# Patient Record
Sex: MALE | Race: BLACK OR AFRICAN AMERICAN | NOT HISPANIC OR LATINO | Employment: UNEMPLOYED | URBAN - METROPOLITAN AREA
[De-identification: names, ages, dates, MRNs, and addresses within clinical notes are randomized per-mention and may not be internally consistent; named-entity substitution may affect disease eponyms.]

---

## 2023-02-10 PROBLEM — E78.5 DYSLIPIDEMIA: Status: ACTIVE | Noted: 2023-02-10

## 2023-02-10 PROBLEM — R35.0 URINARY FREQUENCY: Status: ACTIVE | Noted: 2023-02-10

## 2023-02-10 PROBLEM — G47.34 NOCTURNAL HYPOXIA: Status: ACTIVE | Noted: 2023-02-10

## 2023-02-10 PROBLEM — M25.562 LEFT KNEE PAIN: Status: ACTIVE | Noted: 2023-02-10

## 2023-02-10 PROBLEM — F11.20 METHADONE DEPENDENCE (MULTI): Status: ACTIVE | Noted: 2023-02-10

## 2023-02-10 PROBLEM — J44.9 CHRONIC OBSTRUCTIVE PULMONARY DISEASE (MULTI): Status: ACTIVE | Noted: 2023-02-10

## 2023-02-10 PROBLEM — R51.9 HEADACHE: Status: ACTIVE | Noted: 2023-02-10

## 2023-02-10 PROBLEM — R93.89 ABNORMAL CHEST XRAY: Status: ACTIVE | Noted: 2023-02-10

## 2023-02-10 PROBLEM — G47.33 OSA ON CPAP: Status: ACTIVE | Noted: 2023-02-10

## 2023-02-10 PROBLEM — R07.9 CHEST PAIN: Status: ACTIVE | Noted: 2023-02-10

## 2023-02-10 PROBLEM — U07.1 COVID-19 VIRUS INFECTION: Status: ACTIVE | Noted: 2023-02-10

## 2023-02-10 PROBLEM — E78.5 HYPERLIPIDEMIA: Status: ACTIVE | Noted: 2023-02-10

## 2023-02-10 PROBLEM — K86.2 PANCREATIC CYST (HHS-HCC): Status: ACTIVE | Noted: 2023-02-10

## 2023-02-10 PROBLEM — R10.9 ABDOMINAL PAIN: Status: ACTIVE | Noted: 2023-02-10

## 2023-02-10 PROBLEM — I50.9 CONGESTIVE HEART FAILURE (MULTI): Status: ACTIVE | Noted: 2023-02-10

## 2023-02-10 PROBLEM — M79.5 RETAINED BULLET: Status: ACTIVE | Noted: 2023-02-10

## 2023-02-10 PROBLEM — R94.30 ABNORMAL RESULT OF CARDIOVASCULAR FUNCTION STUDY, UNSPECIFIED: Status: ACTIVE | Noted: 2023-02-10

## 2023-02-10 PROBLEM — K51.40 PSEUDOPOLYPOSIS OF COLON (MULTI): Status: ACTIVE | Noted: 2023-02-10

## 2023-02-10 PROBLEM — K52.9 CHRONIC COLITIS: Status: ACTIVE | Noted: 2023-02-10

## 2023-02-10 PROBLEM — R09.02 HYPOXEMIA: Status: ACTIVE | Noted: 2023-02-10

## 2023-02-10 PROBLEM — H61.21 IMPACTED CERUMEN OF RIGHT EAR: Status: ACTIVE | Noted: 2023-02-10

## 2023-02-10 PROBLEM — R14.0 BLOATING: Status: ACTIVE | Noted: 2023-02-10

## 2023-02-10 PROBLEM — J45.909 ASTHMA (HHS-HCC): Status: ACTIVE | Noted: 2023-02-10

## 2023-02-10 PROBLEM — I50.22 CHRONIC SYSTOLIC CONGESTIVE HEART FAILURE (MULTI): Status: ACTIVE | Noted: 2023-02-10

## 2023-02-10 PROBLEM — K86.9 PANCREATIC LESION (HHS-HCC): Status: ACTIVE | Noted: 2023-02-10

## 2023-02-10 PROBLEM — J18.9 PNEUMONIA: Status: ACTIVE | Noted: 2023-02-10

## 2023-02-10 PROBLEM — R12 HEARTBURN: Status: ACTIVE | Noted: 2023-02-10

## 2023-02-10 PROBLEM — R05.3 PERSISTENT COUGH: Status: ACTIVE | Noted: 2023-02-10

## 2023-02-10 PROBLEM — D12.2 ADENOMATOUS POLYP OF ASCENDING COLON: Status: ACTIVE | Noted: 2023-02-10

## 2023-02-10 PROBLEM — J20.9 ACUTE BRONCHITIS, UNSPECIFIED: Status: ACTIVE | Noted: 2023-02-10

## 2023-02-10 PROBLEM — R91.1 LUNG NODULE: Status: ACTIVE | Noted: 2023-02-10

## 2023-02-10 PROBLEM — S02.5XXA BROKEN TEETH: Status: ACTIVE | Noted: 2023-02-10

## 2023-02-10 PROBLEM — I51.9 LEFT VENTRICULAR SYSTOLIC DYSFUNCTION, CHRONIC: Status: ACTIVE | Noted: 2023-02-10

## 2023-02-10 PROBLEM — M25.561 RIGHT KNEE PAIN: Status: ACTIVE | Noted: 2023-02-10

## 2023-02-10 PROBLEM — R73.9 HYPERGLYCEMIA: Status: ACTIVE | Noted: 2023-02-10

## 2023-02-10 PROBLEM — R79.89 ELEVATED TSH: Status: ACTIVE | Noted: 2023-02-10

## 2023-02-10 PROBLEM — N52.9 MALE ERECTILE DISORDER: Status: ACTIVE | Noted: 2023-02-10

## 2023-02-10 PROBLEM — R53.83 FATIGUE: Status: ACTIVE | Noted: 2023-02-10

## 2023-02-10 PROBLEM — I51.7 LEFT VENTRICULAR HYPERTROPHY: Status: ACTIVE | Noted: 2023-02-10

## 2023-02-10 PROBLEM — K50.90 CROHN'S DISEASE (MULTI): Status: ACTIVE | Noted: 2023-02-10

## 2023-02-10 PROBLEM — I11.9 HYPERTENSIVE HEART DISEASE: Status: ACTIVE | Noted: 2023-02-10

## 2023-02-10 PROBLEM — I10 ESSENTIAL HYPERTENSION: Status: ACTIVE | Noted: 2023-02-10

## 2023-02-10 PROBLEM — N52.9 INABILITY TO ATTAIN ERECTION: Status: ACTIVE | Noted: 2023-02-10

## 2023-02-10 RX ORDER — PRAVASTATIN SODIUM 40 MG/1
1 TABLET ORAL NIGHTLY
COMMUNITY
Start: 2017-03-13 | End: 2024-02-21 | Stop reason: SDUPTHER

## 2023-02-10 RX ORDER — METOPROLOL SUCCINATE 200 MG/1
1 TABLET, EXTENDED RELEASE ORAL DAILY
COMMUNITY
Start: 2020-11-10 | End: 2023-12-19

## 2023-02-10 RX ORDER — ALBUTEROL SULFATE 0.63 MG/3ML
0.63 SOLUTION RESPIRATORY (INHALATION) 4 TIMES DAILY PRN
COMMUNITY
Start: 2020-07-07

## 2023-02-10 RX ORDER — SPIRONOLACTONE 25 MG/1
25 TABLET ORAL DAILY
COMMUNITY
Start: 2021-02-05 | End: 2024-02-21 | Stop reason: SDUPTHER

## 2023-02-10 RX ORDER — HYDRALAZINE HYDROCHLORIDE 100 MG/1
1 TABLET, FILM COATED ORAL EVERY 8 HOURS
COMMUNITY
Start: 2016-01-26 | End: 2023-11-30 | Stop reason: DRUGHIGH

## 2023-02-10 RX ORDER — DAPAGLIFLOZIN 10 MG/1
1 TABLET, FILM COATED ORAL DAILY
COMMUNITY
Start: 2021-06-07 | End: 2024-02-21 | Stop reason: SDUPTHER

## 2023-02-10 RX ORDER — ASPIRIN 81 MG/1
1 TABLET ORAL DAILY
COMMUNITY
Start: 2018-09-11 | End: 2023-08-23 | Stop reason: SDUPTHER

## 2023-02-10 RX ORDER — ISOSORBIDE MONONITRATE 60 MG/1
1 TABLET, EXTENDED RELEASE ORAL DAILY
COMMUNITY
Start: 2016-07-05 | End: 2023-11-30 | Stop reason: ALTCHOICE

## 2023-02-10 RX ORDER — NITROGLYCERIN 0.4 MG/1
TABLET SUBLINGUAL
COMMUNITY

## 2023-03-06 LAB
ALBUMIN (G/DL) IN SER/PLAS: 4.1 G/DL (ref 3.4–5)
ANION GAP IN SER/PLAS: 12 MMOL/L (ref 10–20)
CALCIUM (MG/DL) IN SER/PLAS: 9.6 MG/DL (ref 8.6–10.6)
CARBON DIOXIDE, TOTAL (MMOL/L) IN SER/PLAS: 32 MMOL/L (ref 21–32)
CHLORIDE (MMOL/L) IN SER/PLAS: 104 MMOL/L (ref 98–107)
CREATININE (MG/DL) IN SER/PLAS: 1.17 MG/DL (ref 0.5–1.3)
GFR MALE: 71 ML/MIN/1.73M2
GLUCOSE (MG/DL) IN SER/PLAS: 87 MG/DL (ref 74–99)
PHOSPHATE (MG/DL) IN SER/PLAS: 4.2 MG/DL (ref 2.5–4.9)
POTASSIUM (MMOL/L) IN SER/PLAS: 4.8 MMOL/L (ref 3.5–5.3)
SODIUM (MMOL/L) IN SER/PLAS: 143 MMOL/L (ref 136–145)
UREA NITROGEN (MG/DL) IN SER/PLAS: 23 MG/DL (ref 6–23)

## 2023-03-07 ENCOUNTER — APPOINTMENT (OUTPATIENT)
Dept: PRIMARY CARE | Facility: CLINIC | Age: 63
End: 2023-03-07
Payer: MEDICAID

## 2023-03-16 DIAGNOSIS — K52.9 COLITIS: Primary | ICD-10-CM

## 2023-03-16 RX ORDER — ISOSORBIDE MONONITRATE 60 MG/1
60 TABLET, EXTENDED RELEASE ORAL
Status: CANCELLED | OUTPATIENT
Start: 2023-03-16

## 2023-03-16 RX ORDER — MESALAMINE 1.2 G/1
TABLET, DELAYED RELEASE ORAL
COMMUNITY
Start: 2021-10-05 | End: 2023-03-16 | Stop reason: SDUPTHER

## 2023-03-17 RX ORDER — MESALAMINE 1.2 G/1
1200 TABLET, DELAYED RELEASE ORAL 2 TIMES DAILY
Qty: 180 TABLET | Refills: 1 | Status: SHIPPED | OUTPATIENT
Start: 2023-03-17 | End: 2023-11-30 | Stop reason: SDUPTHER

## 2023-03-27 ENCOUNTER — APPOINTMENT (OUTPATIENT)
Dept: PRIMARY CARE | Facility: CLINIC | Age: 63
End: 2023-03-27
Payer: MEDICAID

## 2023-03-28 ENCOUNTER — OFFICE VISIT (OUTPATIENT)
Dept: PRIMARY CARE | Facility: CLINIC | Age: 63
End: 2023-03-28
Payer: MEDICAID

## 2023-03-28 VITALS — DIASTOLIC BLOOD PRESSURE: 100 MMHG | BODY MASS INDEX: 28.17 KG/M2 | WEIGHT: 202 LBS | SYSTOLIC BLOOD PRESSURE: 160 MMHG

## 2023-03-28 DIAGNOSIS — I10 ESSENTIAL HYPERTENSION: ICD-10-CM

## 2023-03-28 DIAGNOSIS — J45.909 MILD ASTHMA, UNSPECIFIED WHETHER COMPLICATED, UNSPECIFIED WHETHER PERSISTENT (HHS-HCC): Primary | ICD-10-CM

## 2023-03-28 DIAGNOSIS — K86.2 PANCREATIC CYST (HHS-HCC): ICD-10-CM

## 2023-03-28 DIAGNOSIS — K50.919 CROHN'S DISEASE WITH COMPLICATION, UNSPECIFIED GASTROINTESTINAL TRACT LOCATION (MULTI): ICD-10-CM

## 2023-03-28 DIAGNOSIS — J43.9 PULMONARY EMPHYSEMA, UNSPECIFIED EMPHYSEMA TYPE (MULTI): ICD-10-CM

## 2023-03-28 DIAGNOSIS — K52.9 CHRONIC COLITIS: ICD-10-CM

## 2023-03-28 DIAGNOSIS — K50.90 CROHN'S DISEASE WITHOUT COMPLICATION, UNSPECIFIED GASTROINTESTINAL TRACT LOCATION (MULTI): ICD-10-CM

## 2023-03-28 DIAGNOSIS — I50.22 CHRONIC SYSTOLIC CONGESTIVE HEART FAILURE (MULTI): ICD-10-CM

## 2023-03-28 DIAGNOSIS — J44.1 CHRONIC OBSTRUCTIVE PULMONARY DISEASE WITH (ACUTE) EXACERBATION (MULTI): ICD-10-CM

## 2023-03-28 PROCEDURE — 99214 OFFICE O/P EST MOD 30 MIN: CPT | Performed by: INTERNAL MEDICINE

## 2023-03-28 PROCEDURE — 1036F TOBACCO NON-USER: CPT | Performed by: INTERNAL MEDICINE

## 2023-03-28 PROCEDURE — 3077F SYST BP >= 140 MM HG: CPT | Performed by: INTERNAL MEDICINE

## 2023-03-28 PROCEDURE — 3080F DIAST BP >= 90 MM HG: CPT | Performed by: INTERNAL MEDICINE

## 2023-03-28 ASSESSMENT — ENCOUNTER SYMPTOMS
OCCASIONAL FEELINGS OF UNSTEADINESS: 0
LOSS OF SENSATION IN FEET: 0
DEPRESSION: 0

## 2023-03-28 NOTE — PROGRESS NOTES
Subjective   Patient ID: Arnaldo Francisco is a 62 y.o. male who presents for Follow-up.    HPI   62 years old male comes in to see me today for his multiple medical issues including COPD emphysema and asthma diabetes mellitus hypertension colitis with Crohn disease congestive heart failure and hyperlipidemia with obstructive sleep apnea.  He is wearing oxygen by nasal cannula at 3 L/min.  He has a form to be completed for the oxygen company.  Review of Systems  12 system reviewed all negative.  He is wearing oxygen at night and during the day when he is active or exercising.  Objective   BP (!) 160/100 (BP Location: Right arm, Patient Position: Sitting, BP Cuff Size: Adult)   Wt 91.6 kg (202 lb)   BMI 28.17 kg/m²     Physical Exam  Alert and oriented in no acute distress.  Nonicteric sclera or jaundice.  Pleasant and cooperative.  Neck supple no masses lymph no thyromegaly or jugular venous distention.  Lungs diminished but clear no wheezing or rales.  Heart normal S1 and S2 regular rhythm.  Abdomen benign nontender no masses no organomegaly.  No pain on palpation.  Neurological exam intact.  Assessment/Plan   Diagnoses and all orders for this visit:  Mild asthma, unspecified whether complicated, unspecified whether persistent  Crohn's disease with complication, unspecified gastrointestinal tract location (CMS/HCC)  Chronic obstructive pulmonary disease with (acute) exacerbation (CMS/HCC)  Pulmonary emphysema, unspecified emphysema type (CMS/HCC)  Chronic systolic congestive heart failure (CMS/HCC)  Essential hypertension  Chronic colitis  Pancreatic cyst  Crohn's disease without complication, unspecified gastrointestinal tract location (CMS/HCC)

## 2023-04-17 ENCOUNTER — APPOINTMENT (OUTPATIENT)
Dept: LAB | Facility: LAB | Age: 63
End: 2023-04-17
Payer: MEDICAID

## 2023-04-17 LAB
ALBUMIN (G/DL) IN SER/PLAS: 4 G/DL (ref 3.4–5)
ANION GAP IN SER/PLAS: 11 MMOL/L (ref 10–20)
CALCIUM (MG/DL) IN SER/PLAS: 9 MG/DL (ref 8.6–10.6)
CARBON DIOXIDE, TOTAL (MMOL/L) IN SER/PLAS: 30 MMOL/L (ref 21–32)
CHLORIDE (MMOL/L) IN SER/PLAS: 105 MMOL/L (ref 98–107)
CREATININE (MG/DL) IN SER/PLAS: 1.15 MG/DL (ref 0.5–1.3)
ERYTHROCYTE DISTRIBUTION WIDTH (RATIO) BY AUTOMATED COUNT: 14.9 % (ref 11.5–14.5)
ERYTHROCYTE MEAN CORPUSCULAR HEMOGLOBIN CONCENTRATION (G/DL) BY AUTOMATED: 30.4 G/DL (ref 32–36)
ERYTHROCYTE MEAN CORPUSCULAR VOLUME (FL) BY AUTOMATED COUNT: 93 FL (ref 80–100)
ERYTHROCYTES (10*6/UL) IN BLOOD BY AUTOMATED COUNT: 4.07 X10E12/L (ref 4.5–5.9)
FERRITIN (UG/LL) IN SER/PLAS: 92 UG/L (ref 20–300)
GFR MALE: 72 ML/MIN/1.73M2
GLUCOSE (MG/DL) IN SER/PLAS: 81 MG/DL (ref 74–99)
HEMATOCRIT (%) IN BLOOD BY AUTOMATED COUNT: 37.8 % (ref 41–52)
HEMOGLOBIN (G/DL) IN BLOOD: 11.5 G/DL (ref 13.5–17.5)
IRON (UG/DL) IN SER/PLAS: 39 UG/DL (ref 35–150)
IRON BINDING CAPACITY (UG/DL) IN SER/PLAS: 357 UG/DL (ref 240–445)
IRON SATURATION (%) IN SER/PLAS: 11 % (ref 25–45)
LEUKOCYTES (10*3/UL) IN BLOOD BY AUTOMATED COUNT: 6.1 X10E9/L (ref 4.4–11.3)
NATRIURETIC PEPTIDE B (PG/ML) IN SER/PLAS: 173 PG/ML (ref 0–99)
NRBC (PER 100 WBCS) BY AUTOMATED COUNT: 0 /100 WBC (ref 0–0)
PHOSPHATE (MG/DL) IN SER/PLAS: 3.4 MG/DL (ref 2.5–4.9)
PLATELETS (10*3/UL) IN BLOOD AUTOMATED COUNT: 278 X10E9/L (ref 150–450)
POTASSIUM (MMOL/L) IN SER/PLAS: 4 MMOL/L (ref 3.5–5.3)
SODIUM (MMOL/L) IN SER/PLAS: 142 MMOL/L (ref 136–145)
UREA NITROGEN (MG/DL) IN SER/PLAS: 17 MG/DL (ref 6–23)

## 2023-04-19 ENCOUNTER — TELEPHONE (OUTPATIENT)
Dept: PRIMARY CARE | Facility: CLINIC | Age: 63
End: 2023-04-19
Payer: MEDICAID

## 2023-04-19 NOTE — TELEPHONE ENCOUNTER
I called the patient and discussed the situation of his change in his CBC where his H&H dropped down because of his colitis, he is supposed to be on iron supplement.  His cardiologist likes to give him an iron infusion.  He will go back to the GI and to cardiology again and I will see him after that.  Foods rich in iron was discussed.

## 2023-05-02 ENCOUNTER — APPOINTMENT (OUTPATIENT)
Dept: LAB | Facility: LAB | Age: 63
End: 2023-05-02
Payer: MEDICAID

## 2023-05-02 LAB
C REACTIVE PROTEIN (MG/L) IN SER/PLAS: 0.23 MG/DL
ERYTHROCYTE DISTRIBUTION WIDTH (RATIO) BY AUTOMATED COUNT: 14.3 % (ref 11.5–14.5)
ERYTHROCYTE MEAN CORPUSCULAR HEMOGLOBIN CONCENTRATION (G/DL) BY AUTOMATED: 30.4 G/DL (ref 32–36)
ERYTHROCYTE MEAN CORPUSCULAR VOLUME (FL) BY AUTOMATED COUNT: 89 FL (ref 80–100)
ERYTHROCYTES (10*6/UL) IN BLOOD BY AUTOMATED COUNT: 5.01 X10E12/L (ref 4.5–5.9)
HEMATOCRIT (%) IN BLOOD BY AUTOMATED COUNT: 44.8 % (ref 41–52)
HEMOGLOBIN (G/DL) IN BLOOD: 13.6 G/DL (ref 13.5–17.5)
LEUKOCYTES (10*3/UL) IN BLOOD BY AUTOMATED COUNT: 3.9 X10E9/L (ref 4.4–11.3)
NRBC (PER 100 WBCS) BY AUTOMATED COUNT: 0 /100 WBC (ref 0–0)
PLATELETS (10*3/UL) IN BLOOD AUTOMATED COUNT: 223 X10E9/L (ref 150–450)

## 2023-05-05 LAB — CALPROTECTIN, STOOL: 111 UG/G

## 2023-05-26 ENCOUNTER — OFFICE VISIT (OUTPATIENT)
Dept: PRIMARY CARE | Facility: CLINIC | Age: 63
End: 2023-05-26
Payer: MEDICAID

## 2023-05-26 VITALS — SYSTOLIC BLOOD PRESSURE: 154 MMHG | WEIGHT: 190 LBS | BODY MASS INDEX: 25.77 KG/M2 | DIASTOLIC BLOOD PRESSURE: 84 MMHG

## 2023-05-26 DIAGNOSIS — M25.562 ACUTE PAIN OF LEFT KNEE: Primary | ICD-10-CM

## 2023-05-26 PROCEDURE — 3079F DIAST BP 80-89 MM HG: CPT | Performed by: INTERNAL MEDICINE

## 2023-05-26 PROCEDURE — 99212 OFFICE O/P EST SF 10 MIN: CPT | Performed by: INTERNAL MEDICINE

## 2023-05-26 PROCEDURE — 1036F TOBACCO NON-USER: CPT | Performed by: INTERNAL MEDICINE

## 2023-05-26 PROCEDURE — 3077F SYST BP >= 140 MM HG: CPT | Performed by: INTERNAL MEDICINE

## 2023-05-26 NOTE — PROGRESS NOTES
Subjective   Patient ID: Arnaldo Francisco is a 62 y.o. male who presents for Hypertension and Knee Pain (Wants cortisone shot).    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  He is here because his left knee is giving him so much pain and he is in severe pain according to him and to his wife.  He is here for a cortisone shot.  Everything else was and is okay.  Review of Systems  12 system reviewed and all negative he wants a shot of cortisone in his left knee.  Objective   /84   Wt 86.2 kg (190 lb)   BMI 25.77 kg/m²     Physical Exam  Alert oriented in no distress.  Limping when he walks.  Nonicteric sclera or jaundice.  Face symmetrical cranial nerves intact.  Neck supple no masses no lymph no thyromegaly or jugular venous distention.  Lungs clear.  Heart normal S1 and S2 regular rhythm.  Abdomen benign neurologically intact.  Left knee exam reveals severe pain on the medial aspect of the knee.  We were able to fit him with the orthopedist at 1:00 today to give him an injection right away.  Assessment/Plan   Diagnoses and all orders for this visit:  Acute pain of left knee

## 2023-07-14 ENCOUNTER — HOSPITAL ENCOUNTER (OUTPATIENT)
Dept: DATA CONVERSION | Facility: HOSPITAL | Age: 63
End: 2023-07-14
Attending: STUDENT IN AN ORGANIZED HEALTH CARE EDUCATION/TRAINING PROGRAM | Admitting: STUDENT IN AN ORGANIZED HEALTH CARE EDUCATION/TRAINING PROGRAM
Payer: MEDICAID

## 2023-07-14 DIAGNOSIS — K31.89 OTHER DISEASES OF STOMACH AND DUODENUM: ICD-10-CM

## 2023-07-14 DIAGNOSIS — K92.1 MELENA: ICD-10-CM

## 2023-07-14 DIAGNOSIS — K50.90 CROHN'S DISEASE, UNSPECIFIED, WITHOUT COMPLICATIONS (MULTI): ICD-10-CM

## 2023-07-14 DIAGNOSIS — J45.909 UNSPECIFIED ASTHMA, UNCOMPLICATED (HHS-HCC): ICD-10-CM

## 2023-07-14 DIAGNOSIS — K50.10 CROHN'S DISEASE OF LARGE INTESTINE WITHOUT COMPLICATIONS (MULTI): ICD-10-CM

## 2023-07-14 DIAGNOSIS — K52.9 NONINFECTIVE GASTROENTERITIS AND COLITIS, UNSPECIFIED: ICD-10-CM

## 2023-07-14 DIAGNOSIS — K29.50 UNSPECIFIED CHRONIC GASTRITIS WITHOUT BLEEDING: ICD-10-CM

## 2023-07-14 DIAGNOSIS — K21.9 GASTRO-ESOPHAGEAL REFLUX DISEASE WITHOUT ESOPHAGITIS: ICD-10-CM

## 2023-07-14 DIAGNOSIS — K22.89 OTHER SPECIFIED DISEASE OF ESOPHAGUS: ICD-10-CM

## 2023-07-14 DIAGNOSIS — I10 ESSENTIAL (PRIMARY) HYPERTENSION: ICD-10-CM

## 2023-07-14 DIAGNOSIS — B96.81 HELICOBACTER PYLORI (H. PYLORI) AS THE CAUSE OF DISEASES CLASSIFIED ELSEWHERE: ICD-10-CM

## 2023-07-26 LAB
COMPLETE PATHOLOGY REPORT: NORMAL
CONVERTED ADDENDUM DIAGNOSIS 2: NORMAL
CONVERTED CLINICAL DIAGNOSIS-HISTORY: NORMAL
CONVERTED FINAL DIAGNOSIS: NORMAL
CONVERTED FINAL REPORT PDF LINK TO COPY AND PASTE: NORMAL
CONVERTED GROSS DESCRIPTION: NORMAL

## 2023-08-08 ENCOUNTER — APPOINTMENT (OUTPATIENT)
Dept: LAB | Facility: LAB | Age: 63
End: 2023-08-08
Payer: MEDICAID

## 2023-08-08 LAB
ALANINE AMINOTRANSFERASE (SGPT) (U/L) IN SER/PLAS: 14 U/L (ref 10–52)
ALBUMIN (G/DL) IN SER/PLAS: 4.1 G/DL (ref 3.4–5)
ALBUMIN (G/DL) IN SER/PLAS: 4.1 G/DL (ref 3.4–5)
ALKALINE PHOSPHATASE (U/L) IN SER/PLAS: 57 U/L (ref 33–136)
ANION GAP IN SER/PLAS: 10 MMOL/L (ref 10–20)
ASPARTATE AMINOTRANSFERASE (SGOT) (U/L) IN SER/PLAS: 16 U/L (ref 9–39)
BILIRUBIN DIRECT (MG/DL) IN SER/PLAS: 0.2 MG/DL (ref 0–0.3)
BILIRUBIN TOTAL (MG/DL) IN SER/PLAS: 1 MG/DL (ref 0–1.2)
CALCIUM (MG/DL) IN SER/PLAS: 9.2 MG/DL (ref 8.6–10.6)
CARBON DIOXIDE, TOTAL (MMOL/L) IN SER/PLAS: 30 MMOL/L (ref 21–32)
CHLORIDE (MMOL/L) IN SER/PLAS: 102 MMOL/L (ref 98–107)
CREATININE (MG/DL) IN SER/PLAS: 1.03 MG/DL (ref 0.5–1.3)
ERYTHROCYTE DISTRIBUTION WIDTH (RATIO) BY AUTOMATED COUNT: 15.7 % (ref 11.5–14.5)
ERYTHROCYTE MEAN CORPUSCULAR HEMOGLOBIN CONCENTRATION (G/DL) BY AUTOMATED: 30.3 G/DL (ref 32–36)
ERYTHROCYTE MEAN CORPUSCULAR VOLUME (FL) BY AUTOMATED COUNT: 90 FL (ref 80–100)
ERYTHROCYTES (10*6/UL) IN BLOOD BY AUTOMATED COUNT: 5.43 X10E12/L (ref 4.5–5.9)
FERRITIN (UG/LL) IN SER/PLAS: 79 UG/L (ref 20–300)
GFR MALE: 82 ML/MIN/1.73M2
GLUCOSE (MG/DL) IN SER/PLAS: 87 MG/DL (ref 74–99)
HEMATOCRIT (%) IN BLOOD BY AUTOMATED COUNT: 49.1 % (ref 41–52)
HEMOGLOBIN (G/DL) IN BLOOD: 14.9 G/DL (ref 13.5–17.5)
IRON (UG/DL) IN SER/PLAS: 85 UG/DL (ref 35–150)
IRON BINDING CAPACITY (UG/DL) IN SER/PLAS: 400 UG/DL (ref 240–445)
IRON SATURATION (%) IN SER/PLAS: 21 % (ref 25–45)
LEUKOCYTES (10*3/UL) IN BLOOD BY AUTOMATED COUNT: 4.4 X10E9/L (ref 4.4–11.3)
NRBC (PER 100 WBCS) BY AUTOMATED COUNT: 0 /100 WBC (ref 0–0)
PHOSPHATE (MG/DL) IN SER/PLAS: 3.5 MG/DL (ref 2.5–4.9)
PLATELETS (10*3/UL) IN BLOOD AUTOMATED COUNT: 230 X10E9/L (ref 150–450)
POTASSIUM (MMOL/L) IN SER/PLAS: 4.2 MMOL/L (ref 3.5–5.3)
PROTEIN TOTAL: 6.6 G/DL (ref 6.4–8.2)
SODIUM (MMOL/L) IN SER/PLAS: 138 MMOL/L (ref 136–145)
UREA NITROGEN (MG/DL) IN SER/PLAS: 16 MG/DL (ref 6–23)

## 2023-08-21 ENCOUNTER — TELEPHONE (OUTPATIENT)
Dept: PRIMARY CARE | Facility: CLINIC | Age: 63
End: 2023-08-21

## 2023-08-21 ENCOUNTER — APPOINTMENT (OUTPATIENT)
Dept: PRIMARY CARE | Facility: CLINIC | Age: 63
End: 2023-08-21
Payer: MEDICAID

## 2023-08-28 ENCOUNTER — OFFICE VISIT (OUTPATIENT)
Dept: PRIMARY CARE | Facility: CLINIC | Age: 63
End: 2023-08-28
Payer: MEDICAID

## 2023-08-28 VITALS
TEMPERATURE: 97.1 F | SYSTOLIC BLOOD PRESSURE: 140 MMHG | DIASTOLIC BLOOD PRESSURE: 78 MMHG | WEIGHT: 184 LBS | BODY MASS INDEX: 24.95 KG/M2

## 2023-08-28 DIAGNOSIS — K52.9 COLITIS: ICD-10-CM

## 2023-08-28 DIAGNOSIS — I10 ESSENTIAL HYPERTENSION: ICD-10-CM

## 2023-08-28 DIAGNOSIS — J45.909 MILD ASTHMA, UNSPECIFIED WHETHER COMPLICATED, UNSPECIFIED WHETHER PERSISTENT (HHS-HCC): ICD-10-CM

## 2023-08-28 DIAGNOSIS — K50.90 CROHN'S DISEASE WITHOUT COMPLICATION, UNSPECIFIED GASTROINTESTINAL TRACT LOCATION (MULTI): ICD-10-CM

## 2023-08-28 DIAGNOSIS — J44.1 CHRONIC OBSTRUCTIVE PULMONARY DISEASE WITH (ACUTE) EXACERBATION (MULTI): Primary | ICD-10-CM

## 2023-08-28 DIAGNOSIS — I50.22 CHRONIC SYSTOLIC CONGESTIVE HEART FAILURE (MULTI): ICD-10-CM

## 2023-08-28 LAB
HEPATITIS B VIRUS SURFACE AB (MIU/ML) IN SERUM: <3.1 MIU/ML
HIV 1/ 2 AG/AB SCREEN: NONREACTIVE
SYPHILIS TOTAL AB: NONREACTIVE

## 2023-08-28 PROCEDURE — 87591 N.GONORRHOEAE DNA AMP PROB: CPT

## 2023-08-28 PROCEDURE — 86780 TREPONEMA PALLIDUM: CPT

## 2023-08-28 PROCEDURE — 87389 HIV-1 AG W/HIV-1&-2 AB AG IA: CPT

## 2023-08-28 PROCEDURE — 3078F DIAST BP <80 MM HG: CPT | Performed by: INTERNAL MEDICINE

## 2023-08-28 PROCEDURE — 99214 OFFICE O/P EST MOD 30 MIN: CPT | Performed by: INTERNAL MEDICINE

## 2023-08-28 PROCEDURE — 3077F SYST BP >= 140 MM HG: CPT | Performed by: INTERNAL MEDICINE

## 2023-08-28 PROCEDURE — 86706 HEP B SURFACE ANTIBODY: CPT

## 2023-08-28 PROCEDURE — 87491 CHLMYD TRACH DNA AMP PROBE: CPT

## 2023-08-28 PROCEDURE — 87661 TRICHOMONAS VAGINALIS AMPLIF: CPT

## 2023-08-28 PROCEDURE — 1036F TOBACCO NON-USER: CPT | Performed by: INTERNAL MEDICINE

## 2023-08-28 RX ORDER — MESALAMINE 1.2 G/1
TABLET, DELAYED RELEASE ORAL
Qty: 120 TABLET | Refills: 11 | Status: SHIPPED | OUTPATIENT
Start: 2023-08-28 | End: 2023-12-05

## 2023-08-28 ASSESSMENT — ENCOUNTER SYMPTOMS
LOSS OF SENSATION IN FEET: 1
OCCASIONAL FEELINGS OF UNSTEADINESS: 0
DEPRESSION: 0

## 2023-08-28 ASSESSMENT — PAIN SCALES - GENERAL: PAINLEVEL: 0-NO PAIN

## 2023-08-28 NOTE — PROGRESS NOTES
Subjective   Patient ID: Arnaldo Francisco is a 63 y.o. male who presents for Hypertension, Hypothyroidism, and Hyperlipidemia.    HPI   63 years old male comes in to see me today after he consulted with GI and had colonoscopy done for black stools.  He was treated with mesalamine increased dose 1.2 g 2 tablets twice a day and also was treated with bismuth or Pylera 3 capsule by mouth 4 times a day for 2 weeks.  He has no more GI symptoms at this stage.  He messed up with his ex-girlfriend even though he is  and now he is worried about STD.  He would like to be checked.  Review of Systems  12 system reviewed 12 systems are negative.  Not clear on his medication  He comes in to bring all his medication with him.  We will refill his mesalamine  Objective   /78 (BP Location: Left arm, Patient Position: Sitting, BP Cuff Size: Adult)   Temp 36.2 °C (97.1 °F) (Temporal)   Wt 83.5 kg (184 lb)   BMI 24.95 kg/m²     Physical Exam  Alert oriented in no distress pleasant cooperative.  Nonicteric sclera or jaundice.  Face symmetrical cranial nerves intact.  Neck supple no masses no lymph node thyromegaly or jugular venous distention.  Lungs clear diminished but no rales or wheezing.  Heart normal S1 and S2 regular rhythm.  Abdomen benign nontender no masses no organomegaly.  Neurological exam intact.  STD checked the swab done he will call us with his medication list to reconcile it with what we have here.  Come back in 3 months call you with your results at [phone]  Assessment/Plan   Diagnoses and all orders for this visit:  Chronic obstructive pulmonary disease with (acute) exacerbation (CMS/HCC)  Colitis  STD (female)  Essential hypertension  Crohn's disease without complication, unspecified gastrointestinal tract location (CMS/HCC)  Mild asthma, unspecified whether complicated, unspecified whether persistent  Chronic systolic congestive heart failure (CMS/HCC)

## 2023-08-29 LAB
CHLAMYDIA TRACH., AMPLIFIED: NEGATIVE
N. GONORRHEA, AMPLIFIED: NEGATIVE

## 2023-08-30 NOTE — TELEPHONE ENCOUNTER
Called the patient to inform her STD results.  All of them came back negative.  He still feels something questionable some irritation or something going on he said.  I offered him to see urology.  He will let me know.  Drink a lot of liquid water he was very thankful for the call

## 2023-08-31 LAB — TRICHOMONAS VAGINALIS: NEGATIVE

## 2023-09-29 VITALS — BODY MASS INDEX: 26.04 KG/M2 | WEIGHT: 192.24 LBS | HEIGHT: 72 IN

## 2023-10-02 ENCOUNTER — TELEPHONE (OUTPATIENT)
Dept: PRIMARY CARE | Facility: CLINIC | Age: 63
End: 2023-10-02

## 2023-10-03 ENCOUNTER — DOCUMENTATION (OUTPATIENT)
Dept: GASTROENTEROLOGY | Facility: HOSPITAL | Age: 63
End: 2023-10-03
Payer: MEDICAID

## 2023-10-03 DIAGNOSIS — B96.81 GASTRITIS, HELICOBACTER PYLORI: ICD-10-CM

## 2023-10-03 DIAGNOSIS — K29.70 GASTRITIS, HELICOBACTER PYLORI: Primary | ICD-10-CM

## 2023-10-03 DIAGNOSIS — B96.81 HELICOBACTER PYLORI GASTRITIS: Primary | ICD-10-CM

## 2023-10-03 DIAGNOSIS — B96.81 GASTRITIS, HELICOBACTER PYLORI: Primary | ICD-10-CM

## 2023-10-03 DIAGNOSIS — K29.70 GASTRITIS, HELICOBACTER PYLORI: ICD-10-CM

## 2023-10-03 DIAGNOSIS — K29.70 HELICOBACTER PYLORI GASTRITIS: Primary | ICD-10-CM

## 2023-10-03 RX ORDER — RIFABUTIN 150 MG/1
300 CAPSULE ORAL DAILY
Qty: 28 CAPSULE | Refills: 0 | Status: SHIPPED | OUTPATIENT
Start: 2023-10-03 | End: 2023-10-17

## 2023-10-03 RX ORDER — PANTOPRAZOLE SODIUM 40 MG/1
40 TABLET, DELAYED RELEASE ORAL 2 TIMES DAILY
Qty: 60 TABLET | Refills: 0 | Status: SHIPPED | OUTPATIENT
Start: 2023-10-03 | End: 2023-12-22 | Stop reason: SDUPTHER

## 2023-10-03 RX ORDER — AMOXICILLIN 250 MG/1
750 CAPSULE ORAL EVERY 8 HOURS SCHEDULED
Qty: 126 CAPSULE | Refills: 0 | Status: SHIPPED | OUTPATIENT
Start: 2023-10-03 | End: 2023-10-17

## 2023-10-03 NOTE — PROGRESS NOTES
Pt was called. UBT in Sept 2023 was positive, recall that EGD in July 2023 was c/w HP GASTRITIS s/p bismuth quadruple therapy.    Salvage therapy with rifabutin, amoxicillin and PPI has been ordered. Pt confirmed that he has no allergies to these meds and will let us know if he has any adverse events- rash, LH, dizziness, palpiations etc.    I told him that rifabutin may decrease levels and efficacy of amlodipine for HTN and he should check daily BP and if BP is high, he should stop medication and call us.

## 2023-10-05 ENCOUNTER — ANCILLARY ORDERS (OUTPATIENT)
Dept: RESPIRATORY THERAPY | Facility: HOSPITAL | Age: 63
End: 2023-10-05
Payer: MEDICAID

## 2023-10-05 DIAGNOSIS — J42 CHRONIC BRONCHITIS, UNSPECIFIED CHRONIC BRONCHITIS TYPE (MULTI): Primary | ICD-10-CM

## 2023-10-09 ENCOUNTER — HOSPITAL ENCOUNTER (OUTPATIENT)
Dept: RESPIRATORY THERAPY | Facility: HOSPITAL | Age: 63
Discharge: HOME | End: 2023-10-09
Payer: MEDICAID

## 2023-10-09 ENCOUNTER — OFFICE VISIT (OUTPATIENT)
Dept: PULMONOLOGY | Facility: HOSPITAL | Age: 63
End: 2023-10-09
Payer: MEDICAID

## 2023-10-09 VITALS
SYSTOLIC BLOOD PRESSURE: 152 MMHG | DIASTOLIC BLOOD PRESSURE: 97 MMHG | HEART RATE: 63 BPM | TEMPERATURE: 97.5 F | WEIGHT: 192 LBS | BODY MASS INDEX: 26.06 KG/M2 | OXYGEN SATURATION: 93 %

## 2023-10-09 DIAGNOSIS — G47.33 OSA ON CPAP: ICD-10-CM

## 2023-10-09 DIAGNOSIS — R09.02 HYPOXEMIA: ICD-10-CM

## 2023-10-09 DIAGNOSIS — J43.9 PULMONARY EMPHYSEMA, UNSPECIFIED EMPHYSEMA TYPE (MULTI): Primary | ICD-10-CM

## 2023-10-09 DIAGNOSIS — J42 CHRONIC BRONCHITIS, UNSPECIFIED CHRONIC BRONCHITIS TYPE (MULTI): ICD-10-CM

## 2023-10-09 PROCEDURE — 94726 PLETHYSMOGRAPHY LUNG VOLUMES: CPT

## 2023-10-09 PROCEDURE — 94060 EVALUATION OF WHEEZING: CPT | Performed by: NURSE PRACTITIONER

## 2023-10-09 PROCEDURE — 99214 OFFICE O/P EST MOD 30 MIN: CPT | Mod: GC | Performed by: INTERNAL MEDICINE

## 2023-10-09 PROCEDURE — 94060 EVALUATION OF WHEEZING: CPT

## 2023-10-09 PROCEDURE — 99214 OFFICE O/P EST MOD 30 MIN: CPT | Performed by: INTERNAL MEDICINE

## 2023-10-09 PROCEDURE — 94618 PULMONARY STRESS TESTING: CPT

## 2023-10-09 PROCEDURE — 3077F SYST BP >= 140 MM HG: CPT | Performed by: INTERNAL MEDICINE

## 2023-10-09 PROCEDURE — 3080F DIAST BP >= 90 MM HG: CPT | Performed by: INTERNAL MEDICINE

## 2023-10-09 PROCEDURE — 94729 DIFFUSING CAPACITY: CPT

## 2023-10-09 RX ORDER — FLUTICASONE PROPIONATE AND SALMETEROL 250; 50 UG/1; UG/1
1 POWDER RESPIRATORY (INHALATION) 2 TIMES DAILY
Qty: 40 EACH | Refills: 2 | Status: SHIPPED | OUTPATIENT
Start: 2023-10-09 | End: 2024-01-18

## 2023-10-09 SDOH — ECONOMIC STABILITY: FOOD INSECURITY: WITHIN THE PAST 12 MONTHS, YOU WORRIED THAT YOUR FOOD WOULD RUN OUT BEFORE YOU GOT MONEY TO BUY MORE.: NEVER TRUE

## 2023-10-09 SDOH — ECONOMIC STABILITY: FOOD INSECURITY: WITHIN THE PAST 12 MONTHS, THE FOOD YOU BOUGHT JUST DIDN'T LAST AND YOU DIDN'T HAVE MONEY TO GET MORE.: NEVER TRUE

## 2023-10-09 ASSESSMENT — LIFESTYLE VARIABLES
AUDIT-C TOTAL SCORE: 3
HOW OFTEN DO YOU HAVE A DRINK CONTAINING ALCOHOL: 2-3 TIMES A WEEK
HOW OFTEN DO YOU HAVE SIX OR MORE DRINKS ON ONE OCCASION: NEVER
HOW MANY STANDARD DRINKS CONTAINING ALCOHOL DO YOU HAVE ON A TYPICAL DAY: 1 OR 2
SKIP TO QUESTIONS 9-10: 1

## 2023-10-09 ASSESSMENT — PATIENT HEALTH QUESTIONNAIRE - PHQ9
2. FEELING DOWN, DEPRESSED OR HOPELESS: NOT AT ALL
SUM OF ALL RESPONSES TO PHQ9 QUESTIONS 1 AND 2: 0
1. LITTLE INTEREST OR PLEASURE IN DOING THINGS: NOT AT ALL

## 2023-10-09 ASSESSMENT — PAIN SCALES - GENERAL: PAINLEVEL: 0-NO PAIN

## 2023-10-09 NOTE — PROGRESS NOTES
Department of Medicine I Division of Pulmonary, Critical Care, and Sleep Medicine   [id number] [place] [address]  Phone: [phone]  Fax: [phone]    History of Present Illness   Arnaldo Francisco is a 63 y.o. 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    Patient symptoms include dyspnea on exertion. mMRC grade 2-3. Uses oxygen at night and with exertion. Denies cough, fevers, weight loss or night sweats. No recent exacerbations. Has been vaccinated for COVID-pneumonia, flu and pneumococcal pneumonia. In the past patient states that he used Trelegy inhaler, which improved his symptoms, however stopped using it due to headache. Currently uses only albuterol as needed. He has been enrolled in a pulmonary rehab session, not completed. Has not been hospitalized for COPD exacerbation in the last 1 year. CAT score 14.    PMH: As above  FH: No family history of lung disease  SH: Retired now, worked as a /watters. Smoked from age [age], 1 pack a day. Quit in 2001. Drinks beer/a glass of wine twice a week.     Review of Systems  All other review of systems are negative and/or non-contributory.    Past Medical History   He has a past medical history of Gastro-esophageal reflux disease without esophagitis (06/07/2021), Opioid use, unspecified, uncomplicated (11/24/2015), Personal history of other diseases of the digestive system (09/21/2020), Personal history of other diseases of the digestive system (06/15/2020), and Personal history of transient ischemic attack (TIA), and cerebral infarction without residual deficits.    Immunizations     Immunization History   Administered Date(s) Administered     Flu vaccine (IIV4), preservative free *Check age/dose* 11/25/2014, 10/10/2017, 09/16/2021, 11/04/2022    Influenza, injectable, MDCK, preservative free, quadrivalent 10/21/2020    Moderna SARS-CoV-2 Vaccination 03/18/2021    Pfizer COVID-19 vaccine, bivalent, age 5y-11y (10 mcg/0.2 mL) 11/04/2022    Pneumococcal polysaccharide vaccine, 23-valent, age 2 years and older (PNEUMOVAX 23) 11/25/2014, 11/04/2022    Pneumococcal, Unspecified 11/25/2014    SARS-CoV-2, Unspecified 08/03/2022    Td (adult), unspecified 08/13/2014    Tdap vaccine, age 7 year and older (BOOSTRIX) 02/12/2016       Medications and Allergies     Current Outpatient Medications   Medication Instructions    acetaminophen (TYLENOL) 500 mg, oral, Every 8 hours PRN    albuterol (Ventolin HFA) 90 mcg/actuation inhaler inhalation    albuterol 90 mcg/actuation inhaler 2 puffs, inhalation, Every 4 hours PRN    albuterol 0.63 mg, nebulization, 4 times daily PRN    amLODIPine (NORVASC) 5 mg, oral, Daily RT    amoxicillin (AMOXIL) 750 mg, oral, Every 8 hours scheduled    Anoro Ellipta 62.5-25 mcg/actuation blister with device inhalation    aspirin 81 mg, oral, Daily RT    budesonide-formoteroL (Symbicort) 160-4.5 mcg/actuation inhaler inhalation    carvedilol (COREG) 6.25 mg, oral    cetirizine (ZyrTEC) 10 mg tablet oral, Daily RT    cyclobenzaprine (Flexeril) 10 mg tablet oral, Every 8 hours PRN    dapagliflozin (Farxiga) 10 mg 1 tablet, oral, Daily    diclofenac sodium 4 g, Topical, Every 6 hours PRN    diphenhydrAMINE (BENADryl) 25 mg capsule oral    doxycycline (Monodox) 100 mg capsule oral    Entresto 24-26 mg tablet 1 tablet, oral, 2 times daily    Entresto 49-51 mg tablet 1 tablet, oral, 2 times daily    Entresto  mg tablet 1 tablet, oral, 2 times daily    ferric carboxymaltose (Injectafer) 50 mg iron/mL injection intravenous    ferrous sulfate 325 (65 Fe) MG tablet 1 tablet, oral, Daily    fluticasone (Flovent HFA) 110 mcg/actuation inhaler  inhalation, 2 times daily    fluticasone propion-salmeteroL (Advair Diskus) 250-50 mcg/dose diskus inhaler 1 puff, inhalation, 2 times daily, Rinse mouth with water after use to reduce aftertaste and incidence of candidiasis. Do not swallow.    fluticasone-umeclidin-vilanter (TRELEGY-ELLIPTA) 100-62.5-25 mcg blister with device inhalation    folic acid (Folvite) 1 mg tablet oral    gabapentin (Neurontin) 600 mg tablet 1 tablet, oral, 3 times daily    hydrALAZINE (Apresoline) 100 mg tablet 1 tablet, oral, Every 8 hours    hydrocortisone acetate 1 % cream Topical, 2 times daily    isosorbide mononitrate ER (Imdur) 60 mg 24 hr tablet 1 tablet, oral, Daily    levocetirizine (Xyzal) 5 mg tablet oral, Daily RT    lisinopril 40 mg, oral, Daily RT    lisinopril 20 mg, oral, Daily RT    mesalamine (Lialda) 1.2 gram EC tablet Do not crush, chew, or split. Take 2 tablets twice a day    mesalamine (LIALDA) 1.2 g, oral, 2 times daily    methadone (Dolophine) 10 mg tablet oral, Every 24 hours    methadone (Dolophine) 10 mg/5 mL solution 60 mL, Daily    metoprolol succinate XL (Toprol-XL) 200 mg 24 hr tablet 1 tablet, oral, Daily    nitroglycerin (Nitrostat) 0.4 mg SL tablet sublingual, Dissolve 1 tab under the tongue as needed for chest pain every 5 minutes up to 3 times if no relief call 911    pantoprazole (PROTONIX) 40 mg, oral, 2 times daily    pravastatin (Pravachol) 40 mg tablet 1 tablet, oral, Nightly    Pylera 140-125-125 mg capsule TAKE 3 CAPSULES BY MOUTH 4 TIMES A DAY FOR 2 WEEKS, OLD IRON FOR THESE 2 WEEKS    rifabutin (MYCOBUTIN) 300 mg, oral, Daily    simethicone (Mylicon) 80 mg chewable tablet oral    spironolactone (ALDACTONE) 25 mg, oral, Daily    tadalafil 20 mg tablet oral    tamsulosin (FLOMAX) 0.4 mg, oral, Daily RT    tiotropium (Spiriva Respimat) 2.5 mcg/actuation inhaler inhalation, Daily RT    tiotropium (Spiriva Respimat) 2.5 mcg/actuation inhaler 2 puffs, inhalation, Daily    torsemide (Demadex) 20 mg  tablet 1 tablet, oral, Daily    Voltaren Arthritis Pain 1 % gel gel       Ibuprofen and Shellfish containing products    Social History   He reports that he has quit smoking. His smoking use included cigarettes. He has a 20.00 pack-year smoking history. He has never been exposed to tobacco smoke. He has never used smokeless tobacco. He reports that he does not currently use alcohol. He reports that he does not currently use drugs after having used the following drugs: Heroin.    Smoking History: 1 ppd x 22 years, quit in 2001    Family History     Family History   Problem Relation Name Age of Onset    Hypertension Mother      Heart disease Mother      Heart disease Brother      Hypertension Sibling           Surgical History   He has a past surgical history that includes Other surgical history (06/07/2021).    Physical Exam   BP (!) 152/97   Pulse 63   Temp 36.4 °C (97.5 °F)   Wt 87.1 kg (192 lb)   SpO2 93% Comment: pt on 4L O2  BMI 26.06 kg/m²      Physical Exam  General: Awake and alert. In no acute distress.   Eyes: PERRL. Clear sclera.  ENT: Neck supple. Mucus membranes moist.  Neck: Trachea midline. No JVD.   Respiratory: Equal air entry bilaterally. No added sounds.  Cardiovascular: Regular rate and rhythm. S1S2 heard.  Gastrointestinal: Soft, non distended. Bowel sounds present.  Neurological: Awake and alert. No focal motor deficits.  Skin: Warm and dry. No rash.      Extremities: No pedal edema.  Results   Pulmonary Function Tests:      PFT today: FEV1 0.9, FVC 2.86, ratio 32%, +ve BD response (FEV1 1.32)  Normal TLC, air trapping, low DLCO    6MWT: 506m. Desat to 84%    Chest CT Scan:  No results found for this or any previous visit from the past 365 days.       ECHO:  Echocardiogram 8/4/2023    CONCLUSIONS:  1. Left ventricular systolic function is mildly decreased with a 40-45% estimated ejection fraction.  2. Spectral Doppler shows a pseudonormal pattern of left ventricular diastolic filling.  3.  "There is global hypokinesis of the left ventricle with minor regional variations.         Labs:  Lab Results   Component Value Date    WBC 4.4 08/08/2023    HGB 14.9 08/08/2023    HCT 49.1 08/08/2023    MCV 90 08/08/2023     08/08/2023       Lab Results   Component Value Date    CREATININE 1.03 08/08/2023    BUN 16 08/08/2023     08/08/2023    K 4.2 08/08/2023     08/08/2023    CO2 30 08/08/2023        Lab Results   Component Value Date    SEDRATE 1 07/11/2021        IgE: No results found for: \"IGE\"   Respiratory Allergy Panel:  AEC:   Eosinophils Absolute (x10E9/L)   Date Value   03/20/2022 0.22     Eosinophils % (%)   Date Value   03/20/2022 2.7          Assessment and Plan     Problem List Items Addressed This Visit             ICD-10-CM    Chronic obstructive pulmonary disease (CMS/HCC) - Primary J44.9    Relevant Medications    fluticasone propion-salmeteroL (Advair Diskus) 250-50 mcg/dose diskus inhaler    tiotropium (Spiriva Respimat) 2.5 mcg/actuation inhaler    Other Relevant Orders    Follow Up In Pulmonology    Home Oxygen Therapy    Oxygen therapy    Hypoxemia R09.02    Relevant Orders    Home Oxygen Therapy    Oxygen therapy    CHARLENE on CPAP G47.33       63-year-old male with history of COPD, chronic hypoxic respiratory failure on 3 L home oxygen, former smoker (quit 2001), HFrEF, CHARLENE (AHI 5), Crohn's disease (on mesalamine), 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    #COPD/Asthma overlap  - starting Advair & Spiriva  - cont PRN albuterol    #Hypoxic respiratory failure  New home oxygen therapy request placed    #CHARLENE  Follow up with sleep medicine    RTC in 3 months         Henry Panchal MD  10/09/2023  "

## 2023-10-09 NOTE — PATIENT INSTRUCTIONS
Thank you for visiting us Mr. Francisco!  We discussed the following today:  - we reviewed your breathing test & we think you may benefit from inhalers. We prescribed Advair inhaler twice a day & Spiriva inhaler once a day. Please rinse your mouth after each use.  - We reviewed your walking test with oxygen, we placed home oxygen therapy request.    We will see you in 3 months

## 2023-11-03 ENCOUNTER — APPOINTMENT (OUTPATIENT)
Dept: GASTROENTEROLOGY | Facility: HOSPITAL | Age: 63
End: 2023-11-03
Payer: MEDICAID

## 2023-11-14 ENCOUNTER — HOSPITAL ENCOUNTER (OUTPATIENT)
Dept: RADIOLOGY | Facility: HOSPITAL | Age: 63
Discharge: HOME | End: 2023-11-14
Payer: MEDICAID

## 2023-11-14 ENCOUNTER — APPOINTMENT (OUTPATIENT)
Dept: PULMONOLOGY | Facility: HOSPITAL | Age: 63
End: 2023-11-14
Payer: MEDICAID

## 2023-11-14 DIAGNOSIS — K86.9 DISEASE OF PANCREAS, UNSPECIFIED (HHS-HCC): ICD-10-CM

## 2023-11-14 DIAGNOSIS — K86.2 CYST OF PANCREAS (HHS-HCC): ICD-10-CM

## 2023-11-14 PROCEDURE — 76376 3D RENDER W/INTRP POSTPROCES: CPT | Performed by: RADIOLOGY

## 2023-11-14 PROCEDURE — 74183 MRI ABD W/O CNTR FLWD CNTR: CPT | Performed by: RADIOLOGY

## 2023-11-14 PROCEDURE — A9575 INJ GADOTERATE MEGLUMI 0.1ML: HCPCS | Mod: SE | Performed by: PHYSICIAN ASSISTANT

## 2023-11-14 PROCEDURE — 2550000001 HC RX 255 CONTRASTS: Mod: SE | Performed by: PHYSICIAN ASSISTANT

## 2023-11-14 PROCEDURE — 74183 MRI ABD W/O CNTR FLWD CNTR: CPT

## 2023-11-14 RX ORDER — GADOTERATE MEGLUMINE 376.9 MG/ML
20 INJECTION INTRAVENOUS
Status: COMPLETED | OUTPATIENT
Start: 2023-11-14 | End: 2023-11-14

## 2023-11-14 RX ADMIN — GADOTERATE MEGLUMINE 17 ML: 376.9 INJECTION INTRAVENOUS at 10:43

## 2023-11-16 DIAGNOSIS — R33.8 BENIGN PROSTATIC HYPERPLASIA WITH URINARY RETENTION: Primary | ICD-10-CM

## 2023-11-16 DIAGNOSIS — N40.1 BENIGN PROSTATIC HYPERPLASIA WITH URINARY RETENTION: Primary | ICD-10-CM

## 2023-11-17 ENCOUNTER — OFFICE VISIT (OUTPATIENT)
Dept: SURGICAL ONCOLOGY | Facility: CLINIC | Age: 63
End: 2023-11-17
Payer: MEDICAID

## 2023-11-17 ENCOUNTER — LAB (OUTPATIENT)
Dept: LAB | Facility: LAB | Age: 63
End: 2023-11-17
Payer: MEDICAID

## 2023-11-17 VITALS
TEMPERATURE: 96.8 F | HEIGHT: 71 IN | OXYGEN SATURATION: 95 % | BODY MASS INDEX: 25.9 KG/M2 | DIASTOLIC BLOOD PRESSURE: 97 MMHG | HEART RATE: 83 BPM | RESPIRATION RATE: 18 BRPM | SYSTOLIC BLOOD PRESSURE: 150 MMHG | WEIGHT: 184.97 LBS

## 2023-11-17 DIAGNOSIS — D37.8 NEOPLASM OF UNCERTAIN BEHAVIOR OF HEAD OF PANCREAS: Primary | ICD-10-CM

## 2023-11-17 DIAGNOSIS — Q45.3 ABNORMALITY OF PANCREATIC DUCT: ICD-10-CM

## 2023-11-17 DIAGNOSIS — K86.2 PANCREATIC CYST (HHS-HCC): Primary | ICD-10-CM

## 2023-11-17 DIAGNOSIS — D37.8 NEOPLASM OF UNCERTAIN BEHAVIOR OF HEAD OF PANCREAS: ICD-10-CM

## 2023-11-17 PROCEDURE — 86301 IMMUNOASSAY TUMOR CA 19-9: CPT

## 2023-11-17 PROCEDURE — 3077F SYST BP >= 140 MM HG: CPT | Performed by: PHYSICIAN ASSISTANT

## 2023-11-17 PROCEDURE — 99205 OFFICE O/P NEW HI 60 MIN: CPT | Performed by: PHYSICIAN ASSISTANT

## 2023-11-17 PROCEDURE — 99215 OFFICE O/P EST HI 40 MIN: CPT | Performed by: PHYSICIAN ASSISTANT

## 2023-11-17 PROCEDURE — 3080F DIAST BP >= 90 MM HG: CPT | Performed by: PHYSICIAN ASSISTANT

## 2023-11-17 PROCEDURE — 36415 COLL VENOUS BLD VENIPUNCTURE: CPT

## 2023-11-17 PROCEDURE — 1036F TOBACCO NON-USER: CPT | Performed by: PHYSICIAN ASSISTANT

## 2023-11-17 RX ORDER — TAMSULOSIN HYDROCHLORIDE 0.4 MG/1
0.4 CAPSULE ORAL DAILY
Qty: 90 CAPSULE | Refills: 10 | Status: SHIPPED | OUTPATIENT
Start: 2023-11-17

## 2023-11-17 ASSESSMENT — PATIENT HEALTH QUESTIONNAIRE - PHQ9
SUM OF ALL RESPONSES TO PHQ9 QUESTIONS 1 AND 2: 0
1. LITTLE INTEREST OR PLEASURE IN DOING THINGS: NOT AT ALL
2. FEELING DOWN, DEPRESSED OR HOPELESS: NOT AT ALL

## 2023-11-17 ASSESSMENT — COLUMBIA-SUICIDE SEVERITY RATING SCALE - C-SSRS
1. IN THE PAST MONTH, HAVE YOU WISHED YOU WERE DEAD OR WISHED YOU COULD GO TO SLEEP AND NOT WAKE UP?: NO
6. HAVE YOU EVER DONE ANYTHING, STARTED TO DO ANYTHING, OR PREPARED TO DO ANYTHING TO END YOUR LIFE?: NO
2. HAVE YOU ACTUALLY HAD ANY THOUGHTS OF KILLING YOURSELF?: NO

## 2023-11-17 ASSESSMENT — PAIN SCALES - GENERAL: PAINLEVEL: 0-NO PAIN

## 2023-11-17 NOTE — PROGRESS NOTES
"Subjective   Mr. Francisco is a 63-year-old male who is here to discuss surveillance MRI results for a pancreatic uncinate cyst.    He saw Dr. Erik Curtis in 2021 to discuss a pancreatic uncinate cyst that was identified incidentally on imaging dating back to 2016. 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 He also had a  drawn at that time which was 18.9.    He recently had a surveillance MRCP on 11/14/23. This demonstrates a 2.0 cm x 1.5 cm cyst in the uncinate with diffuse pancreatic ductal dilatation measuring up to 0.7 cm and biliary ductal dilatation at 1.1 cm, both increased since prior imaging.     Today, he has no complaints. He denies a personal history of acute pancreatitis. He denies chronic abdominal pain, early satiety, poor appetite, jaundice or unintentional weight loss. He is not diabetic.    Medical history: HTN, HLD, HFrEF, chronic hypoxic respiratory failure on 3 liters of home oxygen (as needed), Crohn’s colitis (on mesalamine), history of heroin use (on methadone)  Surgical history: No prior abdominal surgeries.  Family history: No pancreatitis or pancreatic cancer.   Social history: Former smoker, quit in 2001. Occasional alcohol use. , wife is Cyn.       Objective     BP (!) 150/97   Pulse 83   Temp 36 °C (96.8 °F)   Resp 18   Ht 1.813 m (5' 11.38\")   Wt 83.9 kg (184 lb 15.5 oz)   SpO2 95%   BMI 25.53 kg/m²      Physical Exam  General: in no acute distress, comfortable  Eyes: no pallor or scleral icterus  Ears, nose, throat: no oropharyngeal edema  Cardiovascular: normal rate, regular rhythm  Respiratory: clear breath sounds, symmetric, no wheezes  Gastrointestinal: abdomen soft, non-tender, no masses  Musculoskeletal: normal gate, no deformities  Integumentary: no concerning lesions, no jaundice  Lymphatic: no abnormally palpable lymph nodes  Neurologic: no gross deficits  Psychiatric: cognition intact, mood " appropriate    Assessment/Plan   Mr. Francisco is a 63-year-old male who is here to discuss surveillance MRI results for a pancreatic uncinate cyst.    PLAN: He has a 2.0 cm cyst in the pancreatic uncinate that has been presently and relatively stable since 2016. There has been some growth though not rapid or concerning. 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 Of note, recent MRI notes progressive or more conspicuous dilatation of the biliary tree and main pancreatic duct. Given this finding, I will review his imaging at our multidisciplinary pancreas conference with our expert radiologist and pancreatic surgeons. If concern for an ampullary neoplasm is low, will continue to follow with surveillance. Otherwise, will consider pursuing endoscopic evaluation of the ampulla. I have also ordered a . I will call him with an update after conference.       Amanda Edwards PA-C

## 2023-11-18 LAB — CANCER AG19-9 SERPL-ACNC: 49.37 U/ML

## 2023-11-22 ENCOUNTER — TELEPHONE (OUTPATIENT)
Dept: SURGICAL ONCOLOGY | Facility: CLINIC | Age: 63
End: 2023-11-22
Payer: MEDICAID

## 2023-11-22 ENCOUNTER — TELEPHONE (OUTPATIENT)
Dept: GASTROENTEROLOGY | Facility: HOSPITAL | Age: 63
End: 2023-11-22
Payer: MEDICAID

## 2023-11-22 DIAGNOSIS — Q45.3 PANCREATIC DUCTAL ABNORMALITY: ICD-10-CM

## 2023-11-22 DIAGNOSIS — K86.2 PANCREATIC CYST (HHS-HCC): Primary | ICD-10-CM

## 2023-11-22 NOTE — TELEPHONE ENCOUNTER
His imaging was reviewed at our multidisciplinary pancreas conference this morning.     The main pancreatic duct is diffusely dilated to 7 mm. There is a cyst in the uncinate that is unchanged. The CBD is 10 mm. The dilatation of the main pancreatic duct has progressively worsened.    The group questions if this could represent a mixed type/main duct IPMN.     Recommendation is for EGD/EUS for assessment of the ampulla, rule out mass, evaluate for fish mouth appearence or mucin extrusion.     I called Mr. Francisco with an update and spoke to his wife, Cyn (he was in the background). They agree with the plan for EUS and prefer [facility].     I discussed the case with Dr. Darcy Pettit and will work to arrange with scheduling.     Amanda Edwards PA-C  Surgical Oncology

## 2023-11-27 ENCOUNTER — OFFICE VISIT (OUTPATIENT)
Dept: PULMONOLOGY | Facility: HOSPITAL | Age: 63
End: 2023-11-27
Payer: MEDICAID

## 2023-11-27 VITALS
WEIGHT: 187 LBS | BODY MASS INDEX: 25.81 KG/M2 | HEART RATE: 73 BPM | TEMPERATURE: 97.5 F | OXYGEN SATURATION: 93 % | SYSTOLIC BLOOD PRESSURE: 118 MMHG | DIASTOLIC BLOOD PRESSURE: 75 MMHG

## 2023-11-27 DIAGNOSIS — J43.9 PULMONARY EMPHYSEMA, UNSPECIFIED EMPHYSEMA TYPE (MULTI): Primary | ICD-10-CM

## 2023-11-27 DIAGNOSIS — J96.11 CHRONIC HYPOXIC RESPIRATORY FAILURE (MULTI): ICD-10-CM

## 2023-11-27 DIAGNOSIS — G47.33 OSA ON CPAP: ICD-10-CM

## 2023-11-27 DIAGNOSIS — G47.34 NOCTURNAL HYPOXIA: ICD-10-CM

## 2023-11-27 PROCEDURE — 99214 OFFICE O/P EST MOD 30 MIN: CPT | Performed by: NURSE PRACTITIONER

## 2023-11-27 PROCEDURE — 3074F SYST BP LT 130 MM HG: CPT | Performed by: NURSE PRACTITIONER

## 2023-11-27 PROCEDURE — 3078F DIAST BP <80 MM HG: CPT | Performed by: NURSE PRACTITIONER

## 2023-11-27 PROCEDURE — 1036F TOBACCO NON-USER: CPT | Performed by: NURSE PRACTITIONER

## 2023-11-27 RX ORDER — PREDNISONE 20 MG/1
40 TABLET ORAL DAILY
Qty: 10 TABLET | Refills: 0 | Status: SHIPPED | OUTPATIENT
Start: 2023-11-27 | End: 2023-12-02

## 2023-11-27 ASSESSMENT — PATIENT HEALTH QUESTIONNAIRE - PHQ9
1. LITTLE INTEREST OR PLEASURE IN DOING THINGS: NOT AT ALL
SUM OF ALL RESPONSES TO PHQ9 QUESTIONS 1 & 2: 0
2. FEELING DOWN, DEPRESSED OR HOPELESS: NOT AT ALL

## 2023-11-27 ASSESSMENT — ENCOUNTER SYMPTOMS
PALPITATIONS: 0
FEVER: 0
JOINT SWELLING: 0
EYE ITCHING: 1
RHINORRHEA: 0
DIARRHEA: 0
VOMITING: 0
MYALGIAS: 0
AGITATION: 0
NAUSEA: 0
NERVOUS/ANXIOUS: 0
NUMBNESS: 0
BACK PAIN: 0
HEADACHES: 0
EYE PAIN: 0
ARTHRALGIAS: 0
DIZZINESS: 0
SINUS PRESSURE: 0
ABDOMINAL PAIN: 0
VOICE CHANGE: 0
FATIGUE: 0
WEAKNESS: 0

## 2023-11-27 ASSESSMENT — LIFESTYLE VARIABLES: HOW OFTEN DO YOU HAVE A DRINK CONTAINING ALCOHOL: MONTHLY OR LESS

## 2023-11-27 ASSESSMENT — PAIN SCALES - GENERAL: PAINLEVEL: 0-NO PAIN

## 2023-11-27 NOTE — PATIENT INSTRUCTIONS
COPD/Asthma: PFTs with sever obstruction with BD resp   - continue advair 250/50 - 1 inhalation twice daily - rinse mouth out afterwards   - continue spirivia daily  - continue albuterol HFA inhaler 2 puffs or albuterol nebulizer treatment every 4-6 hours as needed for shortness of breath      2. Chronic hypoxic respiratory failure: O2 titration on 10/9/23 showed he needs 4L with exertion   - continue 3L with sleep and exertion   - purchase pulse ox to monitor oxygen levels   - will order you humidification for your home concentrator     3. Mild CHARLENE: last sleep study in [facility]  - make appt with sleep medicine     Thank you for visiting the Pulmonary clinic today!   Return to clinic  3 months  or sooner if needed   Janette Bermeo CNP  My office number is [phone]  Mayte is my  and Denise is my nurse.   Radiology scheduling [phone]   Appointment scheduling [phone]    
No

## 2023-11-27 NOTE — PROGRESS NOTES
Patient: Arnaldo Francisco    [id number]  : 1960 -- AGE 63 y.o.    Provider: CLAIR Colby-CNP     Location [facility]   Service Date: 2023              [facility]  Follow up visit note      HISTORY OF PRESENT ILLNESS       HISTORY OF PRESENT ILLNESS     Mr. Francisco states his breathing has been doing better. He has been using his advair daily, spiriva daily, and albuterol 0-4 x per day. He has oxygen at home - he wears it when he is exerting himself. He has a home portable oxygen concentrator - has not used it a lot. He has been wearing his oxygen at [facility] at 3L. He does not have humidification for his home concentrator. He has a nebulizer at home  and medication to go in it.  He denies any cough, wheezing, SOB at rest, or GERD. He has IBARRA with walking garbage out to the curb - some days he has IBARRA with walking from his bed to the bathroom (not all the time).  He denies allergy symptoms.     ACT today 17 / CAT today 11     Last seen in clinic on 10/9/23 by Dr. Panchal     ALLERGIES AND MEDICATIONS     ALLERGIES  Allergies   Allergen Reactions    Ibuprofen Unknown    Shellfish Containing Products Unknown       MEDICATIONS  Current Outpatient Medications   Medication Sig Dispense Refill    albuterol (Ventolin HFA) 90 mcg/actuation inhaler Inhale.      albuterol 0.63 mg/3 mL nebulizer solution Take 3 mL (0.63 mg) by nebulization 4 times a day as needed.      albuterol 90 mcg/actuation inhaler Inhale 2 puffs every 4 hours if needed.      amLODIPine (Norvasc) 5 mg tablet Take 1 tablet (5 mg) by mouth once daily.      aspirin 81 mg EC tablet Take 1 tablet (81 mg) by mouth once daily.      carvedilol (Coreg) 6.25 mg tablet Take 1 tablet (6.25 mg) by mouth.      cetirizine (ZyrTEC) 10 mg tablet Take by mouth once daily.      cyclobenzaprine (Flexeril) 10 mg tablet Take by mouth every 8 hours if needed.      dapagliflozin (Farxiga) 10 mg  Take 1 tablet (10 mg) by mouth once daily.      fluticasone propion-salmeteroL (Advair Diskus) 250-50 mcg/dose diskus inhaler Inhale 1 puff 2 times a day. Rinse mouth with water after use to reduce aftertaste and incidence of candidiasis. Do not swallow. 40 each 2    gabapentin (Neurontin) 600 mg tablet Take 1 tablet (600 mg) by mouth 3 times a day.      isosorbide mononitrate ER (Imdur) 60 mg 24 hr tablet Take 1 tablet (60 mg) by mouth once daily.      methadone (Dolophine) 10 mg/5 mL solution 60 mL (120 mg) once daily.      metoprolol succinate XL (Toprol-XL) 200 mg 24 hr tablet Take 1 tablet (200 mg) by mouth once daily.      nitroglycerin (Nitrostat) 0.4 mg SL tablet Place under the tongue. Dissolve 1 tab under the tongue as needed for chest pain every 5 minutes up to 3 times if no relief call 911      acetaminophen (Tylenol) 500 mg tablet Take 1 tablet (500 mg) by mouth every 8 hours if needed.      diclofenac sodium 1 % kit Apply 4 g topically every 6 hours if needed.      diphenhydrAMINE (BENADryl) 25 mg capsule Take by mouth.      doxycycline (Monodox) 100 mg capsule Take by mouth.      Entresto 24-26 mg tablet Take 1 tablet by mouth 2 times a day.      Entresto 49-51 mg tablet Take 1 tablet by mouth 2 times a day.      Entresto  mg tablet Take 1 tablet by mouth 2 times a day.      ferric carboxymaltose (Injectafer) 50 mg iron/mL injection Infuse into a venous catheter.      ferrous sulfate 325 (65 Fe) MG tablet Take 1 tablet by mouth once daily.      folic acid (Folvite) 1 mg tablet Take by mouth.      hydrALAZINE (Apresoline) 100 mg tablet Take 1 tablet (100 mg) by mouth every 8 hours.      hydrocortisone acetate 1 % cream Apply topically twice a day.      levocetirizine (Xyzal) 5 mg tablet Take by mouth once daily.      lisinopril 20 mg tablet Take 1 tablet (20 mg) by mouth once daily.      lisinopril 40 mg tablet Take 1 tablet (40 mg) by mouth once daily.      mesalamine (Lialda) 1.2 gram EC tablet  Take 1 tablet (1.2 g) by mouth in the morning and 1 tablet (1.2 g) before bedtime. (Patient not taking: Reported on 11/27/2023) 180 tablet 1    mesalamine (Lialda) 1.2 gram EC tablet Do not crush, chew, or split. Take 2 tablets twice a day (Patient not taking: Reported on 10/9/2023) 120 tablet 11    methadone (Dolophine) 10 mg tablet Take by mouth once every 24 hours.      pantoprazole (ProtoNix) 40 mg EC tablet Take 1 tablet (40 mg) by mouth 2 times a day. (Patient not taking: Reported on 10/9/2023) 60 tablet 0    pravastatin (Pravachol) 40 mg tablet Take 1 tablet (40 mg) by mouth once daily at bedtime.      Pylera 140-125-125 mg capsule TAKE 3 CAPSULES BY MOUTH 4 TIMES A DAY FOR 2 WEEKS, OLD IRON FOR THESE 2 WEEKS      simethicone (Mylicon) 80 mg chewable tablet Chew.      spironolactone (Aldactone) 25 mg tablet Take 1 tablet (25 mg) by mouth once daily.      tadalafil 20 mg tablet Take by mouth.      tamsulosin (Flomax) 0.4 mg 24 hr capsule TAKE 1 CAPSULE BY MOUTH ONCE DAILY 90 capsule 10    tiotropium (Spiriva Respimat) 2.5 mcg/actuation inhaler Inhale 2 puffs once daily. (Patient not taking: Reported on 11/27/2023) 1 each 2    torsemide (Demadex) 20 mg tablet Take 1 tablet (20 mg) by mouth once daily.      Voltaren Arthritis Pain 1 % gel gel        No current facility-administered medications for this visit.     -- patient to clean up med list via [employer] at home - does not know current meds today     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   - 12/2020 COVID pneumonia   - TIA     PAST SURGICAL HISTORY  Past Surgical History:   Procedure Laterality Date    OTHER SURGICAL HISTORY  06/07/2021    Colonoscopy       IMMUNIZATION HISTORY  Immunization History   Administered Date(s) Administered    Flu vaccine (IIV4), preservative free *Check age/dose* 11/25/2014, 10/10/2017, 09/16/2021, 11/04/2022    Influenza, Unspecified 11/04/2022     Influenza, injectable, MDCK, preservative free, quadrivalent 10/21/2020    Moderna SARS-CoV-2 Vaccination 03/18/2021    Pfizer COVID-19 vaccine, bivalent, age 5y-11y (10 mcg/0.2 mL) 11/04/2022    Pneumococcal polysaccharide vaccine, 23-valent, age 2 years and older (PNEUMOVAX 23) 11/25/2014, 11/04/2022    Pneumococcal, Unspecified 11/25/2014    SARS-CoV-2, Unspecified 08/03/2022    Td (adult), unspecified 08/13/2014    Tdap vaccine, age 7 year and older (BOOSTRIX) 02/12/2016       SOCIAL HISTORY  Smoking: former - 1ppd 22 years - quit in 2001 ~22 pack years   Alcohol: previously occasionally -- stopped over the last few weeks   Illicit drugs:  heroin - 9-10 years     OCCUPATIONAL/ENVIRONMENTAL HISTORY  Previously worked as / [employer].     FAMILY HISTORY  Brother - asthma   Sister - passed away -- needed ? Double lung transplant, but not able to get it - unsure exact diagnosis     RESULTS/DATA     Pulmonary Function Test Results       10/9/23 - FEV1/ FVC 0.32/ FEV1 1.32 (42% + BD resp)/ FVC 3.15 (78%)/ TLC 7.33 (112%)/ DLCO 57%     O2 desat - 10/9/23 - 93 RA at rest -> 82 RA with exertion, 84 2L at rest -> 88% on 2L with exertion, 98% on 4L at rest -> 93% on 4L with exertion     Chest Radiograph     XR chest 2 views - 12/7/22 -   Chronic changes with signs of COPD and parenchymal scarring. No acute  infiltrates. Enlarged cardiac silhouette.  Dictation workstation:   [id number]      Chest CT Scan     Previous scans in 2016/2017 8/28/18 -  No sign of pulmonary embolism. 2. Bilateral pulmonary bullous changes with no sign of pneumothorax  or pleural effusion.    4/2/20 -Moderate to advanced emphysema with upper lobe predominance.  Bilateral parenchymal scarring as described. Stable infrahilar left lower lobe partially calcified granuloma. There is biliary ductal dilatation and pancreatic ductal dilatation. Please note that this was a CT scan chest and therefore the pancreas was not completely  included. The patient previously had a known nonspecific cystic nodular structure within the pancreatic head inferiorly near the uncinate process. Recommend a dedicated CT of the pancreas or pancreas MRI to evaluate for enlargement of that cystic lesion and to exclude enlarging cystic pancreatic neoplasm.      Echocardiogram     Echocardiogram - 8/4/23 -   1. Left ventricular systolic function is mildly decreased with a 40-45% estimated ejection fraction.  2. Spectral Doppler shows a pseudonormal pattern of left ventricular diastolic filling.  3. There is global hypokinesis of the left ventricle with minor regional variations.  RA is upper limits of normal is size. RV normal size/ RV systolic function       Labs/ Other testing        REVIEW OF SYSTEMS     REVIEW OF SYSTEMS  Review of Systems   Constitutional:  Negative for fatigue and fever.   HENT:  Negative for congestion, postnasal drip, rhinorrhea, sinus pressure and voice change.    Eyes:  Positive for itching. Negative for pain and visual disturbance.   Cardiovascular:  Negative for chest pain, palpitations and leg swelling.   Gastrointestinal:  Negative for abdominal pain, diarrhea, nausea and vomiting.   Endocrine: Negative for cold intolerance and heat intolerance.   Musculoskeletal:  Negative for arthralgias, back pain, joint swelling and myalgias.   Skin:  Negative for rash.   Neurological:  Negative for dizziness, weakness, numbness and headaches.   Psychiatric/Behavioral:  Negative for agitation. The patient is not nervous/anxious.          PHYSICAL EXAM     VITAL SIGNS: Temp 36.4 °C (97.5 °F)   Wt 84.8 kg (187 lb)   BMI 25.81 kg/m²      CURRENT WEIGHT: [unfilled]  BMI: [unfilled]  PREVIOUS WEIGHTS:  Wt Readings from Last 3 Encounters:   11/27/23 84.8 kg (187 lb)   11/17/23 83.9 kg (184 lb 15.5 oz)   10/09/23 87.1 kg (192 lb)       Physical Exam  Vitals reviewed.   Constitutional:       General: He is not in acute distress.     Appearance: Normal  appearance. He is not ill-appearing or toxic-appearing.   HENT:      Head: Normocephalic.      Nose: No rhinorrhea.   Cardiovascular:      Rate and Rhythm: Normal rate and regular rhythm.      Heart sounds: Normal heart sounds.   Pulmonary:      Effort: Pulmonary effort is normal. No respiratory distress.      Breath sounds: Normal breath sounds. No stridor.      Comments: Diminished   Abdominal:      General: Abdomen is flat.   Musculoskeletal:         General: No swelling. Normal range of motion.   Skin:     General: Skin is warm and dry.      Nails: There is no clubbing.   Neurological:      General: No focal deficit present.      Mental Status: He is alert.   Psychiatric:         Mood and Affect: Mood normal.         Behavior: Behavior normal.         Judgment: Judgment normal.         ASSESSMENT/PLAN     COPD/Asthma: PFTs with sever obstruction with BD resp   - continue advair 250/50 - 1 inhalation twice daily - rinse mouth out afterwards   - continue spirivia jorge l  - continue albuterol HFA inhaler 2 puffs or albuterol nebulizer treatment every 4-6 hours as needed for shortness of breath    - increase activity as tolerated - discussed using his treadmill at home     2. Chronic hypoxic respiratory failure: O2 titration on 10/9/23 showed he needs 4L with exertion   - continue 3L with sleep and exertion   - purchase pulse ox to monitor oxygen levels   - will order you humidification for your home concentrator     3. Mild CHARLENE: last sleep study in [facility]  - make appt with sleep medicine

## 2023-11-28 ENCOUNTER — APPOINTMENT (OUTPATIENT)
Dept: PRIMARY CARE | Facility: CLINIC | Age: 63
End: 2023-11-28
Payer: MEDICAID

## 2023-11-30 ENCOUNTER — OFFICE VISIT (OUTPATIENT)
Dept: SLEEP MEDICINE | Facility: CLINIC | Age: 63
End: 2023-11-30
Payer: MEDICAID

## 2023-11-30 DIAGNOSIS — G47.61 PERIODIC LIMB MOVEMENTS OF SLEEP: ICD-10-CM

## 2023-11-30 DIAGNOSIS — F11.20 METHADONE DEPENDENCE (MULTI): ICD-10-CM

## 2023-11-30 DIAGNOSIS — G47.34 NOCTURNAL HYPOXIA: ICD-10-CM

## 2023-11-30 DIAGNOSIS — G47.33 OSA (OBSTRUCTIVE SLEEP APNEA): Primary | ICD-10-CM

## 2023-11-30 PROBLEM — J96.11 CHRONIC RESPIRATORY FAILURE WITH HYPOXIA (MULTI): Status: ACTIVE | Noted: 2023-05-25

## 2023-11-30 PROBLEM — R94.30 LOW LEFT VENTRICULAR EJECTION FRACTION: Status: ACTIVE | Noted: 2023-11-30

## 2023-11-30 PROBLEM — R93.1 LOW LEFT VENTRICULAR EJECTION FRACTION: Status: ACTIVE | Noted: 2023-11-30

## 2023-11-30 PROBLEM — E61.1 IRON DEFICIENCY: Status: ACTIVE | Noted: 2023-05-02

## 2023-11-30 PROBLEM — Z86.73 HISTORY OF CEREBROVASCULAR ACCIDENT: Status: ACTIVE | Noted: 2023-11-30

## 2023-11-30 PROCEDURE — 99204 OFFICE O/P NEW MOD 45 MIN: CPT | Performed by: PSYCHIATRY & NEUROLOGY

## 2023-11-30 PROCEDURE — 1036F TOBACCO NON-USER: CPT | Performed by: PSYCHIATRY & NEUROLOGY

## 2023-11-30 RX ORDER — OMEPRAZOLE 40 MG/1
CAPSULE, DELAYED RELEASE ORAL
COMMUNITY
Start: 2018-08-14 | End: 2023-11-30 | Stop reason: ALTCHOICE
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
## 2023-12-05 ENCOUNTER — OFFICE VISIT (OUTPATIENT)
Dept: GASTROENTEROLOGY | Facility: HOSPITAL | Age: 63
End: 2023-12-05
Payer: MEDICAID

## 2023-12-05 ENCOUNTER — APPOINTMENT (OUTPATIENT)
Dept: PRIMARY CARE | Facility: CLINIC | Age: 63
End: 2023-12-05
Payer: MEDICAID

## 2023-12-05 VITALS
OXYGEN SATURATION: 95 % | WEIGHT: 191.7 LBS | TEMPERATURE: 96.7 F | SYSTOLIC BLOOD PRESSURE: 132 MMHG | RESPIRATION RATE: 16 BRPM | DIASTOLIC BLOOD PRESSURE: 76 MMHG | BODY MASS INDEX: 26.45 KG/M2 | HEART RATE: 54 BPM

## 2023-12-05 DIAGNOSIS — K52.9 COLITIS: ICD-10-CM

## 2023-12-05 DIAGNOSIS — B96.81 GASTRITIS, HELICOBACTER PYLORI: ICD-10-CM

## 2023-12-05 DIAGNOSIS — K29.70 GASTRITIS, HELICOBACTER PYLORI: ICD-10-CM

## 2023-12-05 DIAGNOSIS — K50.10 CROHN'S DISEASE OF LARGE INTESTINE WITHOUT COMPLICATION (MULTI): ICD-10-CM

## 2023-12-05 PROCEDURE — 1036F TOBACCO NON-USER: CPT | Performed by: STUDENT IN AN ORGANIZED HEALTH CARE EDUCATION/TRAINING PROGRAM

## 2023-12-05 PROCEDURE — 3075F SYST BP GE 130 - 139MM HG: CPT | Performed by: STUDENT IN AN ORGANIZED HEALTH CARE EDUCATION/TRAINING PROGRAM

## 2023-12-05 PROCEDURE — 3078F DIAST BP <80 MM HG: CPT | Performed by: STUDENT IN AN ORGANIZED HEALTH CARE EDUCATION/TRAINING PROGRAM

## 2023-12-05 PROCEDURE — 99215 OFFICE O/P EST HI 40 MIN: CPT | Performed by: STUDENT IN AN ORGANIZED HEALTH CARE EDUCATION/TRAINING PROGRAM

## 2023-12-05 RX ORDER — MESALAMINE 1.2 G/1
1200 TABLET, DELAYED RELEASE ORAL 2 TIMES DAILY
Qty: 60 TABLET | Refills: 11 | Status: SHIPPED | OUTPATIENT
Start: 2023-12-05 | End: 2024-03-19 | Stop reason: SDUPTHER

## 2023-12-05 ASSESSMENT — PAIN SCALES - GENERAL: PAINLEVEL: 0-NO PAIN

## 2023-12-05 ASSESSMENT — PATIENT HEALTH QUESTIONNAIRE - PHQ9
SUM OF ALL RESPONSES TO PHQ9 QUESTIONS 1 AND 2: 0
2. FEELING DOWN, DEPRESSED OR HOPELESS: NOT AT ALL
1. LITTLE INTEREST OR PLEASURE IN DOING THINGS: NOT AT ALL

## 2023-12-05 NOTE — PROGRESS NOTES
This is a 64yo M w/ PMH of severe COPD on supplemental O2 at night , CHARLENE, CHF, heroine abuse in the past now on methadone, IPMN, Crohn's colitis who comes in for f/u.      Prior hx:     Per pt he was dxed in 2001 with presenting sx of BRBPR. He was on asacol for  several years before being switched to sulfasalazine. He had been on  sulfasalazine 1.5g a day. He had never been on any biologics or  immunomodulators.     Fecal calprotectin in June 2021 was 1680 and he was started on lialda in June 2021.        C-scope 5/2021: - The distal rectum and anal verge are normal on retroflexion  view. Four 2 to 3 mm polyps in the descending colon, removed with a cold biopsy  forceps. One 3 mm polyp in the ascending colon, removed with a cold biopsy  forceps. Resected and retrieved (path: adenoma w/ LGD). Erythematous mucosa at  the appendiceal orifice. Biopsied. Regional biopsies obtained in four quadrant  fashion from 70cm to 10 cm- at least 32 biopsies obtained in total  only visibile abnormalities: pseudopolyps throughout the colon. erythematous and  friable Ao. scarring significant from transverse colon to sigmoid (path: no e/o  granulomas or dysplasia or active colitis). Tried to get into TI but could not.     MRI pancreas 8/2022: unchanged [id number] panc uncinate process septated cystic lesion, likely side branch IPMN     MRI pancreas w/wo contrast 6/2021: 1. No pancreatic mass. No biliary ductal dilatation  or pancreatic duct dilatation. No choledocholithiasis. 2. A 2 cm septated cyst in the uncinate  process, does not communicate with the main pancreatic duct, may represent side branch IPMN. No abnormal  enhancement. Follow-up.     CTE 6/2021: No evidence of acute inflammatory change of the large or small  bowel. The terminal ileum appears normal. No stricture, obstruction,  fistula, or abscess. Prominent colonic stool noted, correlate for  constipation.     EGD July 2023: HP gastritis.     C-scope July 2023:  Endoscopically mild inflammation from transverse to rectum, histologically normal entire colon except for chronic quiescent colitis in rectum.     Interval hx: He has been on lialda 2.4g a day. He endorses 1-2 brown semi-solid BMs a day. He denies any abd pain, diarrhea, urgency or BRBPR.     Dyspepsia is improved w/ pantoprazole once daily. He denies n/v/d, melena, BRBPR, wt loss, f/c, 12 point ROS done and neg unless otherwise stated.     EIMs: denies mucositis, eye abnl, joint pains; endorses intermittent skin blisters in b/l thighs    He saw surgery follow up and they ordered surveillance MRI/MRCP on Nov 2023 which showed:  Stable cystic nonenhancing pancreatic uncinate lesion, likely  representing a side-branch IPMN. Mild increase in diffuse prominence of the pancreatic duct, of doubtful clinical significance given lack of additional new findings. Mild interval increase in prominence of the extrahepatic bile duct without evidence of an obstructive mass.     UBT in Sept 2023 was positive, recall that EGD in July 2023 was c/w HP GASTRITIS s/p bismuth quadruple therapy.     Salvage therapy with rifabutin, amoxicillin and PPI was ordered in Oct 2023 and pt has completed it.     Labs Aug-Nov 2023: Hb 14.9, plt 230, CA 19-9 elevated at 49, H.pylori breath test pos in Sept 2023, ferritin 79, TIBC% 21%, Cr 1.03, LFTs WNL     Labs May 2023: Hb 13.6, plt 223, CRP 0.23, Fecal calpro 111,      Labs April 2023: Hb 11.5, plt 278, ferritin 92, TIBC% 11% Cr 1.15, LFTs WNL, July 2021: CRP WNL, June 2021: fecal calpro 1680           PMH:   - COPD/emphysema ? severe, on supplemental O2 at night   - CHF- HFmrEF and most recent echocardiogram 1/27/2021 shows LVEF 45-50%  - Crohns disease   - CHARLENE   - COVID 19 (dec 2020)   - GERD  - lung nodule   - adenomatous colon polyp with low grade dysplasia  - numerous pseudopolyps of colon   - septated pancreatic cyst      PSH:   - left heart cath (2015)  - r knee arthroplasty (2019)       FH:  No colon cancer, pancreatic cancer  No ulcerative colitis  No Crohns disease     SH:  denies smoking, IVDU. Prior heroine abuse, quit 7 yrs ago, currently on  methadone, endorses occasional alcohol.     PE:  Gen: AXOX3. NAD  Head: NC/AT.   Eyes: anicteric sclerae  CV: No mrg. RRR. NO JVD  Pulm: non labored breathing  Abd: NTND. Tympanitic. no rebound  Ext: no edema  Skin: Non jaundiced      A/P: This is a 64yo M w/ PMH of severe COPD on supplemental O2 at night , CHARLENE, CHF, heroine abuse in the past now on methadone, IPMN, HP gastritis in July 2023, Crohn's colitis who comes in for f/u.      He has had excellent biochemical (CRP, fecal calpro down to 111) and endoscopic response to lialda w/ mild inflammation from transverse to rectum, histologically normal entire colon except for chronic quiescent colitis in rectum. CTE 6/2021 w/o any vicki-anal disease or small or large bowel inflammation.      Notably, he had surveillance MRI pancreas w/wo contrast 11/2023 which showed PD dilatation and mild interval increase in size of cyst w/ CA 19-9 elevated at 49, he has been scheduled for EUS.      UBT in Sept 2023 was positive, recall that EGD in July 2023 was c/w HP GASTRITIS s/p bismuth quadruple therapy.     Salvage therapy with rifabutin, amoxicillin and PPI was ordered in Oct 2023 and pt has completed it.      Overall picture c/w colitis (favor Crohn's, vs UC) with very good clinical, biochemical and endoscopic response to lialda. Active issues include HP gastritis s/p quadruple therapy w/ persistent pos UBT, now s/p rifabutin salvage therapy. Also w/ mild interval increase in panc cyst size w/ PD dilatation, scheduled for EUS.     Plan:  -c/w lialda to 2.4 g daily  -stop PO iron  -EUS for mild interval increase in panc cyst size w/ PD dilatation, and elevated  to 49  -c/w pantoprazole 40mg once daily for now  -s/p rifabutin salvage therapy for HP gastritis, UBT ordered          RTC in 4-6 mths     [facility]  maintenance in IBD: reference for PCP     -Vaccinations: counseled that it is recommended to get COVID-19 (including boosters), inactivated flu, pneumococcal, zoster (shingrix) vaccine.      -Osteoporosis screening by DEXA scan in patients with IBD if any risk factors for osteoporosis (low BMI, >3 mths cumulative steroid exposure, post-menopausal,hypogonadism). Repeat in 5 yrs if initial screen is normal--->>does not apply to this patient currently     -screen all patients with IBD for depression (PHQ9) and anxiety (GAD7) at baseline and annually---->>pt denies s/s of depression    RTC in 4 mths

## 2023-12-05 NOTE — PATIENT INSTRUCTIONS
Thank you for coming to GI clinic today.    -continue with lialda two tablets daily  -stop oral iron  -follow up with Endoscopic ultrasound and surgery clinic for pancreatic cyst  -continue with pantoprazole 40mg once daily for now  -please schedule urea breath test for H.pylori stomach infection    We will see you back in 4 months

## 2023-12-06 ENCOUNTER — ANESTHESIA EVENT (OUTPATIENT)
Dept: GASTROENTEROLOGY | Facility: HOSPITAL | Age: 63
End: 2023-12-06
Payer: MEDICAID

## 2023-12-06 ENCOUNTER — ANESTHESIA (OUTPATIENT)
Dept: GASTROENTEROLOGY | Facility: HOSPITAL | Age: 63
End: 2023-12-06
Payer: MEDICAID

## 2023-12-06 ENCOUNTER — HOSPITAL ENCOUNTER (OUTPATIENT)
Dept: GASTROENTEROLOGY | Facility: HOSPITAL | Age: 63
Setting detail: OUTPATIENT SURGERY
Discharge: HOME | End: 2023-12-06
Payer: MEDICAID

## 2023-12-06 VITALS
WEIGHT: 191.8 LBS | OXYGEN SATURATION: 97 % | TEMPERATURE: 97.3 F | DIASTOLIC BLOOD PRESSURE: 91 MMHG | HEIGHT: 71 IN | RESPIRATION RATE: 15 BRPM | HEART RATE: 76 BPM | SYSTOLIC BLOOD PRESSURE: 123 MMHG | BODY MASS INDEX: 26.85 KG/M2

## 2023-12-06 DIAGNOSIS — Q45.3 PANCREATIC DUCTAL ABNORMALITY: ICD-10-CM

## 2023-12-06 DIAGNOSIS — K86.2 PANCREATIC CYST (HHS-HCC): Primary | ICD-10-CM

## 2023-12-06 LAB
AMYLASE FLD-CCNC: >6000 U/L
CEA FLD-MCNC: 111.1 UG/L
GLUCOSE BLD MANUAL STRIP-MCNC: 101 MG/DL (ref 74–99)
GLUCOSE FLD-MCNC: <10 MG/DL

## 2023-12-06 PROCEDURE — 3700000001 HC GENERAL ANESTHESIA TIME - INITIAL BASE CHARGE

## 2023-12-06 PROCEDURE — 82945 GLUCOSE OTHER FLUID: CPT | Mod: AHULAB | Performed by: INTERNAL MEDICINE

## 2023-12-06 PROCEDURE — 82947 ASSAY GLUCOSE BLOOD QUANT: CPT | Mod: 59

## 2023-12-06 PROCEDURE — 43239 EGD BIOPSY SINGLE/MULTIPLE: CPT | Performed by: INTERNAL MEDICINE

## 2023-12-06 PROCEDURE — 3700000002 HC GENERAL ANESTHESIA TIME - EACH INCREMENTAL 1 MINUTE

## 2023-12-06 PROCEDURE — 88112 CYTOPATH CELL ENHANCE TECH: CPT | Mod: TC | Performed by: INTERNAL MEDICINE

## 2023-12-06 PROCEDURE — 7100000009 HC PHASE TWO TIME - INITIAL BASE CHARGE

## 2023-12-06 PROCEDURE — 2500000004 HC RX 250 GENERAL PHARMACY W/ HCPCS (ALT 636 FOR OP/ED): Performed by: INTERNAL MEDICINE

## 2023-12-06 PROCEDURE — 82378 CARCINOEMBRYONIC ANTIGEN: CPT | Mod: AHULAB | Performed by: INTERNAL MEDICINE

## 2023-12-06 PROCEDURE — A43238 PR EDG INTRMURAL US NEEDLE ASPIRATE/BIOPSY ESOPHAGS: Performed by: ANESTHESIOLOGIST ASSISTANT

## 2023-12-06 PROCEDURE — A43238 PR EDG INTRMURAL US NEEDLE ASPIRATE/BIOPSY ESOPHAGS: Performed by: ANESTHESIOLOGY

## 2023-12-06 PROCEDURE — 2720000007 HC OR 272 NO HCPCS

## 2023-12-06 PROCEDURE — 88305 TISSUE EXAM BY PATHOLOGIST: CPT | Performed by: STUDENT IN AN ORGANIZED HEALTH CARE EDUCATION/TRAINING PROGRAM

## 2023-12-06 PROCEDURE — 82150 ASSAY OF AMYLASE: CPT | Mod: AHULAB | Performed by: INTERNAL MEDICINE

## 2023-12-06 PROCEDURE — 87900 PHENOTYPE INFECT AGENT DRUG: CPT | Performed by: INTERNAL MEDICINE

## 2023-12-06 PROCEDURE — 88112 CYTOPATH CELL ENHANCE TECH: CPT | Performed by: PATHOLOGY

## 2023-12-06 PROCEDURE — 94760 N-INVAS EAR/PLS OXIMETRY 1: CPT

## 2023-12-06 PROCEDURE — 2500000005 HC RX 250 GENERAL PHARMACY W/O HCPCS: Performed by: ANESTHESIOLOGIST ASSISTANT

## 2023-12-06 PROCEDURE — 2500000004 HC RX 250 GENERAL PHARMACY W/ HCPCS (ALT 636 FOR OP/ED): Performed by: ANESTHESIOLOGIST ASSISTANT

## 2023-12-06 PROCEDURE — 43242 EGD US FINE NEEDLE BX/ASPIR: CPT | Performed by: INTERNAL MEDICINE

## 2023-12-06 PROCEDURE — 88305 TISSUE EXAM BY PATHOLOGIST: CPT | Mod: TC,SUR,AHULAB | Performed by: INTERNAL MEDICINE

## 2023-12-06 PROCEDURE — 7100000010 HC PHASE TWO TIME - EACH INCREMENTAL 1 MINUTE

## 2023-12-06 RX ORDER — PROPOFOL 10 MG/ML
INJECTION, EMULSION INTRAVENOUS CONTINUOUS PRN
Status: DISCONTINUED | OUTPATIENT
Start: 2023-12-06 | End: 2023-12-06

## 2023-12-06 RX ORDER — SODIUM CHLORIDE, SODIUM LACTATE, POTASSIUM CHLORIDE, CALCIUM CHLORIDE 600; 310; 30; 20 MG/100ML; MG/100ML; MG/100ML; MG/100ML
20 INJECTION, SOLUTION INTRAVENOUS CONTINUOUS
Status: DISCONTINUED | OUTPATIENT
Start: 2023-12-06 | End: 2023-12-07 | Stop reason: HOSPADM

## 2023-12-06 RX ORDER — MIDAZOLAM HYDROCHLORIDE 1 MG/ML
INJECTION INTRAMUSCULAR; INTRAVENOUS AS NEEDED
Status: DISCONTINUED | OUTPATIENT
Start: 2023-12-06 | End: 2023-12-06

## 2023-12-06 RX ORDER — LIDOCAINE HYDROCHLORIDE 20 MG/ML
INJECTION, SOLUTION EPIDURAL; INFILTRATION; INTRACAUDAL; PERINEURAL AS NEEDED
Status: DISCONTINUED | OUTPATIENT
Start: 2023-12-06 | End: 2023-12-06

## 2023-12-06 RX ORDER — GLYCOPYRROLATE 0.2 MG/ML
INJECTION INTRAMUSCULAR; INTRAVENOUS AS NEEDED
Status: DISCONTINUED | OUTPATIENT
Start: 2023-12-06 | End: 2023-12-06

## 2023-12-06 RX ADMIN — SODIUM CHLORIDE, POTASSIUM CHLORIDE, SODIUM LACTATE AND CALCIUM CHLORIDE 20 ML/HR: 600; 310; 30; 20 INJECTION, SOLUTION INTRAVENOUS at 13:06

## 2023-12-06 RX ADMIN — PROPOFOL 200 MCG/KG/MIN: 10 INJECTION, EMULSION INTRAVENOUS at 13:16

## 2023-12-06 RX ADMIN — GLYCOPYRROLATE 0.1 MG: 0.2 INJECTION INTRAMUSCULAR; INTRAVENOUS at 13:16

## 2023-12-06 RX ADMIN — LIDOCAINE HYDROCHLORIDE 100 MG: 20 INJECTION, SOLUTION EPIDURAL; INFILTRATION; INTRACAUDAL; PERINEURAL at 13:16

## 2023-12-06 RX ADMIN — MIDAZOLAM HYDROCHLORIDE 2 MG: 1 INJECTION INTRAMUSCULAR; INTRAVENOUS at 13:16

## 2023-12-06 RX ADMIN — SODIUM CHLORIDE, POTASSIUM CHLORIDE, SODIUM LACTATE AND CALCIUM CHLORIDE: 600; 310; 30; 20 INJECTION, SOLUTION INTRAVENOUS at 13:14

## 2023-12-06 ASSESSMENT — PAIN - FUNCTIONAL ASSESSMENT
PAIN_FUNCTIONAL_ASSESSMENT: 0-10
PAIN_FUNCTIONAL_ASSESSMENT: VAS (VISUAL ANALOG SCALE)
PAIN_FUNCTIONAL_ASSESSMENT: 0-10

## 2023-12-06 ASSESSMENT — PAIN SCALES - GENERAL
PAINLEVEL_OUTOF10: 0 - NO PAIN

## 2023-12-06 ASSESSMENT — COLUMBIA-SUICIDE SEVERITY RATING SCALE - C-SSRS
6. HAVE YOU EVER DONE ANYTHING, STARTED TO DO ANYTHING, OR PREPARED TO DO ANYTHING TO END YOUR LIFE?: NO
1. IN THE PAST MONTH, HAVE YOU WISHED YOU WERE DEAD OR WISHED YOU COULD GO TO SLEEP AND NOT WAKE UP?: NO
2. HAVE YOU ACTUALLY HAD ANY THOUGHTS OF KILLING YOURSELF?: NO

## 2023-12-06 NOTE — ANESTHESIA PREPROCEDURE EVALUATION
Patient: Arnaldo Francisco    Procedure Information       Date/Time: 12/06/23 1300    Scheduled providers: Darcy Pettit MD; Donta Dutta MD; DALTON Blackburn; Ramila Yeboah RN; Cheri Marsh MA    Procedure: ENDOSCOPIC ULTRASOUND (UPPER)    Location: [facility]            Relevant Problems   Anesthesia   (+) Broken teeth      Cardiovascular   (+) Chest pain   (+) Chronic systolic congestive heart failure (CMS/HCC)   (+) Congestive heart failure (CMS/HCC)   (+) Essential hypertension   (+) Hyperlipidemia      GI   (+) Crohn's disease (CMS/HCC)      Pulmonary   (+) Asthma   (+) Chronic obstructive pulmonary disease (CMS/HCC)   (+) CHARLENE on CPAP   (+) Pneumonia      Infectious Disease   (+) COVID-19 virus infection       Clinical information reviewed:   Tobacco  Allergies  Meds   Med Hx  Surg Hx   Fam Hx  Soc Hx        NPO/Void Status  Carbonhydrate Drink Given Prior to Surgery? : N  Date of Last Liquid: 12/06/23  Time of Last Liquid: 0000  Date of Last Solid: 12/05/23  Time of Last Solid: 2200  Last Intake Type: Clear fluids  Time of Last Void: 1301           Past Medical History:   Diagnosis Date    CHF (congestive heart failure) (CMS/HCC)     COPD (chronic obstructive pulmonary disease) (CMS/HCC)     Crohn's disease (CMS/HCC)     Diabetes mellitus (CMS/HCC)     Gastro-esophageal reflux disease without esophagitis     HTN (hypertension)     On home oxygen therapy     Opioid use, unspecified, uncomplicated     Heroin use, on methadone therapy    CHARLENE (obstructive sleep apnea)     Pancreatic cyst     Personal history of transient ischemic attack (TIA), and cerebral infarction without residual deficits     History of stroke      Past Surgical History:   Procedure Laterality Date    COLONOSCOPY      ESOPHAGOGASTRODUODENOSCOPY      KNEE ARTHROSCOPY W/ MENISCAL REPAIR       Social History     Tobacco Use    Smoking status: Former     Packs/day: 1.00     Years: 20.00     Additional  pack years: 0.00     Total pack years: 20.00     Types: Cigarettes     Quit date:      Years since quittin.9     Passive exposure: Never    Smokeless tobacco: Never   Vaping Use    Vaping Use: Never used   Substance Use Topics    Alcohol use: Not Currently     Comment: no drinking for 1 month now    Drug use: Not Currently     Types: Heroin     Comment: snort heroin but I am in the methadone program x 10 years      Current Outpatient Medications   Medication Instructions    albuterol (Ventolin HFA) 90 mcg/actuation inhaler inhalation    albuterol 0.63 mg, nebulization, 4 times daily PRN    aspirin 81 mg, oral, Daily RT    dapagliflozin (Farxiga) 10 mg 1 tablet, oral, Daily    Entresto  mg tablet 1 tablet, oral, 2 times daily    ferrous sulfate 325 (65 Fe) MG tablet 1 tablet, oral, Daily    fluticasone propion-salmeteroL (Advair Diskus) 250-50 mcg/dose diskus inhaler 1 puff, inhalation, 2 times daily, Rinse mouth with water after use to reduce aftertaste and incidence of candidiasis. Do not swallow.    gabapentin (Neurontin) 600 mg tablet 1 tablet, oral, Take 600 mg in the morning, 600 mg midday, and 1200 mg at bedtime    ipratropium/albuterol sulfate (COMBIVENT RESPIMAT INHL) 2 puffs, inhalation    levocetirizine (Xyzal) 5 mg tablet oral, Daily RT    mesalamine (LIALDA) 1.2 g, oral, 2 times daily, Do not crush, chew, or split.    methadone HCl (METHADONE ORAL) 60 mL, oral, Daily, Pt states last dose was saturday    metoprolol succinate XL (Toprol-XL) 200 mg 24 hr tablet 1 tablet, oral, Daily    nitroglycerin (Nitrostat) 0.4 mg SL tablet sublingual, Dissolve 1 tab under the tongue as needed for chest pain every 5 minutes up to 3 times if no relief call 911    pantoprazole (PROTONIX) 40 mg, oral, 2 times daily    pravastatin (Pravachol) 40 mg tablet 1 tablet, oral, Nightly    spironolactone (ALDACTONE) 25 mg, oral, Daily    tamsulosin (FLOMAX) 0.4 mg, oral, Daily    tiotropium (Spiriva Respimat) 2.5  mcg/actuation inhaler 2 puffs, inhalation, Daily      Allergies   Allergen Reactions    Shellfish Containing Products Anaphylaxis    Ibuprofen Hives        Chemistry    Lab Results   Component Value Date/Time     08/08/2023 1007    K 4.2 08/08/2023 1007     08/08/2023 1007    CO2 30 08/08/2023 1007    BUN 16 08/08/2023 1007    CREATININE 1.03 08/08/2023 1007    Lab Results   Component Value Date/Time    CALCIUM 9.2 08/08/2023 1007    ALKPHOS 57 08/08/2023 1007    AST 16 08/08/2023 1007    ALT 14 08/08/2023 1007    BILITOT 1.0 08/08/2023 1007          Lab Results   Component Value Date/Time    WBC 4.4 08/08/2023 1007    HGB 14.9 08/08/2023 1007    HCT 49.1 08/08/2023 1007     08/08/2023 1007     Lab Results   Component Value Date/Time    PROTIME 14.1 (H) 12/03/2021 0509    INR 1.2 (H) 12/03/2021 0509     No results found for this or any previous visit (from the past 4464 hour(s)).  No results found for this or any previous visit from the past 1095 days.    Echo  8/4/2023:  Left Ventricle: The left ventricular systolic function is mildly decreased, with an estimated ejection fraction of 40-45%. The calculated ejection fraction is mildly decreased at 42 % using the Schumacher's Bi-plane MOD calculation. There is global hypokinesis of the left ventricle with minor regional variations. The left ventricular cavity size is upper limits of normal. The left ventricular septal wall thickness is normal. There is normal left ventricular posterior wall thickness. Spectral Doppler shows a pseudonormal pattern of left ventricular diastolic filling.  Left Atrium: The left atrium is upper limits of normal in size.  Right Ventricle: The right ventricle is normal in size. There is normal right ventricular global systolic function.  Right Atrium: The right atrium is upper limits of normal in size.  Aortic Valve: The aortic valve is trileaflet. The aortic valve appears tricuspid and non-restricted. There is no evidence  "of aortic valve regurgitation. The peak instantaneous gradient of the aortic valve is 3.5 mmHg.  Mitral Valve: The mitral valve is mildly thickened. There is mild mitral valve regurgitation which is laterally directed.  Tricuspid Valve: The tricuspid valve is structurally normal. There is trace to mild tricuspid regurgitation. The tricuspid valve regurgitant jet flows toward the atrial septum. The right ventricular systolic pressure is unable to be estimated.  Pulmonic Valve: The pulmonic valve is not well visualized. The pulmonic valve regurgitation was not well visualized.  Pericardium: There is no pericardial effusion noted.  Aorta: The aortic root is normal.  Systemic Veins: The inferior vena cava appears mildly dilated. There is IVC inspiratory collapse greater than 50%.  In comparison to the previous echocardiogram(s): Compared with study from 12/3/2021,. The left ventricular function is unchanged. The left ventricular hypertrophy is unchanged. The left ventricular diastolic function is unchanged.        CONCLUSIONS:  1. Left ventricular systolic function is mildly decreased with a 40-45% estimated ejection fraction.  2. Spectral Doppler shows a pseudonormal pattern of left ventricular diastolic filling.  3. There is global hypokinesis of the left ventricle with minor regional variations.    Nuclear stress 4/9/2021:  IMPRESSION:  1.  No definite evidence of ischemia or prior infarction.  2. The left ventricle is moderately dilated.  3. Moderately reduced LVEF estimated at 38-39%.    Visit Vitals  /82   Pulse 74   Temp 36.1 °C (97 °F) (Temporal)   Resp 18   Ht 1.803 m (5' 11\")   Wt 87 kg (191 lb 12.8 oz)   SpO2 96%   BMI 26.75 kg/m²   Smoking Status Former   BSA 2.09 m²        Physical Exam    Airway  Mallampati: III  TM distance: >3 FB  Neck ROM: full     Cardiovascular   Rhythm: regular  Rate: normal     Dental - normal exam     Pulmonary   Breath sounds clear to auscultation     Abdominal - normal " exam              Anesthesia Plan    ASA 4     MAC     intravenous induction   Anesthetic plan and risks discussed with patient.

## 2023-12-06 NOTE — H&P
"History Of Present Illness  Arnaldo Francisco is a 63 y.o. male presenting for EUS.  Evaluation of pancreatic cyst with mild PD dilation.     Past Medical History  Past Medical History:   Diagnosis Date    CHF (congestive heart failure) (CMS/HCC)     COPD (chronic obstructive pulmonary disease) (CMS/HCC)     Crohn's disease (CMS/HCC)     Diabetes mellitus (CMS/HCC)     Gastro-esophageal reflux disease without esophagitis     HTN (hypertension)     On home oxygen therapy     Opioid use, unspecified, uncomplicated     Heroin use, on methadone therapy    CHARLENE (obstructive sleep apnea)     Pancreatic cyst     Personal history of transient ischemic attack (TIA), and cerebral infarction without residual deficits     History of stroke       Surgical History  Past Surgical History:   Procedure Laterality Date    COLONOSCOPY      ESOPHAGOGASTRODUODENOSCOPY      KNEE ARTHROSCOPY W/ MENISCAL REPAIR          Social History  He reports that he quit smoking about 20 years ago. His smoking use included cigarettes. He has a 20.00 pack-year smoking history. He has never been exposed to tobacco smoke. He has never used smokeless tobacco. He reports that he does not currently use alcohol. He reports that he does not currently use drugs after having used the following drugs: Heroin.    Family History  Family History   Problem Relation Name Age of Onset    Hypertension Mother      Heart disease Mother      Heart disease Brother      Hypertension Sibling          Allergies  Shellfish containing products and Ibuprofen    Review of Systems     Physical Exam     Last Recorded Vitals  Blood pressure 174/82, pulse 74, temperature 36.1 °C (97 °F), temperature source Temporal, resp. rate 18, height 1.803 m (5' 11\"), weight 87 kg (191 lb 12.8 oz), SpO2 96 %.    Relevant Results     Assessment/Plan   Proceed with EUS.        I spent 5 minutes in the professional and overall care of this patient.      Darcy Pettit MD    "

## 2023-12-06 NOTE — DISCHARGE INSTRUCTIONS
Patient Instructions after an endoscopy      The anesthetics, sedatives or narcotics which were given to you today will be acting in your body for the next 24 hours, so you might feel a little sleepy or groggy.  This feeling should slowly wear off. Carefully read and follow the instructions.     You received sedation today:  - Do not drive or operate any machinery or power tools of any kind.   - No alcoholic beverages today, not even beer or wine.  - Do not make any important decisions or sign any legal documents.  - No over the counter medications that contain alcohol or that may cause drowsiness.  - Do not make any important decisions or sign any legal documents.    While it is common to experience mild to moderate abdominal distention, gas, or belching after your procedure, if any of these symptoms occur following discharge from the GI Lab or within one week of having your procedure, call the [facility] to be advised whether a visit to your nearest Urgent Care or Emergency Department is indicated.  Take this paper with you if you go.     - If you develop an allergic reaction to the medications that were given during your procedure such as difficulty breathing, rash, hives, severe nausea, vomiting or lightheadedness.- If you experience chest pain, shortness of breath, severe abdominal pain, fevers and chills.    -If you develop signs and symptoms of bleeding such as blood in your spit, if your stools turn black, tarry, or bloody    - If you have not urinated within 8 hours following your procedure.- If your IV site becomes painful, red, inflamed, or looks infected.

## 2023-12-07 ENCOUNTER — APPOINTMENT (OUTPATIENT)
Dept: DERMATOLOGY | Facility: CLINIC | Age: 63
End: 2023-12-07
Payer: MEDICAID

## 2023-12-07 ASSESSMENT — PAIN SCALES - GENERAL: PAINLEVEL_OUTOF10: 0 - NO PAIN

## 2023-12-07 NOTE — ANESTHESIA POSTPROCEDURE EVALUATION
Patient: Arnaldo Francisco    Procedure Summary       Date: 12/06/23 Room / Location: [facility]    Anesthesia Start: 1314 Anesthesia Stop: 1349    Procedure: ENDOSCOPIC ULTRASOUND (UPPER) Diagnosis:       Pancreatic cyst      Pancreatic ductal abnormality      Pancreatic cyst    Scheduled Providers: Darcy Pettit MD; Donta Dutta MD; DALTON Blackburn; Ramila Yeboah RN; Cheri Marsh MA Responsible Provider: Donta Dutta MD    Anesthesia Type: MAC ASA Status: 4            Anesthesia Type: MAC    Vitals Value Taken Time   /91 12/06/23 1432   Temp 36.3 °C (97.3 °F) 12/06/23 1430   Pulse 83 12/06/23 1436   Resp 15 12/06/23 1415   SpO2 92 % 12/06/23 1436   Vitals shown include unvalidated device data.    Anesthesia Post Evaluation    Patient location during evaluation: PACU  Patient participation: complete - patient participated  Level of consciousness: awake and alert  Pain management: adequate  Airway patency: patent  Cardiovascular status: acceptable and hemodynamically stable  Respiratory status: acceptable, spontaneous ventilation and nonlabored ventilation  Hydration status: acceptable  Postoperative Nausea and Vomiting: none        There were no known notable events for this encounter.

## 2023-12-08 LAB
LABORATORY COMMENT REPORT: NORMAL
LABORATORY COMMENT REPORT: NORMAL
PATH REPORT.FINAL DX SPEC: NORMAL
PATH REPORT.GROSS SPEC: NORMAL
PATH REPORT.TOTAL CANCER: NORMAL

## 2023-12-12 ENCOUNTER — OFFICE VISIT (OUTPATIENT)
Dept: OPHTHALMOLOGY | Facility: CLINIC | Age: 63
End: 2023-12-12
Payer: MEDICAID

## 2023-12-12 ENCOUNTER — TELEPHONE (OUTPATIENT)
Dept: SURGICAL ONCOLOGY | Facility: CLINIC | Age: 63
End: 2023-12-12

## 2023-12-12 DIAGNOSIS — H25.813 COMBINED FORMS OF AGE-RELATED CATARACT OF BOTH EYES: Primary | ICD-10-CM

## 2023-12-12 DIAGNOSIS — H52.03 HYPEROPIA WITH PRESBYOPIA OF BOTH EYES: ICD-10-CM

## 2023-12-12 DIAGNOSIS — H52.4 HYPEROPIA WITH PRESBYOPIA OF BOTH EYES: ICD-10-CM

## 2023-12-12 PROCEDURE — 92015 DETERMINE REFRACTIVE STATE: CPT | Performed by: STUDENT IN AN ORGANIZED HEALTH CARE EDUCATION/TRAINING PROGRAM

## 2023-12-12 PROCEDURE — 99203 OFFICE O/P NEW LOW 30 MIN: CPT | Performed by: STUDENT IN AN ORGANIZED HEALTH CARE EDUCATION/TRAINING PROGRAM

## 2023-12-12 ASSESSMENT — REFRACTION_MANIFEST
OD_CYLINDER: SPHERE
OD_CYLINDER: +0.25
OS_CYLINDER: +0.75
OS_SPHERE: +1.25
OD_ADD: +2.50
OS_AXIS: 004
OD_SPHERE: +0.25
OS_SPHERE: PLANO
OS_AXIS: 005
OD_AXIS: 032
OS_CYLINDER: +0.50
OS_ADD: +2.50
OD_SPHERE: +0.25

## 2023-12-12 ASSESSMENT — CONF VISUAL FIELD
OD_NORMAL: 1
OD_SUPERIOR_TEMPORAL_RESTRICTION: 0
METHOD: COUNTING FINGERS
OS_SUPERIOR_TEMPORAL_RESTRICTION: 0
OD_SUPERIOR_NASAL_RESTRICTION: 0
OD_INFERIOR_NASAL_RESTRICTION: 0
OS_SUPERIOR_NASAL_RESTRICTION: 0
OD_INFERIOR_TEMPORAL_RESTRICTION: 0
OS_INFERIOR_NASAL_RESTRICTION: 0
OS_NORMAL: 1
OS_INFERIOR_TEMPORAL_RESTRICTION: 0

## 2023-12-12 ASSESSMENT — TONOMETRY
OS_IOP_MMHG: 18
IOP_METHOD: GOLDMANN APPLANATION
OD_IOP_MMHG: 18

## 2023-12-12 ASSESSMENT — CUP TO DISC RATIO
OS_RATIO: .35
OD_RATIO: .35

## 2023-12-12 ASSESSMENT — VISUAL ACUITY
OD_SC: 20/20
OS_SC+: -2
OD_SC+: -2
OS_SC: 20/25
METHOD: SNELLEN - LINEAR

## 2023-12-12 ASSESSMENT — ENCOUNTER SYMPTOMS
CARDIOVASCULAR NEGATIVE: 0
ALLERGIC/IMMUNOLOGIC NEGATIVE: 0
ENDOCRINE NEGATIVE: 0
HEMATOLOGIC/LYMPHATIC NEGATIVE: 0
NEUROLOGICAL NEGATIVE: 0
MUSCULOSKELETAL NEGATIVE: 0
GASTROINTESTINAL NEGATIVE: 0
RESPIRATORY NEGATIVE: 0
CONSTITUTIONAL NEGATIVE: 0
EYES NEGATIVE: 0
PSYCHIATRIC NEGATIVE: 0

## 2023-12-12 ASSESSMENT — EXTERNAL EXAM - LEFT EYE: OS_EXAM: NORMAL

## 2023-12-12 ASSESSMENT — SLIT LAMP EXAM - LIDS
COMMENTS: NORMAL
COMMENTS: NORMAL

## 2023-12-12 ASSESSMENT — EXTERNAL EXAM - RIGHT EYE: OD_EXAM: NORMAL

## 2023-12-12 NOTE — TELEPHONE ENCOUNTER
Called and spoke with Arnaldo's wife, Cyn.    He underwent EUS-FNA with Dr. Darcy Pettit. The CBD was dilated to 10 mm without stricture or choledocholithiasis.     The main pancreatic duct was dilated in the HoP up to 6 mm. The ampulla was normal without evidence of main duct involvement.     His known pancreatic cyst was without high risk features. Cytology was negative for malignant cells. Fluid studies are consistent with a mucinous cyst, as known.     No evidence of mass or malignancy. I provided reassurance. Will continue with annual surveillance with MRCP.     Amanda Edwards PA-C  Surgical Oncology

## 2023-12-12 NOTE — PROGRESS NOTES
Assessment/Plan   Diagnoses and all orders for this visit:  Combined forms of age-related cataract of both eyes  -mild non visually significant at this time. Pt ed. Contributing to BCVA 20/25 OS.  Hyperopia with presbyopia of both eyes  -New spec rx released today per patient request. Ocular health wnl for age OU. Monitor 1 year or sooner prn. Refraction billed today.  -Discussed FTW PAL or Bifocal vs. OTC NVO specs    RTC 1 year for annual with DFE and MRX

## 2023-12-13 LAB
LAB AP ASR DISCLAIMER: NORMAL
LABORATORY COMMENT REPORT: NORMAL
PATH REPORT.FINAL DX SPEC: NORMAL
PATH REPORT.GROSS SPEC: NORMAL
PATH REPORT.TOTAL CANCER: NORMAL

## 2023-12-18 DIAGNOSIS — I25.118 CORONARY ARTERY DISEASE INVOLVING NATIVE HEART WITH OTHER FORM OF ANGINA PECTORIS, UNSPECIFIED VESSEL OR LESION TYPE (CMS-HCC): Primary | ICD-10-CM

## 2023-12-19 RX ORDER — METOPROLOL SUCCINATE 200 MG/1
200 TABLET, EXTENDED RELEASE ORAL DAILY
Qty: 90 TABLET | Refills: 3 | Status: SHIPPED | OUTPATIENT
Start: 2023-12-19

## 2023-12-22 ENCOUNTER — TELEPHONE (OUTPATIENT)
Dept: GASTROENTEROLOGY | Facility: HOSPITAL | Age: 63
End: 2023-12-22
Payer: MEDICAID

## 2023-12-22 DIAGNOSIS — B96.81 HELICOBACTER PYLORI GASTRITIS: ICD-10-CM

## 2023-12-22 DIAGNOSIS — K29.70 HELICOBACTER PYLORI GASTRITIS: ICD-10-CM

## 2023-12-22 RX ORDER — PANTOPRAZOLE SODIUM 40 MG/1
40 TABLET, DELAYED RELEASE ORAL 2 TIMES DAILY
Qty: 60 TABLET | Refills: 0 | Status: SHIPPED | OUTPATIENT
Start: 2023-12-22 | End: 2024-02-19 | Stop reason: SDUPTHER

## 2023-12-28 LAB
ELECTRONICALLY SIGNED BY: NORMAL
H. PYLORI DRUG SUSCEPTIBILITY RESULTS: NORMAL

## 2024-01-02 ENCOUNTER — TELEPHONE (OUTPATIENT)
Dept: GASTROENTEROLOGY | Facility: HOSPITAL | Age: 64
End: 2024-01-02
Payer: MEDICAID

## 2024-01-02 DIAGNOSIS — K29.70 HELICOBACTER POSITIVE GASTRITIS: Primary | ICD-10-CM

## 2024-01-02 DIAGNOSIS — B96.81 HELICOBACTER POSITIVE GASTRITIS: Primary | ICD-10-CM

## 2024-01-02 NOTE — TELEPHONE ENCOUNTER
Pt was called- I spoke to his wife and explained that gastric biopsies showed recurrent/persistent HP gastritis.    Recall- he has had persistent HP and is s/p bismuth quadruple therapy w/ UBT positive test f/b rifabutin regimen f/b repeat EGD w/ positive HP on gastric biopsies.     The Ab susceptibility testing showed that we should use bismuth quadruple or clarithromycin triple regimens both of which have been used in the past.    As such, at this point, I will refer him to ID for persistent HP gastritis s/p bismuth quadruple therapy and rifabutin regimens, to evaluate for next best Ab therapy in this case.    Wife understood the above and will call to make appointment today, she did not have any further questions. Risks of untreated HP gastritis inlcuding PUD and increased risk of gastric cancer in the long run were explained in detail to pt's wife.

## 2024-01-05 ENCOUNTER — PROCEDURE VISIT (OUTPATIENT)
Dept: GASTROENTEROLOGY | Facility: HOSPITAL | Age: 64
End: 2024-01-05
Payer: MEDICAID

## 2024-01-05 VITALS
OXYGEN SATURATION: 94 % | SYSTOLIC BLOOD PRESSURE: 124 MMHG | DIASTOLIC BLOOD PRESSURE: 87 MMHG | TEMPERATURE: 97.5 F | BODY MASS INDEX: 27.3 KG/M2 | RESPIRATION RATE: 18 BRPM | HEART RATE: 81 BPM | WEIGHT: 195 LBS | HEIGHT: 71 IN

## 2024-01-05 DIAGNOSIS — B96.81 GASTRITIS, HELICOBACTER PYLORI: ICD-10-CM

## 2024-01-05 DIAGNOSIS — K29.70 GASTRITIS, HELICOBACTER PYLORI: ICD-10-CM

## 2024-01-05 PROCEDURE — 91065 BREATH HYDROGEN/METHANE TEST: CPT | Performed by: STUDENT IN AN ORGANIZED HEALTH CARE EDUCATION/TRAINING PROGRAM

## 2024-01-05 ASSESSMENT — PAIN SCALES - GENERAL: PAINLEVEL: 0-NO PAIN

## 2024-01-05 NOTE — PROGRESS NOTES
Patient ID: Arnaldo Francisco is a 63 y.o. male.    Breath Hydrogen Test-Dextrose    Date/Time: 1/5/2024 8:47 AM    Performed by: Dirk Sandy MA  Authorized by: Lexie Zelaya MD    Consent:     Consent obtained:  Verbal    Consent given by:  Patient    Risks, benefits, and alternatives were discussed: yes    Universal protocol:     Procedure explained and questions answered to patient or proxy's satisfaction: yes      Relevant documents present and verified: yes      Test results available: no      Imaging studies available: no      Required blood products, implants, devices, and special equipment available: no      Site/side marked: no      Immediately prior to procedure, a time out was called: yes      Patient identity confirmed:  Verbally with patient  Sedation:     Sedation type:  None  Anesthesia:     Anesthesia method:  None  Post-procedure details:     Procedure completion:  Tolerated      Hydrogen Breath Analysis Consultation Sheet    Referring Provider  Lexie Zelaya Md  [id number] [facility]  Department Of Medicine-gastroenterology  [place] [id number]    Indication: [unfilled]    Symptoms: Abdominal Bloating    Age: 63 y.o.  Weight:   Vitals:    01/05/24 0813   BP: 124/87   Pulse: 81   Resp: 18   Temp: 36.4 °C (97.5 °F)   SpO2: 94%      Substrate: Dextrose   Dose: 75 grams    Last Meal: Two boiled eggs  Recent Antibiotics: No     RESULTS:   Time PPM (H2) APPM* (CH4) CO2 Correction   Baseline #1 0825 6 8 5.0 1.10   Baseline #2 0840 5 7 1.00 1.00   *Challenge Dose Sugar given:@ 0842  *Challenge Dose Completed: @ 0845  15' 0900 3 5 5.2 1.05   30' 0915 3 6 6.0 0.92   45' 0930 3 6 5.8 0.95   60' 0945 2 6 5.0 1.10   75' 1000 2 4 4.9 1.12   90' 1015 2 4 5.4 1.01   105' 1030 2 3 5.2 1.05   120' 1045 1 4 5.1 1.07   135' 1100 1 4 5.1 1.07   150' 1115 2 6 4.6 1.19   165' 1130 2 5 4.3 1.15   180' 1145 1 7 5.2 1.03      Impression:   Negative hydrogen breath test for small intestinal bacterial overgrowth.    Procedure  Location  [facility]  [id number]  [facility] [address]  [phone]     Dejuan Fry MD

## 2024-01-09 ENCOUNTER — DOCUMENTATION (OUTPATIENT)
Dept: GASTROENTEROLOGY | Facility: HOSPITAL | Age: 64
End: 2024-01-09
Payer: MEDICAID

## 2024-01-18 DIAGNOSIS — J43.9 PULMONARY EMPHYSEMA, UNSPECIFIED EMPHYSEMA TYPE (MULTI): ICD-10-CM

## 2024-01-18 RX ORDER — FLUTICASONE PROPIONATE AND SALMETEROL 50; 250 UG/1; UG/1
POWDER RESPIRATORY (INHALATION)
Qty: 60 EACH | Refills: 1 | Status: SHIPPED | OUTPATIENT
Start: 2024-01-18

## 2024-01-18 RX ORDER — TIOTROPIUM BROMIDE INHALATION SPRAY 3.12 UG/1
2 SPRAY, METERED RESPIRATORY (INHALATION) DAILY
Qty: 4 G | Refills: 1 | Status: SHIPPED | OUTPATIENT
Start: 2024-01-18 | End: 2024-03-18

## 2024-01-19 ENCOUNTER — TELEPHONE (OUTPATIENT)
Dept: PRIMARY CARE | Facility: CLINIC | Age: 64
End: 2024-01-19

## 2024-01-19 ENCOUNTER — TELEPHONE (OUTPATIENT)
Dept: PRIMARY CARE | Facility: CLINIC | Age: 64
End: 2024-01-19
Payer: MEDICAID

## 2024-01-19 DIAGNOSIS — R26.2 DIFFICULTY WALKING: ICD-10-CM

## 2024-02-01 ENCOUNTER — LAB (OUTPATIENT)
Dept: LAB | Facility: LAB | Age: 64
End: 2024-02-01
Payer: MEDICAID

## 2024-02-01 ENCOUNTER — OFFICE VISIT (OUTPATIENT)
Dept: PULMONOLOGY | Facility: HOSPITAL | Age: 64
End: 2024-02-01
Payer: MEDICAID

## 2024-02-01 VITALS
WEIGHT: 192.7 LBS | SYSTOLIC BLOOD PRESSURE: 137 MMHG | DIASTOLIC BLOOD PRESSURE: 93 MMHG | OXYGEN SATURATION: 95 % | HEART RATE: 88 BPM | BODY MASS INDEX: 26.88 KG/M2 | TEMPERATURE: 97.2 F

## 2024-02-01 DIAGNOSIS — J44.89 ASTHMA-COPD OVERLAP SYNDROME (MULTI): Primary | ICD-10-CM

## 2024-02-01 DIAGNOSIS — J44.89 ASTHMA-COPD OVERLAP SYNDROME (MULTI): ICD-10-CM

## 2024-02-01 DIAGNOSIS — J43.9 PULMONARY EMPHYSEMA, UNSPECIFIED EMPHYSEMA TYPE (MULTI): ICD-10-CM

## 2024-02-01 DIAGNOSIS — G47.34 NOCTURNAL HYPOXIA: ICD-10-CM

## 2024-02-01 DIAGNOSIS — J96.11 CHRONIC HYPOXIC RESPIRATORY FAILURE (MULTI): ICD-10-CM

## 2024-02-01 DIAGNOSIS — G47.33 OSA ON CPAP: ICD-10-CM

## 2024-02-01 LAB
BASOPHILS # BLD AUTO: 0.04 X10*3/UL (ref 0–0.1)
BASOPHILS NFR BLD AUTO: 0.7 %
EOSINOPHIL # BLD AUTO: 0.34 X10*3/UL (ref 0–0.7)
EOSINOPHIL NFR BLD AUTO: 6.1 %
ERYTHROCYTE [DISTWIDTH] IN BLOOD BY AUTOMATED COUNT: 13.9 % (ref 11.5–14.5)
HCT VFR BLD AUTO: 50.8 % (ref 41–52)
HGB BLD-MCNC: 15.8 G/DL (ref 13.5–17.5)
IMM GRANULOCYTES # BLD AUTO: 0.02 X10*3/UL (ref 0–0.7)
IMM GRANULOCYTES NFR BLD AUTO: 0.4 % (ref 0–0.9)
LYMPHOCYTES # BLD AUTO: 2.11 X10*3/UL (ref 1.2–4.8)
LYMPHOCYTES NFR BLD AUTO: 37.6 %
MCH RBC QN AUTO: 27.2 PG (ref 26–34)
MCHC RBC AUTO-ENTMCNC: 31.1 G/DL (ref 32–36)
MCV RBC AUTO: 88 FL (ref 80–100)
MONOCYTES # BLD AUTO: 0.5 X10*3/UL (ref 0.1–1)
MONOCYTES NFR BLD AUTO: 8.9 %
NEUTROPHILS # BLD AUTO: 2.6 X10*3/UL (ref 1.2–7.7)
NEUTROPHILS NFR BLD AUTO: 46.3 %
NRBC BLD-RTO: 0 /100 WBCS (ref 0–0)
PLATELET # BLD AUTO: 219 X10*3/UL (ref 150–450)
RBC # BLD AUTO: 5.8 X10*6/UL (ref 4.5–5.9)
WBC # BLD AUTO: 5.6 X10*3/UL (ref 4.4–11.3)

## 2024-02-01 PROCEDURE — 86003 ALLG SPEC IGE CRUDE XTRC EA: CPT

## 2024-02-01 PROCEDURE — 3075F SYST BP GE 130 - 139MM HG: CPT | Performed by: NURSE PRACTITIONER

## 2024-02-01 PROCEDURE — 36415 COLL VENOUS BLD VENIPUNCTURE: CPT

## 2024-02-01 PROCEDURE — 3080F DIAST BP >= 90 MM HG: CPT | Performed by: NURSE PRACTITIONER

## 2024-02-01 PROCEDURE — 1036F TOBACCO NON-USER: CPT | Performed by: NURSE PRACTITIONER

## 2024-02-01 PROCEDURE — 82785 ASSAY OF IGE: CPT

## 2024-02-01 PROCEDURE — 99215 OFFICE O/P EST HI 40 MIN: CPT | Performed by: NURSE PRACTITIONER

## 2024-02-01 PROCEDURE — 80053 COMPREHEN METABOLIC PANEL: CPT

## 2024-02-01 PROCEDURE — 80061 LIPID PANEL: CPT

## 2024-02-01 PROCEDURE — 85025 COMPLETE CBC W/AUTO DIFF WBC: CPT

## 2024-02-01 RX ORDER — FLUTICASONE PROPIONATE AND SALMETEROL 500; 50 UG/1; UG/1
1 POWDER RESPIRATORY (INHALATION)
Qty: 1 EACH | Refills: 3 | Status: SHIPPED | OUTPATIENT
Start: 2024-02-01

## 2024-02-01 ASSESSMENT — PAIN SCALES - GENERAL: PAINLEVEL: 0-NO PAIN

## 2024-02-01 ASSESSMENT — ENCOUNTER SYMPTOMS
ABDOMINAL PAIN: 0
WEAKNESS: 0
DIZZINESS: 0
VOICE CHANGE: 0
SINUS PRESSURE: 0
RHINORRHEA: 0
PALPITATIONS: 0
DIARRHEA: 0
AGITATION: 0
BACK PAIN: 0
MYALGIAS: 0
NAUSEA: 0
FATIGUE: 0
ARTHRALGIAS: 0
VOMITING: 0
FEVER: 0
JOINT SWELLING: 0
NERVOUS/ANXIOUS: 0
HEADACHES: 0
EYE PAIN: 0
NUMBNESS: 0

## 2024-02-01 ASSESSMENT — PATIENT HEALTH QUESTIONNAIRE - PHQ9
2. FEELING DOWN, DEPRESSED OR HOPELESS: NOT AT ALL
SUM OF ALL RESPONSES TO PHQ9 QUESTIONS 1 & 2: 0
1. LITTLE INTEREST OR PLEASURE IN DOING THINGS: NOT AT ALL

## 2024-02-01 ASSESSMENT — LIFESTYLE VARIABLES: HOW OFTEN DO YOU HAVE A DRINK CONTAINING ALCOHOL: MONTHLY OR LESS

## 2024-02-01 NOTE — PROGRESS NOTES
Patient: Arnaldo Francisco    [id number]  : 1960 -- AGE 63 y.o.    Provider: CLAIR Colby-CNP     Location [facility]   Service Date: 2024              [facility]  Follow up visit note      HISTORY OF PRESENT ILLNESS       HISTORY OF PRESENT ILLNESS     Mr. Francisco is a 63 year old male (former smoker) here for follow up for asthma/COPD. Last seen in clinic on 23.     Since last visit he feels his breathing is doing about the same. He has been using his advair twice daily, Spiriva daily, and albuterol a few times a week. He uses his oxygen at night 3L - he was able to get the humidification I ordered. He will use it here and there during the day.  He denies any cough, wheezing, SOB at rest, CP, or GERD. He has IBARRA with carrying things. He states he was having IBARRA with walking in the yard etc. He denies any allergy symptoms.  He is active in his day to day life, but he knows to check and wear his oxygen if he needs it.     ACT today 19 / CAT 13     23: Mr. Francisco states his breathing has been doing better. He has been using his advair daily, spiriva daily, and albuterol 0-4 x per day. He has oxygen at home - he wears it when he is exerting himself. He has a home portable oxygen concentrator - has not used it a lot. He has been wearing his oxygen at [facility] at 3L. He does not have humidification for his home concentrator. He has a nebulizer at home  and medication to go in it.  He denies any cough, wheezing, SOB at rest, or GERD. He has IBARRA with walking garbage out to the curb - some days he has IBARRA with walking from his bed to the bathroom (not all the time).  He denies allergy symptoms.   ACT today 17 / CAT today 11     Last seen in clinic on 10/9/23 by Dr. Panchal     ALLERGIES AND MEDICATIONS     ALLERGIES  Allergies   Allergen Reactions    Shellfish Containing Products Anaphylaxis    Ibuprofen Hives        MEDICATIONS  Current Outpatient Medications   Medication Sig Dispense Refill    albuterol (Ventolin HFA) 90 mcg/actuation inhaler Inhale.      albuterol 0.63 mg/3 mL nebulizer solution Take 3 mL (0.63 mg) by nebulization 4 times a day as needed.      aspirin 81 mg EC tablet Take 1 tablet (81 mg) by mouth once daily.      dapagliflozin (Farxiga) 10 mg Take 1 tablet (10 mg) by mouth once daily.      Entresto  mg tablet Take 1 tablet by mouth 2 times a day.      ferrous sulfate 325 (65 Fe) MG tablet Take 1 tablet by mouth once daily.      fluticasone propion-salmeteroL (Advair Diskus) 250-50 mcg/dose diskus inhaler USE 1 PUFF BY MOUTH TWICE DAILY. RINSE MOUTH WITH WATER AFTER USE TO REDUCE AFTERTASTE. DO NOT SWALLOW 60 each 1    gabapentin (Neurontin) 600 mg tablet Take 1 tablet (600 mg) by mouth. Take 600 mg in the morning, 600 mg midday, and 1200 mg at bedtime      ipratropium/albuterol sulfate (COMBIVENT RESPIMAT INHL) Inhale 2 puffs.      levocetirizine (Xyzal) 5 mg tablet Take by mouth once daily.      mesalamine (Lialda) 1.2 gram EC tablet Take 1 tablet (1.2 g) by mouth 2 times a day. Do not crush, chew, or split. 60 tablet 11    methadone HCl (METHADONE ORAL) Take 60 mL by mouth once daily. Pt states last dose was saturday      metoprolol succinate XL (Toprol-XL) 200 mg 24 hr tablet TAKE 1 TABLET BY MOUTH ONCE DAILY 90 tablet 3    nitroglycerin (Nitrostat) 0.4 mg SL tablet Place under the tongue. Dissolve 1 tab under the tongue as needed for chest pain every 5 minutes up to 3 times if no relief call 911      pravastatin (Pravachol) 40 mg tablet Take 1 tablet (40 mg) by mouth once daily at bedtime.      spironolactone (Aldactone) 25 mg tablet Take 1 tablet (25 mg) by mouth once daily.      tamsulosin (Flomax) 0.4 mg 24 hr capsule TAKE 1 CAPSULE BY MOUTH ONCE DAILY 90 capsule 10    tiotropium (Spiriva Respimat) 2.5 mcg/actuation inhaler INHALE 2 PUFFS BY MOUTH EVERY DAY 4 g 1    pantoprazole (ProtoNix)  40 mg EC tablet Take 1 tablet (40 mg) by mouth 2 times a day. 60 tablet 0     No current facility-administered medications for this visit.       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   - 12/2020 COVID pneumonia   - TIA     PAST SURGICAL HISTORY  Past Surgical History:   Procedure Laterality Date    COLONOSCOPY      ESOPHAGOGASTRODUODENOSCOPY      KNEE ARTHROSCOPY W/ MENISCAL REPAIR         IMMUNIZATION HISTORY  Immunization History   Administered Date(s) Administered    Flu vaccine (IIV4), preservative free *Check age/dose* 11/25/2014, 10/10/2017, 09/16/2021, 11/04/2022    Flu vaccine, quadrivalent, no egg protein, age 6 month or greater (FLUCELVAX) 10/21/2020    Influenza, Unspecified 11/04/2022    Moderna SARS-CoV-2 Vaccination 03/18/2021    Pfizer COVID-19 vaccine, bivalent, age 5y-11y (10 mcg/0.2 mL) 11/04/2022    Pneumococcal polysaccharide vaccine, 23-valent, age 2 years and older (PNEUMOVAX 23) 11/25/2014, 11/04/2022    Pneumococcal, Unspecified 11/25/2014    SARS-CoV-2, Unspecified 08/03/2022    Td (adult), unspecified 08/13/2014    Tdap vaccine, age 7 year and older (BOOSTRIX, ADACEL) 02/12/2016       SOCIAL HISTORY  Smoking: former - 1ppd 22 years - quit in 2001 ~22 pack years   Alcohol: previously occasionally -- stopped over the last few weeks   Illicit drugs:  heroin - 9-10 years     OCCUPATIONAL/ENVIRONMENTAL HISTORY  Previously worked as / [employer].     FAMILY HISTORY  Brother - asthma   Sister - passed away -- needed ? Double lung transplant, but not able to get it - unsure exact diagnosis     RESULTS/DATA     Pulmonary Function Test Results     10/9/23 - FEV1/ FVC 0.32/ FEV1 1.32 (42% + BD resp)/ FVC 3.15 (78%)/ TLC 7.33 (112%)/ DLCO 57%     O2 desat - 10/9/23 - 93 RA at rest -> 82 RA with exertion, 84 2L at rest -> 88% on 2L with exertion, 98% on 4L at rest -> 93% on 4L with exertion     Chest  Radiograph     XR chest 2 views - 12/7/22 -   Chronic changes with signs of COPD and parenchymal scarring. No acute  infiltrates. Enlarged cardiac silhouette.  Dictation workstation:   [id number]      Chest CT Scan     Previous scans in 2016/2017 8/28/18 -  No sign of pulmonary embolism. 2. Bilateral pulmonary bullous changes with no sign of pneumothorax  or pleural effusion.    4/2/20 -Moderate to advanced emphysema with upper lobe predominance.  Bilateral parenchymal scarring as described. Stable infrahilar left lower lobe partially calcified granuloma. There is biliary ductal dilatation and pancreatic ductal dilatation. Please note that this was a CT scan chest and therefore the pancreas was not completely included. The patient previously had a known nonspecific cystic nodular structure within the pancreatic head inferiorly near the uncinate process. Recommend a dedicated CT of the pancreas or pancreas MRI to evaluate for enlargement of that cystic lesion and to exclude enlarging cystic pancreatic neoplasm.      Echocardiogram     Echocardiogram - 8/4/23 -   1. Left ventricular systolic function is mildly decreased with a 40-45% estimated ejection fraction.  2. Spectral Doppler shows a pseudonormal pattern of left ventricular diastolic filling.  3. There is global hypokinesis of the left ventricle with minor regional variations.  RA is upper limits of normal is size. RV normal size/ RV systolic function       Labs/ Other testing      Sleep study - 7/2017 - mild to moderate CHARLENE     CBC with diff - [facility] - 11/26/22- 430      A1A - 9/9/15 - 148     REVIEW OF SYSTEMS     REVIEW OF SYSTEMS  Review of Systems   Constitutional:  Negative for fatigue and fever.   HENT:  Negative for congestion, postnasal drip, rhinorrhea, sinus pressure and voice change.    Eyes:  Negative for pain and visual disturbance.   Cardiovascular:  Negative for chest pain, palpitations and leg swelling.   Gastrointestinal:  Negative for  abdominal pain, diarrhea, nausea and vomiting.   Endocrine: Negative for cold intolerance and heat intolerance.   Musculoskeletal:  Negative for arthralgias, back pain, joint swelling and myalgias.   Skin:  Negative for rash.   Neurological:  Negative for dizziness, weakness, numbness and headaches.   Psychiatric/Behavioral:  Negative for agitation. The patient is not nervous/anxious.          PHYSICAL EXAM     VITAL SIGNS: BP (!) 137/93 (BP Location: Left arm, Patient Position: Sitting)   Pulse 88   Temp 36.2 °C (97.2 °F) (Temporal)   Wt 87.4 kg (192 lb 11.2 oz)   SpO2 95% Comment: RA  BMI 26.88 kg/m²      CURRENT WEIGHT: [unfilled]  BMI: [unfilled]  PREVIOUS WEIGHTS:  Wt Readings from Last 3 Encounters:   02/01/24 87.4 kg (192 lb 11.2 oz)   01/05/24 88.5 kg (195 lb)   12/06/23 87 kg (191 lb 12.8 oz)       Physical Exam  Vitals reviewed.   Constitutional:       General: He is not in acute distress.     Appearance: Normal appearance. He is not ill-appearing or toxic-appearing.   HENT:      Head: Normocephalic.      Nose: No rhinorrhea.   Cardiovascular:      Rate and Rhythm: Normal rate and regular rhythm.      Heart sounds: Normal heart sounds.   Pulmonary:      Effort: Pulmonary effort is normal. No respiratory distress.      Breath sounds: Normal breath sounds. No stridor.      Comments: Diminished   Abdominal:      General: Abdomen is flat.   Musculoskeletal:         General: Normal range of motion.      Right lower leg: No edema.      Left lower leg: No edema.   Skin:     General: Skin is warm and dry.      Nails: There is no clubbing.   Neurological:      General: No focal deficit present.      Mental Status: He is alert and oriented to person, place, and time.   Psychiatric:         Mood and Affect: Mood normal.         Behavior: Behavior normal.         Judgment: Judgment normal.         ASSESSMENT/PLAN     COPD/Asthma: PFTs with severe obstruction with BD resp. Discussed possible biologic vs endobronchial  valves in the future. Will get blood work. Currently not a candidate for valves given current EF.   - will increase dose of advair to 500/50 - 1 inhalation twice daily - rinse mouth out afterwards -- looks like it may need a PA   - continue spirivia daily   - continue albuterol HFA inhaler 2 puffs or albuterol nebulizer treatment every 4-6 hours as needed for shortness of breath    - increase activity as tolerated - discussed using his treadmill at home. Discussed pulmonary rehab, but more active in his day to day life. Can discuss further in the future.   - will get pernell pre/post with next appt   - will get CBC with diff and RAST   - will get CT chest     2. Chronic hypoxic respiratory failure: O2 titration on 10/9/23 showed he needs 4L with exertion   - continue 3L with sleep and exertion   -  monitor pulse ox - if less than 88% with activity then that level of activity you need to wear your oxygen    3. Mild CHARLENE: last sleep study in [facility]  - make appt with sleep medicine     4. HFrEF: EF 40-45%   - follow up with Dr. Simms

## 2024-02-01 NOTE — PATIENT INSTRUCTIONS
COPD/Asthma: PFTs with severe obstruction with BD resp. Discussed possible biologic vs endobronchial valves in the future. Will get blood work. Currently not a candidate for valves given current EF.   - will increase dose of advair to 500/50 - 1 inhalation twice daily - rinse mouth out afterwards -- looks like it may need a PA   - continue spirivia daily   - continue albuterol HFA inhaler 2 puffs or albuterol nebulizer treatment every 4-6 hours as needed for shortness of breath    - increase activity as tolerated - discussed using his treadmill at home   - will get pernell pre/post with next appt   - will get CBC with diff and RAST     2. Chronic hypoxic respiratory failure: O2 titration on 10/9/23 showed he needs 4L with exertion   - continue 3L with sleep and exertion   -  monitor pulse ox - if less than 88% with activity then that level of activity you need to wear your oxygen    3. Mild CHARLENE: last sleep study in [facility]  - make appt with sleep medicine     Thank you for visiting the Pulmonary clinic today!   Return to clinic 2 months with PFTs  or sooner if needed   Janette Bermeo CNP  My office -  [phone]- Mayte is my  and Denise is my nurse.   Radiology scheduling [phone]   Appointment scheduling [phone]   Pulmonary function testing - [phone]

## 2024-02-02 LAB
A ALTERNATA IGE QN: <0.1 KU/L
A FUMIGATUS IGE QN: <0.1 KU/L
BERMUDA GRASS IGE QN: <0.1 KU/L
BOXELDER IGE QN: <0.1 KU/L
C HERBARUM IGE QN: <0.1 KU/L
CALIF WALNUT POLN IGE QN: <0.1 KU/L
CAT DANDER IGE QN: <0.1 KU/L
CMN PIGWEED IGE QN: <0.1 KU/L
COMMON RAGWEED IGE QN: 1.21 KU/L
COTTONWOOD IGE QN: <0.1 KU/L
D FARINAE IGE QN: 12.3 KU/L
D PTERONYSS IGE QN: 7.32 KU/L
DOG DANDER IGE QN: 0.15 KU/L
ENGL PLANTAIN IGE QN: <0.1 KU/L
GOOSEFOOT IGE QN: <0.1 KU/L
JOHNSON GRASS IGE QN: <0.1 KU/L
KENT BLUE GRASS IGE QN: <0.1 KU/L
LONDON PLANE IGE QN: <0.1 KU/L
MT JUNIPER IGE QN: 0.91 KU/L
P NOTATUM IGE QN: <0.1 KU/L
PECAN/HICK TREE IGE QN: <0.1 KU/L
ROACH IGE QN: 13.8 KU/L
SALTWORT IGE QN: 0.4 KU/L
SHEEP SORREL IGE QN: 0.2 KU/L
SILVER BIRCH IGE QN: <0.1 KU/L
TIMOTHY IGE QN: 0.32 KU/L
TOTAL IGE SMQN RAST: 160 KU/L
WHITE ASH IGE QN: <0.1 KU/L
WHITE ELM IGE QN: <0.1 KU/L
WHITE MULBERRY IGE QN: <0.1 KU/L
WHITE OAK IGE QN: <0.1 KU/L

## 2024-02-05 ENCOUNTER — TELEPHONE (OUTPATIENT)
Dept: GASTROENTEROLOGY | Facility: HOSPITAL | Age: 64
End: 2024-02-05
Payer: MEDICAID

## 2024-02-07 DIAGNOSIS — R10.84 GENERALIZED ABDOMINAL PAIN: Primary | ICD-10-CM

## 2024-02-08 ENCOUNTER — LAB (OUTPATIENT)
Dept: LAB | Facility: LAB | Age: 64
End: 2024-02-08
Payer: MEDICAID

## 2024-02-08 DIAGNOSIS — R10.84 GENERALIZED ABDOMINAL PAIN: ICD-10-CM

## 2024-02-08 LAB
ALBUMIN SERPL BCP-MCNC: 3.7 G/DL (ref 3.4–5)
ALP SERPL-CCNC: 64 U/L (ref 33–136)
ALT SERPL W P-5'-P-CCNC: 8 U/L (ref 10–52)
AST SERPL W P-5'-P-CCNC: 11 U/L (ref 9–39)
BILIRUB DIRECT SERPL-MCNC: 0.3 MG/DL (ref 0–0.3)
BILIRUB SERPL-MCNC: 1.1 MG/DL (ref 0–1.2)
PROT SERPL-MCNC: 7 G/DL (ref 6.4–8.2)

## 2024-02-08 PROCEDURE — 36415 COLL VENOUS BLD VENIPUNCTURE: CPT

## 2024-02-08 PROCEDURE — 80076 HEPATIC FUNCTION PANEL: CPT

## 2024-02-13 ENCOUNTER — HOSPITAL ENCOUNTER (OUTPATIENT)
Dept: RESPIRATORY THERAPY | Facility: HOSPITAL | Age: 64
Discharge: HOME | End: 2024-02-13
Payer: MEDICAID

## 2024-02-13 ENCOUNTER — TELEPHONE (OUTPATIENT)
Dept: GASTROENTEROLOGY | Facility: HOSPITAL | Age: 64
End: 2024-02-13

## 2024-02-13 DIAGNOSIS — J44.89 ASTHMA-COPD OVERLAP SYNDROME (MULTI): ICD-10-CM

## 2024-02-13 LAB
MGC ASCENT PFT - FEV1 - POST: 1.53
MGC ASCENT PFT - FEV1 - PRE: 1.47
MGC ASCENT PFT - FEV1 - PREDICTED: 3.42
MGC ASCENT PFT - FVC - POST: 3.55
MGC ASCENT PFT - FVC - PRE: 3.46
MGC ASCENT PFT - FVC - PREDICTED: 4.44

## 2024-02-13 PROCEDURE — 95012 NITRIC OXIDE EXP GAS DETER: CPT | Performed by: INTERNAL MEDICINE

## 2024-02-13 PROCEDURE — 95012 NITRIC OXIDE EXP GAS DETER: CPT

## 2024-02-13 PROCEDURE — 94060 EVALUATION OF WHEEZING: CPT | Performed by: INTERNAL MEDICINE

## 2024-02-13 NOTE — TELEPHONE ENCOUNTER
Called pt- informed them that LFTs were normal- especially DBR was 0.3, prior increase in TBR was likely 2/2 Gilbert's.

## 2024-02-19 ENCOUNTER — TELEPHONE (OUTPATIENT)
Dept: GASTROENTEROLOGY | Facility: HOSPITAL | Age: 64
End: 2024-02-19
Payer: MEDICAID

## 2024-02-19 DIAGNOSIS — B96.81 HELICOBACTER PYLORI GASTRITIS: ICD-10-CM

## 2024-02-19 DIAGNOSIS — K29.70 HELICOBACTER PYLORI GASTRITIS: ICD-10-CM

## 2024-02-19 RX ORDER — PANTOPRAZOLE SODIUM 40 MG/1
40 TABLET, DELAYED RELEASE ORAL 2 TIMES DAILY
Qty: 60 TABLET | Refills: 0 | Status: SHIPPED | OUTPATIENT
Start: 2024-02-19 | End: 2024-03-19 | Stop reason: WASHOUT

## 2024-02-21 DIAGNOSIS — I50.22 CHRONIC SYSTOLIC CONGESTIVE HEART FAILURE (MULTI): Primary | ICD-10-CM

## 2024-02-21 RX ORDER — DAPAGLIFLOZIN 10 MG/1
10 TABLET, FILM COATED ORAL DAILY
Qty: 30 TABLET | Refills: 4 | Status: SHIPPED | OUTPATIENT
Start: 2024-02-21

## 2024-02-21 RX ORDER — PRAVASTATIN SODIUM 40 MG/1
40 TABLET ORAL NIGHTLY
Qty: 30 TABLET | Refills: 4 | Status: SHIPPED | OUTPATIENT
Start: 2024-02-21

## 2024-02-21 RX ORDER — SPIRONOLACTONE 25 MG/1
25 TABLET ORAL DAILY
Qty: 30 TABLET | Refills: 4 | Status: SHIPPED | OUTPATIENT
Start: 2024-02-21

## 2024-02-21 RX ORDER — ASPIRIN 81 MG/1
81 TABLET ORAL
Qty: 30 TABLET | Refills: 4 | Status: SHIPPED | OUTPATIENT
Start: 2024-02-21

## 2024-02-29 ENCOUNTER — OFFICE VISIT (OUTPATIENT)
Dept: INFECTIOUS DISEASES | Facility: CLINIC | Age: 64
End: 2024-02-29
Payer: MEDICAID

## 2024-02-29 VITALS
TEMPERATURE: 97.9 F | SYSTOLIC BLOOD PRESSURE: 121 MMHG | HEART RATE: 76 BPM | WEIGHT: 195 LBS | BODY MASS INDEX: 27.3 KG/M2 | DIASTOLIC BLOOD PRESSURE: 90 MMHG | HEIGHT: 71 IN

## 2024-02-29 DIAGNOSIS — K29.70 HELICOBACTER POSITIVE GASTRITIS: Primary | ICD-10-CM

## 2024-02-29 DIAGNOSIS — B96.81 HELICOBACTER POSITIVE GASTRITIS: Primary | ICD-10-CM

## 2024-02-29 PROCEDURE — 99205 OFFICE O/P NEW HI 60 MIN: CPT | Performed by: INTERNAL MEDICINE

## 2024-02-29 PROCEDURE — 3074F SYST BP LT 130 MM HG: CPT | Performed by: INTERNAL MEDICINE

## 2024-02-29 PROCEDURE — 1036F TOBACCO NON-USER: CPT | Performed by: INTERNAL MEDICINE

## 2024-02-29 PROCEDURE — 3080F DIAST BP >= 90 MM HG: CPT | Performed by: INTERNAL MEDICINE

## 2024-02-29 RX ORDER — TETRACYCLINE HYDROCHLORIDE 500 MG/1
500 CAPSULE ORAL 4 TIMES DAILY
Qty: 112 CAPSULE | Refills: 0 | Status: SHIPPED | OUTPATIENT
Start: 2024-02-29 | End: 2024-03-28

## 2024-02-29 RX ORDER — OMEPRAZOLE 20 MG/1
20 TABLET, DELAYED RELEASE ORAL
Qty: 56 TABLET | Refills: 0 | Status: SHIPPED | OUTPATIENT
Start: 2024-02-29 | End: 2024-03-27

## 2024-02-29 RX ORDER — METRONIDAZOLE 500 MG/1
500 TABLET ORAL 4 TIMES DAILY
Qty: 112 TABLET | Refills: 0 | Status: SHIPPED | OUTPATIENT
Start: 2024-02-29 | End: 2024-03-28

## 2024-02-29 RX ORDER — AMOXICILLIN 500 MG/1
1000 CAPSULE ORAL EVERY 12 HOURS
Qty: 112 CAPSULE | Refills: 0 | Status: SHIPPED | OUTPATIENT
Start: 2024-02-29 | End: 2024-03-28

## 2024-02-29 ASSESSMENT — ENCOUNTER SYMPTOMS
RESPIRATORY NEGATIVE: 1
MUSCULOSKELETAL NEGATIVE: 1
CARDIOVASCULAR NEGATIVE: 1
CONSTITUTIONAL NEGATIVE: 1
ALLERGIC/IMMUNOLOGIC NEGATIVE: 1
PSYCHIATRIC NEGATIVE: 1
ROS GI COMMENTS: MILD BLOATING
NEUROLOGICAL NEGATIVE: 1
HEMATOLOGIC/LYMPHATIC NEGATIVE: 1
EYES NEGATIVE: 1

## 2024-02-29 ASSESSMENT — PAIN SCALES - GENERAL: PAINLEVEL: 0-NO PAIN

## 2024-02-29 NOTE — PROGRESS NOTES
Infectious Diseases Clinic Consult Note:    Referred by Lexie Mathews MD  Primary MD: Aldair Gallardo MD  Reason for Consult: drug resistant H.pylir infection     History of Current Issue  Arnaldo Francisco is a 63 y.o. year old male with a  PMHx of HTN, asthma, emphysema, COPD (on O2), Crohn's disease (dx in 2001, rx with mesalazine and prednisone).    Started started complaining of bloating 1 and a 1/2 year ago. This was aggravated by eating and drinking. He was diagnosed with gastric H. Pylori infection. He has undergone 2 courses of triple therapy without resolution based on breathing tests.     He underwent Endoscopy with biopsy on 12/06/2023, with pathology reporting gastric mucosa positive for H. Pylori with high resistance profile of GyrA N87I. He was referred to ID for further management.     Currently reports fewer symptoms although still does experience bloating from time to time. Denies fever, SOPB, abdominal pain, nausea, vomiting, normal bowel habits.      PAST MEDICAL HISTORY:  Past Medical History:   Diagnosis Date    CHF (congestive heart failure) (CMS/[facility])     COPD (chronic obstructive pulmonary disease) (CMS/[facility])     Crohn's disease (CMS/[facility])     Diabetes mellitus (CMS/[facility])     Gastro-esophageal reflux disease without esophagitis     HTN (hypertension)     On home oxygen therapy     Opioid use, unspecified, uncomplicated     Heroin use, on methadone therapy    CHARLENE (obstructive sleep apnea)     Pancreatic cyst     Personal history of transient ischemic attack (TIA), and cerebral infarction without residual deficits     History of stroke     PAST SURGICAL HISTORY:  Past Surgical History:   Procedure Laterality Date    COLONOSCOPY      ESOPHAGOGASTRODUODENOSCOPY      KNEE ARTHROSCOPY W/ MENISCAL REPAIR       ALLERGIES:    Allergies   Allergen Reactions    Shellfish Containing Products Anaphylaxis    Ibuprofen Hives       MEDICATIONS:      Current Outpatient Medications:     albuterol (Ventolin HFA) 90  mcg/actuation inhaler, Inhale., Disp: , Rfl:     albuterol 0.63 mg/3 mL nebulizer solution, Take 3 mL (0.63 mg) by nebulization 4 times a day as needed., Disp: , Rfl:     aspirin 81 mg EC tablet, Take 1 tablet (81 mg) by mouth once daily., Disp: 30 tablet, Rfl: 4    dapagliflozin propanediol (Farxiga) 10 mg, Take 1 tablet (10 mg) by mouth once daily., Disp: 30 tablet, Rfl: 4    Entresto  mg tablet, Take 1 tablet by mouth 2 times a day., Disp: , Rfl:     ferrous sulfate 325 (65 Fe) MG tablet, Take 1 tablet by mouth once daily., Disp: , Rfl:     fluticasone propion-salmeteroL (Advair Diskus) 250-50 mcg/dose diskus inhaler, USE 1 PUFF BY MOUTH TWICE DAILY. RINSE MOUTH WITH WATER AFTER USE TO REDUCE AFTERTASTE. DO NOT SWALLOW, Disp: 60 each, Rfl: 1    fluticasone propion-salmeteroL (Advair Diskus) 500-50 mcg/dose diskus inhaler, Inhale 1 puff 2 times a day. Rinse mouth with water after use to reduce aftertaste and incidence of candidiasis. Do not swallow., Disp: 1 each, Rfl: 3    gabapentin (Neurontin) 600 mg tablet, Take 1 tablet (600 mg) by mouth. Take 600 mg in the morning, 600 mg midday, and 1200 mg at bedtime, Disp: , Rfl:     ipratropium/albuterol sulfate (COMBIVENT RESPIMAT INHL), Inhale 2 puffs., Disp: , Rfl:     mesalamine (Lialda) 1.2 gram EC tablet, Take 1 tablet (1.2 g) by mouth 2 times a day. Do not crush, chew, or split., Disp: 60 tablet, Rfl: 11    methadone HCl (METHADONE ORAL), Take 60 mL by mouth once daily. Pt states last dose was saturday, Disp: , Rfl:     metoprolol succinate XL (Toprol-XL) 200 mg 24 hr tablet, TAKE 1 TABLET BY MOUTH ONCE DAILY, Disp: 90 tablet, Rfl: 3    nitroglycerin (Nitrostat) 0.4 mg SL tablet, Place under the tongue. Dissolve 1 tab under the tongue as needed for chest pain every 5 minutes up to 3 times if no relief call 911, Disp: , Rfl:     pantoprazole (ProtoNix) 40 mg EC tablet, Take 1 tablet (40 mg) by mouth 2 times a day., Disp: 60 tablet, Rfl: 0    pravastatin  (Pravachol) 40 mg tablet, Take 1 tablet (40 mg) by mouth once daily at bedtime., Disp: 30 tablet, Rfl: 4    spironolactone (Aldactone) 25 mg tablet, Take 1 tablet (25 mg) by mouth once daily., Disp: 30 tablet, Rfl: 4    tamsulosin (Flomax) 0.4 mg 24 hr capsule, TAKE 1 CAPSULE BY MOUTH ONCE DAILY, Disp: 90 capsule, Rfl: 10    tiotropium (Spiriva Respimat) 2.5 mcg/actuation inhaler, INHALE 2 PUFFS BY MOUTH EVERY DAY, Disp: 4 g, Rfl: 1    levocetirizine (Xyzal) 5 mg tablet, Take by mouth once daily., Disp: , Rfl:     FAMILY HISTORY:   Family History   Problem Relation Name Age of Onset    Hypertension Mother      Heart disease Mother      Heart disease Brother      Hypertension Sibling          SOCIAL HISTORY:       Marital Status:  Tobacco / Smokeless tobacco/ Vaping:   reports that he quit smoking about 21 years ago. His smoking use included cigarettes. He has a 20.00 pack-year smoking history. He has never been exposed to tobacco smoke. He has never used smokeless tobacco.   Alcohol:   Social History     Substance and Sexual Activity   Alcohol Use Not Currently    Comment: no drinking for 1 month now      Drug Use:    Social History     Substance and Sexual Activity   Drug Use Not Currently    Types: Heroin    Comment: snort heroin but I am in the methadone program x 10 years         IMMUNIZATIONS:    Immunization History   Administered Date(s) Administered    Flu vaccine (IIV4), preservative free *Check age/dose* 11/25/2014, 10/10/2017, 09/16/2021, 11/04/2022    Flu vaccine, quadrivalent, no egg protein, age 6 month or greater (FLUCELVAX) 10/21/2020    Influenza, Unspecified 11/04/2022    Moderna SARS-CoV-2 Vaccination 03/18/2021    Pfizer COVID-19 vaccine, bivalent, age 5y-11y (10 mcg/0.2 mL) 11/04/2022    Pneumococcal polysaccharide vaccine, 23-valent, age 2 years and older (PNEUMOVAX 23) 11/25/2014, 11/04/2022    Pneumococcal, Unspecified 11/25/2014    SARS-CoV-2, Unspecified 08/03/2022    Td (adult),  "unspecified 08/13/2014    Tdap vaccine, age 7 year and older (BOOSTRIX, ADACEL) 02/12/2016       REVIEW OF SYSTEMS:  Review of Systems   Constitutional: Negative.    HENT: Negative.     Eyes: Negative.    Respiratory: Negative.     Cardiovascular: Negative.    Gastrointestinal:         Mild bloating   Genitourinary: Negative.    Musculoskeletal: Negative.    Skin: Negative.    Allergic/Immunologic: Negative.    Neurological: Negative.    Hematological: Negative.    Psychiatric/Behavioral: Negative.     All other systems reviewed and are negative.      PHYSICAL EXAMINATION:     Visit Vitals  /90 (BP Location: Right arm, Patient Position: Sitting, BP Cuff Size: Large adult)   Pulse 76   Temp 36.6 °C (97.9 °F) (Oral)   Ht 1.803 m (5' 11\")   Wt 88.5 kg (195 lb)   BMI 27.20 kg/m²   Smoking Status Former   BSA 2.11 m²        EXAM:   Physical Exam  Vitals reviewed.   Constitutional:       Appearance: Normal appearance.   HENT:      Head: Normocephalic.      Nose: Nose normal.   Eyes:      Extraocular Movements: Extraocular movements intact.      Pupils: Pupils are equal, round, and reactive to light.   Cardiovascular:      Rate and Rhythm: Normal rate and regular rhythm.   Pulmonary:      Effort: Pulmonary effort is normal.   Abdominal:      General: Abdomen is flat. Bowel sounds are normal.   Musculoskeletal:         General: Normal range of motion.      Cervical back: Normal range of motion.   Skin:     General: Skin is warm.   Neurological:      General: No focal deficit present.      Mental Status: He is alert.   Psychiatric:         Mood and Affect: Mood normal.         Behavior: Behavior normal.         Thought Content: Thought content normal.         Judgment: Judgment normal.          DATA:  No results found for the last 90 days.     Other Relevant Studies:     ENDOCOPY 12/06/2023  Findings  EGD:  The esophagus appeared normal.  Mild, patchy erythematous mucosa in the stomach; performed cold forceps " biopsy.  The duodenum and major papilla appeared normal.  EUS:  The entire bile duct was dilated to 10 mm. Degree of smooth tapering seen, without stricture or obstructing lesion.  The gallbladder was visualized and appeared normal.  The pancreatic parenchyma was visualized in the entire pancreas. The parenchyma had generalized hyperechoic strands. The parenchyma had generalized lobularity without honeycombing. The pancreatic duct measured 4 mm at the head, with dilated side branched in this region, 6 mm in the pancreatic neck, 5 mm at the body and 3 mm at the tail.   One round and septated cyst measuring 17 mm x 14 mm with connection with ducts and well-defined margins. 1 fine needle aspiration pass was taken with a 22 gauge needle using a transduodenal approach guided by Doppler. A sample of clear and thick-appearing aspirate was obtained. The sample was sent for CEA, glucose, amylase, and cytology analysis.            Component    H. Pylori Drug Susceptibility Results    H. pylori DNA is Detected.     RESISTANCE ASSOCIATED VARIANTS DETECTED:  GyrA N87I        INTERPRETATION AND POTENTIAL THERAPEUTIC REGIMENS:  Considering the results of the mutation analysis, the following therapeutic regimens should be considered:     -Bismuth quadruple therapy: bismuth + tetracycline + metronidazole + PPI for 14 days  -Clarithromycin triple therapy: clarithromycin + amoxicillin (or metronidazole) + PPI for 14 days  -Clarithromycin concomitant therapy: clarithromycin + amoxicillin + metronidazole + PPI for 14 days     Regimen Dosing Guide based on regimens in the  Adult H. pylori Treatment Guidelines (NOTE: This list includes all H. pylori regimens; use only the dosing corresponding to the above specified regimens.)  -Bismuth quadruple therapy* = Bismuth 300-525 mg QID + Tetracycline 500 mg QID + Metronidazole 500 mg QID + PPI BID x 14 days  *Available co-formulated as Pylera (bismuth subcitrate 140 mg, metronidazole 125 mg,  tetracycline 125 mg per capsule dosed as 3 capsules four times daily; twice daily PPI must be administered separately) x 10 days  -Clarithromycin triple therapy** = Clarithromycin 500 mg BID + Amoxicillin 1 g BID (or Metronidazole 500 mg BID) + PPI BID x 14 days  **Available co-packaged as Prevpac (1 tablet clarithromycin 500 mg, 2 capsules amoxicillin 500 mg, and 1 capsule lansoprazole 30 mg dosed twice daily)    -Clarithromycin concomitant therapy = Clarithromycin 500 mg BID + Amoxicillin 1g BID + Metronidazole 500 mg BID + PPI BID x 14 days  -High dose dual therapy = Amoxicillin 1 g TID + double-dose PPI BID x 14 days  -Levofloxacin quadruple therapy = Bismuth 300-525 mg QID + Levofloxacin 500 mg once daily + Amoxicillin 1 g BID (or Metronidazole 500 mg BID) + PPI BID x 10-14 days  -Levofloxacin triple therapy = Levofloxacin 500 mg once daily + Amoxicillin 1 g BID + PPI BID x 10-14 days  -Rifabutin triple therapy*^* = Rifabutin 300 mg once daily + Amoxicillin 1 g BID  + PPI BID x 10 days  *^*Available co-formulated as Talicia (rifabutin 12.5 mg, amoxicillin 250 mg, omeprazole 10 mg per capsule dosed as 4 capsules three times daily)     Proton Pump Inhibitor Options  All regimens dose proton pump inhibitors twice daily. High dose dual therapy uses double the standard PPI dose administered twice daily. Individual PPIs may have varying insurance coverage.  -Lansoprazole 30 mg  -Omeprazole 20 mg  -Esomeprazole 20 mg  -Pantoprazole 40 mg  -Rabeprazole 20 mg  -Dexlansoprazole 30 mg     Notes:  1.Treatment recommendations are guided by mutations detected in hotspot regions known to confer resistance to specific antimicrobial agents, including 23S (clarithromycin), 16S (tetracycline), and gyrA (levofloxacin) and additionally take into consideration the 2021 AGA Clinical Practice Update on Management of Refractory H. pylori [1,2]. Individual patient characteristics such as known prior failed regimens, antibiotic allergies  and severe intolerances, risks of fluoroquinolones, treatment adherence, and insurance coverage may further influence choice of regimen. Vonoprazan-containing regimens are not included due to absence of guidelines or consensus statements and limited clinical experience but may be considered in individual cases.  2.Consider referral to allergy for patients with history of penicillin allergy if amoxicillin is included in a preferred regimen. Refer to Penicillin Allergy Assessment Guidelines for further information.  3.Doxycycline is not considered equivalent to tetracycline in bismuth-based quadruple therapy.  4.Amoxicillin and metronidazole are considered equivalent in clarithromycin triple therapy and levofloxacin quadruple therapy regimens.  5.Consider shorter 10-day course or avoid levofloxacin-containing regimens in patients at high risk for fluoroquinolone toxicity (e.g., prior C. difficile infection, CNS abnormalities, known QT prolongation, etc.).  6.Regimens including rifabutin are generally considered not preferred due to higher risk of toxicity. Potential toxicities include orange discoloration of body fluids (common), rash, flu-like syndrome, uveitis, and cytopenias.          ASSESSMENT / RECOMMENDATIONS:   A 62yo male with a history of HTN, asthma, emphysema, COPD (on O2), and Crohn's disease presented with persistent bloating exacerbated by eating and drinking. Diagnosed with H. pylori, underwent two unsuccessful triple therapies. Endoscopy on 12/06/2023 revealed gastric mucosa positive for HELICOBACTER PYLORI with GyrA N87I resistance. Referred to ID for management. Currently experiences occasional bloating but denies fever, SOPB, abdominal pain, nausea, vomiting, maintaining normal bowel habits.    IMPRESSION  Resistant gastric H. pylori infection  --failed 2 courses of triple therapy  --Has an allergy to ibuprofen (hives), precluding the use of bismuth    PLAN  Quadruple therapy with the following  regimen for 28 days  ---Amoxicillin 1 gm BID  ---Tetracycline 500 mg QID  ---Metronidazole 500 mg QID  ---Omeprazole 20 mg BID      2. Repeat breathing test with GI in 6 weeks    3. RTC prn if no resolution    Pt is in agreement with the plan    I spent 45 minutes in the professional and overall care of this patient.      Abelino Gaspar MD MPH

## 2024-02-29 NOTE — LETTER
02/29/24    Lexie Zelaya MD  [id number]  Department Of Medicine-Gastroenterology  [facility] [address]      Dear Dr. Lexie Zelaya MD,    Thank you for referring your patient, Arnaldo Francisco, to receive care in my office. I have enclosed a summary of the care provided to Arnaldo on 02/29/24.    Please contact me with any questions you may have regarding the visit.    Sincerely,         Abelino Gaspar MD MPH  [id number]  [address] [address]    CC: No Recipients

## 2024-02-29 NOTE — LETTER
02/29/24    Aldair Gallardo MD  [address]  [facility] [address]      Dear Dr. Aldair Gallardo MD,    I am writing to confirm that your patient, Arnaldo Francisco, received care in my office on 02/29/24. I have enclosed a summary of the care provided to Arnaldo for your reference.    Please contact me with any questions you may have regarding the visit.    Sincerely,         Abelino Gaspar MD MPH  [id number]  [facility] [address]  [facility] [address]    CC: No Recipients

## 2024-03-05 DIAGNOSIS — B96.81 GASTRITIS, HELICOBACTER PYLORI: Primary | ICD-10-CM

## 2024-03-05 DIAGNOSIS — K29.70 GASTRITIS, HELICOBACTER PYLORI: Primary | ICD-10-CM

## 2024-03-18 ENCOUNTER — TELEPHONE (OUTPATIENT)
Dept: GASTROENTEROLOGY | Facility: HOSPITAL | Age: 64
End: 2024-03-18

## 2024-03-18 ENCOUNTER — APPOINTMENT (OUTPATIENT)
Dept: LAB | Facility: LAB | Age: 64
End: 2024-03-18
Payer: MEDICAID

## 2024-03-18 DIAGNOSIS — J43.9 PULMONARY EMPHYSEMA, UNSPECIFIED EMPHYSEMA TYPE (MULTI): ICD-10-CM

## 2024-03-18 RX ORDER — TIOTROPIUM BROMIDE INHALATION SPRAY 3.12 UG/1
2 SPRAY, METERED RESPIRATORY (INHALATION) DAILY
Qty: 4 G | Refills: 1 | Status: SHIPPED | OUTPATIENT
Start: 2024-03-18 | End: 2024-05-20 | Stop reason: SDUPTHER

## 2024-03-19 ENCOUNTER — OFFICE VISIT (OUTPATIENT)
Dept: GASTROENTEROLOGY | Facility: HOSPITAL | Age: 64
End: 2024-03-19
Payer: MEDICAID

## 2024-03-19 VITALS
SYSTOLIC BLOOD PRESSURE: 122 MMHG | TEMPERATURE: 97.7 F | HEART RATE: 76 BPM | BODY MASS INDEX: 26.04 KG/M2 | DIASTOLIC BLOOD PRESSURE: 82 MMHG | OXYGEN SATURATION: 97 % | WEIGHT: 186.7 LBS | RESPIRATION RATE: 18 BRPM

## 2024-03-19 DIAGNOSIS — K52.9 COLITIS: ICD-10-CM

## 2024-03-19 DIAGNOSIS — K50.10 CROHN'S DISEASE OF LARGE INTESTINE WITHOUT COMPLICATION (MULTI): ICD-10-CM

## 2024-03-19 PROCEDURE — 1036F TOBACCO NON-USER: CPT | Performed by: STUDENT IN AN ORGANIZED HEALTH CARE EDUCATION/TRAINING PROGRAM

## 2024-03-19 PROCEDURE — 3079F DIAST BP 80-89 MM HG: CPT | Performed by: STUDENT IN AN ORGANIZED HEALTH CARE EDUCATION/TRAINING PROGRAM

## 2024-03-19 PROCEDURE — 99215 OFFICE O/P EST HI 40 MIN: CPT | Performed by: STUDENT IN AN ORGANIZED HEALTH CARE EDUCATION/TRAINING PROGRAM

## 2024-03-19 PROCEDURE — 3074F SYST BP LT 130 MM HG: CPT | Performed by: STUDENT IN AN ORGANIZED HEALTH CARE EDUCATION/TRAINING PROGRAM

## 2024-03-19 RX ORDER — MESALAMINE 1.2 G/1
1200 TABLET, DELAYED RELEASE ORAL 2 TIMES DAILY
Qty: 60 TABLET | Refills: 11 | Status: SHIPPED | OUTPATIENT
Start: 2024-03-19 | End: 2025-03-19

## 2024-03-19 ASSESSMENT — PAIN SCALES - GENERAL: PAINLEVEL: 0-NO PAIN

## 2024-03-19 NOTE — PATIENT INSTRUCTIONS
Thank you for coming to GI clinic today.    -continue lialda 2.4 g daily  -continue Quadruple therapy with the following regimen for 28 days: Amoxicillin 1 gm BID, Tetracycline 500 mg QID, Metronidazole 500 mg QID and Omeprazole 20 mg BID  -Schedule Urea Breath Test 4-6 weeks after completion of Antibiotic therapy  -follow up with general surgery for annual MRI/MRCP surveillance of pancreatic cyst  -repeat colonoscopy in 2026 for endoscopic disease activity assessment of Crohn's colitis

## 2024-03-19 NOTE — PROGRESS NOTES
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      Prior hx:     Per pt he was dxed in 2001 with presenting sx of BRBPR. He was on asacol for  several years before being switched to sulfasalazine. He had been on  sulfasalazine 1.5g a day. He had never been on any biologics or  immunomodulators.     Fecal calprotectin in June 2021 was 1680 and he was started on lialda in June 2021.        C-scope 5/2021: - The distal rectum and anal verge are normal on retroflexion  view. Four 2 to 3 mm polyps in the descending colon, removed with a cold biopsy  forceps. One 3 mm polyp in the ascending colon, removed with a cold biopsy  forceps. Resected and retrieved (path: adenoma w/ LGD). Erythematous mucosa at  the appendiceal orifice. Biopsied. Regional biopsies obtained in four quadrant  fashion from 70cm to 10 cm- at least 32 biopsies obtained in total  only visibile abnormalities: pseudopolyps throughout the colon. erythematous and  friable Ao. scarring significant from transverse colon to sigmoid (path: no e/o  granulomas or dysplasia or active colitis). Tried to get into TI but could not.     MRI pancreas 8/2022: unchanged [id number] panc uncinate process septated cystic lesion, likely side branch IPMN     MRI pancreas w/wo contrast 6/2021: 1. No pancreatic mass. No biliary ductal dilatation  or pancreatic duct dilatation. No choledocholithiasis. 2. A 2 cm septated cyst in the uncinate  process, does not communicate with the main pancreatic duct, may represent side branch IPMN. No abnormal  enhancement. Follow-up.    He saw surgery follow up and they ordered surveillance MRI/MRCP on Nov 2023 which showed:  Stable cystic nonenhancing pancreatic uncinate lesion, likely  representing a side-branch IPMN. Mild increase in diffuse prominence of the  pancreatic duct, of doubtful clinical significance given lack of additional new findings. Mild interval increase in prominence of the extrahepatic bile duct without evidence of an obstructive mass.     EUS FNA by Dr. Pettit in Dec 2023: CBD was dilated to 10 mm without stricture or choledocholithiasis. The main pancreatic duct was dilated in the HoP up to 6 mm. The ampulla was normal without evidence of main duct involvement. His known pancreatic cyst was without high risk features. Cytology was negative for malignant cells. Fluid studies are consistent with a mucinous cyst, as known. Seen by surgery and they plan to perform annual MRI/MRCP surveillance.     CTE 6/2021: No evidence of acute inflammatory change of the large or small  bowel. The terminal ileum appears normal. No stricture, obstruction,  fistula, or abscess. Prominent colonic stool noted, correlate for  constipation.     EGD July 2023: HP gastritis.     C-scope July 2023: Endoscopically mild inflammation from transverse to rectum, histologically normal entire colon except for chronic quiescent colitis in rectum.    he had persistent HP s/p bismuth quadruple therapy w/ UBT positive test f/b rifabutin regimen f/b repeat EGD w/ positive HP on gastric biopsies.      The Ab susceptibility testing showed that we should use bismuth quadruple or clarithromycin triple regimens both of which have been used in the past.    He saw Dr. Ferraro in Feb 2024 for resistant HP and he recommended Quadruple therapy with the following regimen for 28 days: Amoxicillin 1 gm BID, Tetracycline 500 mg QID, Metronidazole 500 mg QID and Omeprazole 20 mg BID.     Interval hx: He has been on lialda 2.4g a day. He endorses 2-3 brown semi-solid BMs a day. He denies any abd pain, diarrhea, urgency or BRBPR.    He is on week 3 of quadruple Ab therapy for resistant HP gastritis.      He denies n/v/d, melena, BRBPR, wt loss, f/c, 12 point ROS done and neg unless otherwise stated.      EIMs: denies mucositis, eye abnl, joint pains; skin blisters in b/l thighs have resolved       Labs Feb 2024: Hb 15.8, plt 219, LFTs WNL, Cr 0.96     Labs Aug-Nov 2023: Hb 14.9, plt 230, CA 19-9 elevated at 49, H.pylori breath test pos in Sept 2023, ferritin 79, TIBC% 21%, Cr 1.03, LFTs WNL     Labs May 2023: Hb 13.6, plt 223, CRP 0.23, Fecal calpro 111,      Labs April 2023: Hb 11.5, plt 278, ferritin 92, TIBC% 11% Cr 1.15, LFTs WNL, July 2021: CRP WNL, June 2021: fecal calpro 1680           PMH:   - COPD/emphysema ? severe, on supplemental O2 at night   - CHF- HFmrEF and most recent echocardiogram 1/27/2021 shows LVEF 45-50%  - Crohns disease   - CHARLENE   - COVID 19 (dec 2020)   - GERD  - lung nodule   - adenomatous colon polyp with low grade dysplasia  - numerous pseudopolyps of colon   - septated pancreatic cyst      PSH:   - left heart cath (2015)  - r knee arthroplasty (2019)      FH:  No colon cancer, pancreatic cancer  No ulcerative colitis  No Crohns disease     SH:  denies smoking, IVDU. Prior heroine abuse, quit 8 yrs ago, currently on  methadone, denies alcohol.     PE:  Gen: AXOX3. NAD  Head: NC/AT.   Eyes: anicteric sclerae  CV: No mrg. RRR. NO JVD  Pulm: non labored breathing  Abd: NTND. Tympanitic. 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      He has had excellent biochemical (CRP, fecal calpro down to 111) and endoscopic response to lialda w/ mild inflammation from transverse to rectum, histologically normal entire colon except for chronic quiescent colitis in rectum. CTE 6/2021 w/o any vicki-anal disease or small or large bowel inflammation.      Known pancreatic cyst, s/p EUS FNA in Dec 2023- no high risk features, cytology neg for malignant cells, c/w  mucinous cyst, surgery plans to c/w annual MRI/MRCP surveillance.     He saw Dr. Ferraro in Feb 2024 for resistant HP and he recommended Quadruple therapy with the following regimen for 28 days: Amoxicillin 1 gm BID, Tetracycline 500 mg QID, Metronidazole 500 mg QID and Omeprazole 20 mg BID.       Overall picture c/w colitis (favor Crohn's, vs UC) with good clinical, biochemical and endoscopic response to lialda. Active issues include resistant HP gastritis now on quadruple therapy.     Plan:  -c/w lialda to 2.4 g daily  -Quadruple therapy with the following regimen for 28 days: Amoxicillin 1 gm BID, Tetracycline 500 mg QID, Metronidazole 500 mg QID and Omeprazole 20 mg BID  -UBT 4-6 weeks after completion of Ab therapy  -c/w follow up with general surgery for annual MRI/MRCP surveillance of pancreatic cyst  -repeat colonoscopy in 2026 for endoscopic disease activity assessment of Crohn's colitis           RTC in 4-6 mths     Health maintenance in IBD: reference for PCP     -Vaccinations: counseled that it is recommended to get COVID-19 (including boosters), inactivated flu, pneumococcal, zoster (shingrix) vaccine, RSV vaccine.     -Osteoporosis screening by DEXA scan in patients with IBD if any risk factors for osteoporosis (low BMI, >3 mths cumulative steroid exposure, post-menopausal,hypogonadism). Repeat in 5 yrs if initial screen is normal--->>does not apply to this patient currently     -screen all patients with IBD for depression (PHQ9) and anxiety (GAD7) at baseline and annually---->>pt denies s/s of depression     RTC in 4 mths

## 2024-03-25 DIAGNOSIS — B96.81 HELICOBACTER POSITIVE GASTRITIS: ICD-10-CM

## 2024-03-25 DIAGNOSIS — K29.70 HELICOBACTER POSITIVE GASTRITIS: ICD-10-CM

## 2024-03-26 ENCOUNTER — HOSPITAL ENCOUNTER (OUTPATIENT)
Dept: RADIOLOGY | Facility: HOSPITAL | Age: 64
Discharge: HOME | End: 2024-03-26
Payer: MEDICAID

## 2024-03-26 DIAGNOSIS — J43.9 PULMONARY EMPHYSEMA, UNSPECIFIED EMPHYSEMA TYPE (MULTI): ICD-10-CM

## 2024-03-26 PROCEDURE — 71250 CT THORAX DX C-: CPT

## 2024-03-26 PROCEDURE — 71250 CT THORAX DX C-: CPT | Performed by: STUDENT IN AN ORGANIZED HEALTH CARE EDUCATION/TRAINING PROGRAM

## 2024-03-27 RX ORDER — OMEPRAZOLE 20 MG/1
CAPSULE, DELAYED RELEASE ORAL
Qty: 56 CAPSULE | Refills: 1 | Status: SHIPPED | OUTPATIENT
Start: 2024-03-27

## 2024-04-17 ENCOUNTER — TELEPHONE (OUTPATIENT)
Dept: CARDIOLOGY | Facility: CLINIC | Age: 64
End: 2024-04-17
Payer: MEDICAID

## 2024-04-17 DIAGNOSIS — I50.22 CHRONIC SYSTOLIC CONGESTIVE HEART FAILURE (MULTI): Primary | ICD-10-CM

## 2024-04-17 RX ORDER — SACUBITRIL AND VALSARTAN 97; 103 MG/1; MG/1
1 TABLET, FILM COATED ORAL 2 TIMES DAILY
Qty: 60 TABLET | Refills: 1 | Status: SHIPPED | OUTPATIENT
Start: 2024-04-17

## 2024-04-17 NOTE — TELEPHONE ENCOUNTER
Copied from CRM #[id number]. Topic: Information Request - Prescription Refill FAQ  >> Apr 17, 2024  9:24 AM Tomasa CISNEROS wrote:  Entresto  mg tablet [facility]---pt requesting refill

## 2024-04-26 ENCOUNTER — APPOINTMENT (OUTPATIENT)
Dept: GASTROENTEROLOGY | Facility: HOSPITAL | Age: 64
End: 2024-04-26
Payer: MEDICAID

## 2024-05-20 DIAGNOSIS — J43.9 PULMONARY EMPHYSEMA, UNSPECIFIED EMPHYSEMA TYPE (MULTI): ICD-10-CM

## 2024-05-20 RX ORDER — TIOTROPIUM BROMIDE INHALATION SPRAY 3.12 UG/1
2 SPRAY, METERED RESPIRATORY (INHALATION) DAILY
Qty: 4 G | Refills: 1 | Status: SHIPPED | OUTPATIENT
Start: 2024-05-20

## 2024-06-16 DIAGNOSIS — I50.22 CHRONIC SYSTOLIC CONGESTIVE HEART FAILURE (MULTI): ICD-10-CM

## 2024-06-17 RX ORDER — SACUBITRIL AND VALSARTAN 97; 103 MG/1; MG/1
1 TABLET, FILM COATED ORAL 2 TIMES DAILY
Qty: 60 TABLET | Refills: 0 | Status: SHIPPED | OUTPATIENT
Start: 2024-06-17

## 2024-06-18 DIAGNOSIS — J43.9 PULMONARY EMPHYSEMA, UNSPECIFIED EMPHYSEMA TYPE (MULTI): ICD-10-CM

## 2024-06-18 RX ORDER — FLUTICASONE PROPIONATE AND SALMETEROL 500; 50 UG/1; UG/1
1 POWDER RESPIRATORY (INHALATION)
Qty: 1 EACH | Refills: 5 | Status: SHIPPED | OUTPATIENT
Start: 2024-06-18 | End: 2025-06-18

## 2024-06-24 ENCOUNTER — TELEPHONE (OUTPATIENT)
Dept: GASTROENTEROLOGY | Facility: HOSPITAL | Age: 64
End: 2024-06-24
Payer: MEDICAID

## 2024-07-01 ENCOUNTER — OFFICE VISIT (OUTPATIENT)
Dept: CARDIOLOGY | Facility: CLINIC | Age: 64
End: 2024-07-01
Payer: MEDICAID

## 2024-07-01 VITALS
OXYGEN SATURATION: 100 % | HEIGHT: 71 IN | HEART RATE: 70 BPM | BODY MASS INDEX: 27.86 KG/M2 | DIASTOLIC BLOOD PRESSURE: 80 MMHG | SYSTOLIC BLOOD PRESSURE: 124 MMHG | RESPIRATION RATE: 18 BRPM | WEIGHT: 199 LBS

## 2024-07-01 DIAGNOSIS — I25.118 CORONARY ARTERY DISEASE INVOLVING NATIVE HEART WITH OTHER FORM OF ANGINA PECTORIS, UNSPECIFIED VESSEL OR LESION TYPE (CMS-HCC): ICD-10-CM

## 2024-07-01 DIAGNOSIS — I50.22 CHRONIC SYSTOLIC CONGESTIVE HEART FAILURE (MULTI): ICD-10-CM

## 2024-07-01 PROCEDURE — 3074F SYST BP LT 130 MM HG: CPT | Performed by: STUDENT IN AN ORGANIZED HEALTH CARE EDUCATION/TRAINING PROGRAM

## 2024-07-01 PROCEDURE — 99215 OFFICE O/P EST HI 40 MIN: CPT | Performed by: STUDENT IN AN ORGANIZED HEALTH CARE EDUCATION/TRAINING PROGRAM

## 2024-07-01 PROCEDURE — 1036F TOBACCO NON-USER: CPT | Performed by: STUDENT IN AN ORGANIZED HEALTH CARE EDUCATION/TRAINING PROGRAM

## 2024-07-01 PROCEDURE — 3079F DIAST BP 80-89 MM HG: CPT | Performed by: STUDENT IN AN ORGANIZED HEALTH CARE EDUCATION/TRAINING PROGRAM

## 2024-07-01 RX ORDER — PRAVASTATIN SODIUM 40 MG/1
40 TABLET ORAL NIGHTLY
Qty: 90 TABLET | Refills: 3 | Status: SHIPPED | OUTPATIENT
Start: 2024-07-01

## 2024-07-01 RX ORDER — SACUBITRIL AND VALSARTAN 97; 103 MG/1; MG/1
1 TABLET, FILM COATED ORAL 2 TIMES DAILY
Qty: 60 TABLET | Refills: 11 | Status: SHIPPED | OUTPATIENT
Start: 2024-07-01

## 2024-07-01 RX ORDER — FUROSEMIDE 20 MG/1
20 TABLET ORAL DAILY PRN
Qty: 30 TABLET | Refills: 11 | Status: SHIPPED | OUTPATIENT
Start: 2024-07-01 | End: 2025-07-01

## 2024-07-01 RX ORDER — SPIRONOLACTONE 25 MG/1
25 TABLET ORAL DAILY
Qty: 90 TABLET | Refills: 3 | Status: SHIPPED | OUTPATIENT
Start: 2024-07-01

## 2024-07-01 RX ORDER — METOPROLOL SUCCINATE 200 MG/1
200 TABLET, EXTENDED RELEASE ORAL DAILY
Qty: 90 TABLET | Refills: 3 | Status: SHIPPED | OUTPATIENT
Start: 2024-07-01

## 2024-07-01 RX ORDER — DAPAGLIFLOZIN 10 MG/1
10 TABLET, FILM COATED ORAL DAILY
Qty: 90 TABLET | Refills: 3 | Status: SHIPPED | OUTPATIENT
Start: 2024-07-01

## 2024-07-01 ASSESSMENT — ENCOUNTER SYMPTOMS: DEPRESSION: 0

## 2024-07-01 NOTE — PROGRESS NOTES
"Past cardiologist: Dr. PATRICIA Nguyen  Accompanied by : wife     Chief Complaint  Ambulatory heart failure care      History of Present Illness  63 year old medically disabled /watters who presents for advanced heart failure care. 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  Mr. Francisco has a history of HFmrEF and echocardiogram 1/27/2021 shows LVEF 45-50% LVIDD 5.5 cm with preserved right ventricular systolic function. His last coronary interrogation was a left heart catheterization 6/2015 which showed normal coronaries.Prev needed LiFeVest and had some LV recovery. On nuclear pharmacological stress test 4/2021 there was no inducible ischemia.  He had been lost to Cardiology team since 6/2021 and at las visit was very hypertensive and reported dyspnea.HF GDMT was reinitiated and is now optimized.  He is fully adherent with all heart failure medications.    Nolan reports having \"a cold\" approximately 1 week earlier.  This was catheterized by nasal clear discharge and some dyspnea with exertion.  The symptoms have completely resolved.    He denies chest pain, dyspnea at rest, exertional dyspnea, PND, leg swelling, syncope, presyncope, palpitations.  He continues to have a chronic two-pillow orthopnea which is unchanged.    He has not been to the emergency room or been hospitalized for heart failure indications.    Functionally he reports doing very well.  He \"runs up and down\" a flight of stairs at least \"15 times a day\" with no exertional symptoms.  His exercise capacity on a flat surface is unlimited.    Surgical Hx:  - Nil    Social Hx:  - " Please take medication as prescribed.  Please drink plenty of fluids.  Please follow-up with your OB/GYN for repeat exam in 2 to 3 days.  Please return for any new or worsening symptoms   "Smoking-ex-smoker, quit  prev 1 PPD for 15-20 years  - ETOH- prev heavy drinker, quit heavy drinking.  He tells me he is down to 2-3 beers a day.  No longer drinking this.  - Illicit drugs-previous heroin use, quit   - Lives with wife   - has children - no heart disease    Family Hx:  Specifically, there is no family history of CAD,PPM, LVAD, OHT, arrhythmias, CVA, or sudden cardiac death.    Mother-  with \"heart problems\" in hr \"60s. \"enlarged heart\"  Brother-  with \"heart trouble\" and had ICD  Brother- \"heart trouble\"    Medication reconciliation completed, see below.     Investigations:    The electronic medical record has been reviewed by me for salient history. All cardiovascular imaging and testing available in the electronic medical record, and [employer] has been reviewed.     Medication Documentation Review Audit       Reviewed by Jessika Moreland RN (Registered Nurse) on 24 at 1049      Medication Order Taking? Sig Documenting Provider Last Dose Status   albuterol (Ventolin HFA) 90 mcg/actuation inhaler [id number] Yes Inhale. Historical Provider, MD Taking Active   albuterol 0.63 mg/3 mL nebulizer solution [id number] Yes Take 3 mL (0.63 mg) by nebulization 4 times a day as needed. Historical Provider, MD Taking Active   aspirin 81 mg EC tablet [id number] Yes Take 1 tablet (81 mg) by mouth once daily. Ghazal Simms MD PhD Taking Active   dapagliflozin propanediol (Farxiga) 10 mg [id number] Yes Take 1 tablet (10 mg) by mouth once daily. Ghazal Simms MD PhD Taking Active   ferrous sulfate 325 (65 Fe) MG tablet [id number] No Take 1 tablet by mouth once daily. Historical Provider, MD Not Taking Active   fluticasone propion-salmeteroL (Advair Diskus) 500-50 mcg/dose diskus inhaler [id number] No Inhale 1 puff 2 times a day. Rinse mouth with water after use to reduce aftertaste and incidence of candidiasis. Do not swallow.   Patient not taking: Reported on 2024    Janette Bermeo, " APRN-CNP Not Taking Flag for Review   fluticasone propion-salmeteroL (Advair Diskus) 500-50 mcg/dose diskus inhaler [id number] Yes Inhale 1 puff 2 times a day. Janette Bermeo, APRN-CNP Taking Active   gabapentin (Neurontin) 600 mg tablet [id number] Yes Take 1 tablet (600 mg) by mouth. Take 600 mg in the morning, 600 mg midday, and 1200 mg at bedtime Historical Provider, MD Taking Active   ipratropium/albuterol sulfate (COMBIVENT RESPIMAT INHL) [id number] Yes Inhale 2 puffs. Historical Provider, MD Taking Active   levocetirizine (Xyzal) 5 mg tablet [id number] Yes Take by mouth once daily. Historical Provider, MD Taking Active   mesalamine (Lialda) 1.2 gram EC tablet [id number] Yes Take 1 tablet (1.2 g) by mouth 2 times a day. Do not crush, chew, or split. Lexie Zelaya MD Taking Active   methadone HCl (METHADONE ORAL) [id number] Yes Take 60 mL by mouth once daily. Pt states last dose was saturday Historical Provider, MD Taking Active   metoprolol succinate XL (Toprol-XL) 200 mg 24 hr tablet [id number] Yes TAKE 1 TABLET BY MOUTH ONCE DAILY Aldair Gallardo MD Taking Active   nitroglycerin (Nitrostat) 0.4 mg SL tablet [id number] Yes Place under the tongue. Dissolve 1 tab under the tongue as needed for chest pain every 5 minutes up to 3 times if no relief call 911 Historical Provider, MD Taking Active   omeprazole (PriLOSEC) 20 mg DR capsule [id number] Yes TAKE 1 CAPSULE(20 MG) BY MOUTH TWICE DAILY BEFORE MEALS. DO NOT CRUSH, CHEW, OR SPLIT Abelino Gaspar MD MPH Taking Active   pravastatin (Pravachol) 40 mg tablet [id number] Yes Take 1 tablet (40 mg) by mouth once daily at bedtime. Ghazal Simms MD PhD Taking Active   sacubitriL-valsartan (Entresto)  mg tablet [id number] Yes Take 1 tablet by mouth 2 times a day. Ghazal Simms MD PhD Taking Active   spironolactone (Aldactone) 25 mg tablet [id number] Yes Take 1 tablet (25 mg) by mouth once daily. Ghazal Simms MD PhD Taking Active   tamsulosin (Flomax) 0.4 mg  "24 hr capsule [id number] Yes TAKE 1 CAPSULE BY MOUTH ONCE DAILY Aldair Gallardo MD Taking Active   tiotropium (Spiriva Respimat) 2.5 mcg/actuation inhaler [id number] Yes Inhale 2 puffs once daily. CLAIR Colby-CNP Taking Active                   Allergies   Allergen Reactions    Shellfish Containing Products Anaphylaxis    Ibuprofen Hives      Visit Vitals  /80 (BP Location: Left arm, Patient Position: Sitting, BP Cuff Size: Large adult)   Pulse 70   Resp 18   Ht 1.803 m (5' 11\")   Wt 90.3 kg (199 lb)   SpO2 100%   BMI 27.75 kg/m²   Smoking Status Former   BSA 2.13 m²      IMPRESSION:    63 year old medically disabled /watters who presents for advanced heart failure care. 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  Mr. Francisco has a history of HFmrEF and echocardiogram 1/27/2021 shows LVEF 45-50% LVIDD 5.5 cm with preserved right ventricular systolic function. His last coronary interrogation was a left heart catheterization 6/2015 which showed normal coronaries.Prev needed LiFeVest and had some LV recovery. On nuclear pharmacological stress test 4/2021 there was no inducible ischemia.  He had been lost to Cardiology team since 6/2021 and at las visit was very hypertensive and reported dyspnea.HF GDMT was reinitiated and is now optimized.  He is fully adherent with all heart failure medications.    NYHA Functional Class: 1 (improved)  ACC/AHA Stage C heart failure  Volume status: Euvolaemic  Perfusion status: Warm to touch      PLAN:  -HF GDMT will be continued, details below  -TTE before next visit  -Genetic testing( 2 first " degree relative with CM- mother and brother) requested at last visit, appointment pending  -No current need for device therapy, LVEF> 35%  -Heart failure lifestyle modifications discussed and Qs answered.  Encouraged to do daily weights    -Medication optimisation:  BB: Continue metoprolol succinate  RAASi: Continue sacubitril/valsartan 97/103 mg twice daily   MRA: Continue spironolactone 25 mg once daily  SGLTi: Continue Dapaglilozin 10 mg qd  ISDN/Hydral: Continue to hold  Furosemide as needed with weight gain    This note was transcribed using the Dragon Dictation system. There may be grammatical, punctuation, or verbiage errors that can occur with voice recognition programs.     Ghazal Simms MD PhD

## 2024-07-01 NOTE — PATIENT INSTRUCTIONS
Thank you for coming in today. If you have any questions or concerns, you may call the Heart Failure Office at [phone] option [phone], or [phone].  You may also contact our heart failure nursing team via email on [email or web address].    For quicker results set-up your  [employer] account to receive results and other correspondence directly to your phone.    Please bring all your pills/medications to your Cardiology appointments.    **  - Please do your daily weight in the morning. Write this down    - No new medication changes today    -We will renew your heart failure medications today    - Please make the following medication changes:  1. START Furosemide 20 mg ONCE A DAYS NEEDED ONLY for weight gain of more than 3 lbs a day    - Please have the following tests done:  1.Blood tests ( comprehensive panel, BNP, CBC, iron, TIBC, ferritin)    2.  Echocardiogram, we can schedule this for you today before you leave, or you may CALL  Tel [phone]   OR    [phone] to schedule      - Please make an appointment to be seen in 6 months

## 2024-07-08 NOTE — PROGRESS NOTES
[facility]    CC: Follow up  HPI:  Arnaldo Francisco is a 64 y.o. 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    Interval History/History per chart review:    Patient was diagnosed with Crohn's disease in 2001 with presenting sx of BRBPR. He was on asacol for several years before being switched to sulfasalazine. He had been on sulfasalazine 1.5g a day. He had never been on any biologics or immunomodulators. Fecal calprotectin in June 2021 was 1680 and he was started on lialda in June 2021. Currently on Lialda 2.4 gr a day.     Other GI problems:     - Intraductal papillary mucinous neoplasms (IPMN)   *MRI pancreas 8/2022: unchanged [id number] panc uncinate process septated cystic lesion, likely side branch IPMN  *MRI pancreas w/wo contrast 6/2021: 1. No pancreatic mass. No biliary ductal dilatation or pancreatic duct dilatation. No choledocholithiasis. 2. A 2 cm septated cyst in the uncinate process, does not communicate with the main pancreatic duct, may represent side branch IPMN. No abnormal enhancement. Follow-up.  *He saw surgery follow up and they ordered surveillance MRI/MRCP on Nov 2023 which showed:  Stable cystic nonenhancing pancreatic uncinate lesion, likely representing a side-branch IPMN. Mild increase in diffuse prominence of the pancreatic duct, of doubtful clinical significance given lack of additional new findings. Mild interval increase in prominence of the extrahepatic bile duct without evidence of an obstructive mass.   *EUS FNA by Dr. Pettit in Dec 2023: CBD was dilated to 10 mm without stricture or choledocholithiasis. The main pancreatic duct was dilated in the HoP up to 6 mm. The ampulla was normal without evidence of main duct involvement. His known pancreatic cyst was without high risk features.  Cytology was negative for malignant cells. Fluid studies are consistent with a mucinous cyst, as known. Seen by surgery and they plan to perform annual MRI/MRCP surveillance.    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   *The Ab susceptibility testing showed that we should use bismuth quadruple or clarithromycin triple regimens both of which have been used in the past.   *He saw Dr. Ferraro in Feb 2024 for resistant HP and he recommended Quadruple therapy with the following regimen for 28 days: Amoxicillin 1 gm BID, Tetracycline 500 mg QID, Metronidazole 500 mg QID and Omeprazole 20 mg BID.    Per the patient:    Patient completed 28 days of quadruple therapy prescribed for H. Pylori (Amoxicillin 1 gm BID, Tetracycline 500 mg QID, Metronidazole 500 mg QID and Omeprazole 20 mg BID), but he did not complete the breath test after finishing therapy.     A week ago refers BMs 10 to 15 times per day, with blood when wiping, on toilet paper only, and amount of stool has decreased. He still taking mesalamine as prescribed, but a month ago, ran out of the medication for at least 4 days, and resumed it a month ago. He says that still have symptoms after resuming the medication.      GI ROS:     Weight Loss: Denies   Dysphagia: Denies   GI bleeding: as above  Diarrhea: as above  Nausea/Vomiting: as above   Bowel habits/Stools: as above  Diet: Regular   Early satiety/early satiation: About the same, has not changes  Use of PPI: No currently  Use of NSAIDs: No currently   Extraintestinal IBD manifestations: inflammation ins soft palate since a week ago, eye abnl, joint pains; skin blisters in b/l thighs have resolved.    Endoscopic Hx:  C-scope 5/2021: - The distal rectum and anal verge are normal on retroflexion view. Four 2 to 3 mm  polyps in the descend ing colon, removed with a cold biopsy forceps. One 3 mm polyp in the ascending colon, removed with a cold biopsy forceps. Resected and retrieved (path: adenoma w/ LGD). Erythematous mucosa at the appendiceal orifice. Biopsied. Regional biopsies obtained in four quadrant fashion from 70cm to 10 cm- at least 32 biopsies obtained in total only visibile abnormalities: pseudopolyps throughout the colon. erythematous and friable Ao. scarring significant from transverse colon to sigmoid (path: no e/o granulomas or dysplasia or active colitis). Tried to get into TI but could not.     EGD July 2023: HP gastritis.     C-scope July 2023: Endoscopically mild inflammation from transverse to rectum, histologically normal entire colon except for chronic quiescent colitis in rectum.    Past medical history:  Past Medical History:   Diagnosis Date    CHF (congestive heart failure) (Multi)     COPD (chronic obstructive pulmonary disease) (Multi)     Crohn's disease (Multi)     Diabetes mellitus (Multi)     Gastro-esophageal reflux disease without esophagitis     HTN (hypertension)     On home oxygen therapy     Opioid use, unspecified, uncomplicated     Heroin use, on methadone therapy    CHARLENE (obstructive sleep apnea)     Pancreatic cyst ([facility])     Personal history of transient ischemic attack (TIA), and cerebral infarction without residual deficits     History of stroke   - COPD/emphysema ? severe, on supplemental O2 at night   - CHF- HFmrEF and most recent echocardiogram 1/27/2021 shows LVEF 45-50%  - Crohns disease   - CHARLENE   - COVID 19 (dec 2020)   - GERD  - lung nodule   - adenomatous colon polyp with low grade dysplasia  - numerous pseudopolyps of colon   - septate pancreatic cyst     Medications:    Current Outpatient Medications:     albuterol (Ventolin HFA) 90 mcg/actuation inhaler, Inhale., Disp: , Rfl:     albuterol 0.63 mg/3 mL nebulizer solution, Take 3 mL (0.63 mg) by nebulization 4 times a day  as needed., Disp: , Rfl:     aspirin 81 mg EC tablet, Take 1 tablet (81 mg) by mouth once daily., Disp: 30 tablet, Rfl: 4    dapagliflozin propanediol (Farxiga) 10 mg, Take 1 tablet (10 mg) by mouth once daily., Disp: 90 tablet, Rfl: 3    fluticasone propion-salmeteroL (Advair Diskus) 500-50 mcg/dose diskus inhaler, Inhale 1 puff 2 times a day., Disp: 1 each, Rfl: 5    gabapentin (Neurontin) 600 mg tablet, Take 1 tablet (600 mg) by mouth. Take 600 mg in the morning, 600 mg midday, and 1200 mg at bedtime, Disp: , Rfl:     ipratropium/albuterol sulfate (COMBIVENT RESPIMAT INHL), Inhale 2 puffs., Disp: , Rfl:     mesalamine (Lialda) 1.2 gram EC tablet, Take 1 tablet (1.2 g) by mouth 2 times a day. Do not crush, chew, or split., Disp: 60 tablet, Rfl: 11    methadone HCl (METHADONE ORAL), Take 60 mL by mouth once daily. Pt states last dose was saturday, Disp: , Rfl:     metoprolol succinate XL (Toprol-XL) 200 mg 24 hr tablet, Take 1 tablet (200 mg) by mouth once daily., Disp: 90 tablet, Rfl: 3    nitroglycerin (Nitrostat) 0.4 mg SL tablet, Place under the tongue. Dissolve 1 tab under the tongue as needed for chest pain every 5 minutes up to 3 times if no relief call 911, Disp: , Rfl:     omeprazole (PriLOSEC) 20 mg DR capsule, TAKE 1 CAPSULE(20 MG) BY MOUTH TWICE DAILY BEFORE MEALS. DO NOT CRUSH, CHEW, OR SPLIT, Disp: 56 capsule, Rfl: 1    pravastatin (Pravachol) 40 mg tablet, Take 1 tablet (40 mg) by mouth once daily at bedtime., Disp: 90 tablet, Rfl: 3    sacubitriL-valsartan (Entresto)  mg tablet, Take 1 tablet by mouth 2 times a day., Disp: 60 tablet, Rfl: 11    spironolactone (Aldactone) 25 mg tablet, Take 1 tablet (25 mg) by mouth once daily., Disp: 90 tablet, Rfl: 3    tamsulosin (Flomax) 0.4 mg 24 hr capsule, TAKE 1 CAPSULE BY MOUTH ONCE DAILY, Disp: 90 capsule, Rfl: 10    tiotropium (Spiriva Respimat) 2.5 mcg/actuation inhaler, Inhale 2 puffs once daily., Disp: 4 g, Rfl: 1    ferrous sulfate 325 (65 Fe) MG  tablet, Take 1 tablet by mouth once daily., Disp: , Rfl:     fluticasone propion-salmeteroL (Advair Diskus) 500-50 mcg/dose diskus inhaler, Inhale 1 puff 2 times a day. Rinse mouth with water after use to reduce aftertaste and incidence of candidiasis. Do not swallow. (Patient not taking: Reported on 7/1/2024), Disp: 1 each, Rfl: 3    furosemide (Lasix) 20 mg tablet, Take 1 tablet (20 mg) by mouth once daily as needed (for weight gain of more than 3 lbs a day). (Patient not taking: Reported on 7/9/2024), Disp: 30 tablet, Rfl: 11    levocetirizine (Xyzal) 5 mg tablet, Take by mouth once daily., Disp: , Rfl:     Allergies:  Allergies   Allergen Reactions    Shellfish Containing Products Anaphylaxis    Ibuprofen Hives       Surgical history:  Past Surgical History:   Procedure Laterality Date    COLONOSCOPY      ESOPHAGOGASTRODUODENOSCOPY      KNEE ARTHROSCOPY W/ MENISCAL REPAIR         Family history:  Family History   Problem Relation Name Age of Onset    Hypertension Mother      Heart disease Mother      Heart disease Brother      Hypertension Sibling         Denies family history of gastrointestinal cancers    Social history:  Reports that he quit smoking about 21 years ago. His smoking use included cigarettes. He started smoking about 41 years ago. He has a 20 pack-year smoking history. He has never been exposed to tobacco smoke. He has never used smokeless tobacco. He reports that he does not currently use alcohol. He reports that he does not currently use drugs after having used the following drugs: Heroin, quit in 2014.    Social History     Social History Narrative    Not on file       1. Living situation:   2. Work:   3. Alcohol:   4. Tobacco: Denies  5. Other drugs: Denies   6. Diet/exercise:       Vitals:  Vitals:    07/09/24 0813   BP: 129/86   Pulse: 89   Temp: 36.6 °C (97.8 °F)   SpO2: 90%       Physical exam:  Constitutional: Well-developed male in no acute distress.  HEENT: NC/AT, sclera  anicteric  Respiratory: CTAB. No wheezes, rales, or rhonchi. Normal respiratory effort.  Cardiovascular: RRR. No murmurs, gallops, or rubs.  Abdominal: Soft, nondistended, nontender to palpation. Bowel sounds present. No hepatosplenomegaly or masses.   Neuro: AAOx3. CN II-XII grossly intact. Tongue midline, no facial droop  MSK: No LE edema bilaterally. Moving extremities well  Skin: Warm, dry. No rashes or wounds.  Psych: Appropriate mood and affect.    Labs:  No results found for this or any previous visit (from the past 24 hour(s)).    Labs Feb 2024: Hb 15.8, plt 219, LFTs WNL, Cr 0.96     Labs Aug-Nov 2023: Hb 14.9, plt 230, CA 19-9 elevated at 49, H.pylori breath test pos in Sept 2023, ferritin 79, TIBC% 21%, Cr 1.03, LFTs WNL     Labs May 2023: Hb 13.6, plt 223, CRP 0.23, Fecal calpro 111,      Labs April 2023: Hb 11.5, plt 278, ferritin 92, TIBC% 11% Cr 1.15, LFTs WNL, July 2021: CRP WNL, June 2021: fecal calpro 1680    Imaging:  XR chest 1 view    Result Date: 6/25/2024  * * *Final Report* * * DATE OF EXAM: Jun 25 2024  7:55PM   EUX   [id number]  -  XR CHEST 1V FRONTAL PORT  / ACCESSION #  [id number] PROCEDURE REASON: Shortness of breath      * * * * Physician Interpretation * * * * RESULT: EXAMINATION:  CHEST RADIOGRAPH (PORTABLE SINGLE VIEW AP) Exam Date/Time:  6/25/2024 7:55 PM CLINICAL HISTORY: Shortness of breath MQ:  XCPR_5 Comparison:  11/26/2022, and others RESULT: Lines, tubes, and devices:  None. Lungs and pleura:  Hyperinflated lungs with apical bullous changes. Chronic appearing basilar interstitial changes Cardiomediastinal silhouette:  Normal cardiomediastinal silhouette. Other:  No acute bony abnormalities.    IMPRESSION: Persistent and MAS emphysematous changes. Mild basilar interstitial accentuation, likely chronic. Transcribed Using Voice Recognition Transcribe Date/Time: Jun 25 2024  8:12P Dictated by: MORRO DUTTON MD This examination was interpreted and the report reviewed and electronically  signed by: MORRO DUTTON MD on Jun 25 2024  8:13PM  EST    CTE 6/2021: No evidence of acute inflammatory change of the large or small bowel. The terminal ileum appears normal. No stricture, obstruction, fistula, or abscess. Prominent colonic stool noted, correlate for constipation.    Assessment and Plan:  Arnaldo Francisco is a 64 y.o. male with     Cronh's Colitis   excellent biochemical (CRP, fecal calpro down to 111) and endoscopic response to lialda w/ mild inflammation from transverse to rectum, histologically normal entire colon except for chronic quiescent colitis in rectum. CTE 6/2021 w/o any vicki-anal disease or small or large bowel inflammation. Overall picture c/w colitis (favor Crohn's, vs UC) with good clinical, biochemical and endoscopic response to lialda, which is currently taking.   --Currently with symptoms that warrant to rule out infectious etiology (especially C. Diff after antibiotics) vs Crohn's colitis flare.    -- Continue same dose of Lialda    Pancreas cyst (IPMN)  - Known pancreatic cyst, s/p EUS FNA in Dec 2023- no high risk features, cytology neg for malignant cells, c/w mucinous cyst, surgery plans to c/w annual MRI/MRCP surveillance.  -- Continue Follow up with surgery for pancreatic MR-- Nov 2024    H. Pylori infection   - He saw Dr. Ferraro in Feb 2024 for resistant HP and he recommended Quadruple therapy with the following regimen for 28 days: Amoxicillin 1 gm BID, Tetracycline 500 mg QID, Metronidazole 500 mg QID and Omeprazole 20 mg BID. Active issues include resistant HP.  -- He completed 28 days of last quadruple therapy suggested by ID. We will order Breath test to confirm eradication.    Plan:  -continue lialda 2.4 g daily  - please obtain stool tests today to rule out infection and inflammation  -You are status post antibiotic therapy for H.pylori gastritis, we need to confirm eradication, we have ordered H.pylori stool antigen test to be done in the lab  -continue follow  up with general surgery for annual MRI/MRCP surveillance of pancreatic cyst  -repeat colonoscopy in 2026 for endoscopic disease activity assessment of Crohn's colitis    We will see you back in 4-6 months     Health maintenance in IBD: reference for PCP     -Vaccinations: counseled that it is recommended to get COVID-19 (including boosters), inactivated flu, pneumococcal, zoster (shingrix) vaccine, RSV vaccine.     -Osteoporosis screening by DEXA scan in patients with IBD if any risk factors for osteoporosis (low BMI, >3 mths cumulative steroid exposure, post-menopausal,hypogonadism). Repeat in 5 yrs if initial screen is normal--->>does not apply to this patient currently     -Screen all patients with IBD for depression (PHQ9) and anxiety (GAD7) at baseline and annually---->>pt denies s/s of depression     Patient and plan discussed with attending physician Dr. Lexie Zelaya.

## 2024-07-09 ENCOUNTER — OFFICE VISIT (OUTPATIENT)
Dept: GASTROENTEROLOGY | Facility: HOSPITAL | Age: 64
End: 2024-07-09
Payer: MEDICAID

## 2024-07-09 ENCOUNTER — LAB (OUTPATIENT)
Dept: LAB | Facility: LAB | Age: 64
End: 2024-07-09
Payer: MEDICAID

## 2024-07-09 VITALS
BODY MASS INDEX: 27.16 KG/M2 | HEART RATE: 89 BPM | TEMPERATURE: 97.8 F | DIASTOLIC BLOOD PRESSURE: 86 MMHG | SYSTOLIC BLOOD PRESSURE: 129 MMHG | OXYGEN SATURATION: 90 % | WEIGHT: 194 LBS | HEIGHT: 71 IN

## 2024-07-09 DIAGNOSIS — B96.81 GASTRITIS, HELICOBACTER PYLORI: ICD-10-CM

## 2024-07-09 DIAGNOSIS — K29.70 GASTRITIS, HELICOBACTER PYLORI: ICD-10-CM

## 2024-07-09 DIAGNOSIS — K50.10 CROHN'S DISEASE OF LARGE INTESTINE WITHOUT COMPLICATION (MULTI): Primary | ICD-10-CM

## 2024-07-09 DIAGNOSIS — K52.9 COLITIS: ICD-10-CM

## 2024-07-09 DIAGNOSIS — I50.22 CHRONIC SYSTOLIC CONGESTIVE HEART FAILURE (MULTI): ICD-10-CM

## 2024-07-09 LAB
ALBUMIN SERPL BCP-MCNC: 3.8 G/DL (ref 3.4–5)
ALP SERPL-CCNC: 91 U/L (ref 33–136)
ALT SERPL W P-5'-P-CCNC: 17 U/L (ref 10–52)
ANION GAP SERPL CALC-SCNC: 12 MMOL/L (ref 10–20)
AST SERPL W P-5'-P-CCNC: 18 U/L (ref 9–39)
BILIRUB SERPL-MCNC: 0.9 MG/DL (ref 0–1.2)
BNP SERPL-MCNC: 43 PG/ML (ref 0–99)
BUN SERPL-MCNC: 15 MG/DL (ref 6–23)
CALCIUM SERPL-MCNC: 9.4 MG/DL (ref 8.6–10.6)
CHLORIDE SERPL-SCNC: 104 MMOL/L (ref 98–107)
CO2 SERPL-SCNC: 29 MMOL/L (ref 21–32)
CREAT SERPL-MCNC: 0.99 MG/DL (ref 0.5–1.3)
EGFRCR SERPLBLD CKD-EPI 2021: 86 ML/MIN/1.73M*2
ERYTHROCYTE [DISTWIDTH] IN BLOOD BY AUTOMATED COUNT: 14.6 % (ref 11.5–14.5)
FERRITIN SERPL-MCNC: 113 NG/ML (ref 20–300)
GLUCOSE SERPL-MCNC: 98 MG/DL (ref 74–99)
HCT VFR BLD AUTO: 48.7 % (ref 41–52)
HGB BLD-MCNC: 15.3 G/DL (ref 13.5–17.5)
IRON SATN MFR SERPL: 19 % (ref 25–45)
IRON SERPL-MCNC: 68 UG/DL (ref 35–150)
MCH RBC QN AUTO: 27.2 PG (ref 26–34)
MCHC RBC AUTO-ENTMCNC: 31.4 G/DL (ref 32–36)
MCV RBC AUTO: 87 FL (ref 80–100)
NRBC BLD-RTO: 0 /100 WBCS (ref 0–0)
PLATELET # BLD AUTO: 247 X10*3/UL (ref 150–450)
POTASSIUM SERPL-SCNC: 4.6 MMOL/L (ref 3.5–5.3)
PROT SERPL-MCNC: 6.4 G/DL (ref 6.4–8.2)
RBC # BLD AUTO: 5.63 X10*6/UL (ref 4.5–5.9)
SODIUM SERPL-SCNC: 140 MMOL/L (ref 136–145)
TIBC SERPL-MCNC: 366 UG/DL (ref 240–445)
UIBC SERPL-MCNC: 298 UG/DL (ref 110–370)
WBC # BLD AUTO: 7 X10*3/UL (ref 4.4–11.3)

## 2024-07-09 PROCEDURE — 1036F TOBACCO NON-USER: CPT | Performed by: STUDENT IN AN ORGANIZED HEALTH CARE EDUCATION/TRAINING PROGRAM

## 2024-07-09 PROCEDURE — 83540 ASSAY OF IRON: CPT

## 2024-07-09 PROCEDURE — 83550 IRON BINDING TEST: CPT

## 2024-07-09 PROCEDURE — 99215 OFFICE O/P EST HI 40 MIN: CPT | Performed by: STUDENT IN AN ORGANIZED HEALTH CARE EDUCATION/TRAINING PROGRAM

## 2024-07-09 PROCEDURE — 82728 ASSAY OF FERRITIN: CPT

## 2024-07-09 PROCEDURE — 85027 COMPLETE CBC AUTOMATED: CPT

## 2024-07-09 PROCEDURE — 80053 COMPREHEN METABOLIC PANEL: CPT

## 2024-07-09 PROCEDURE — 3079F DIAST BP 80-89 MM HG: CPT | Performed by: STUDENT IN AN ORGANIZED HEALTH CARE EDUCATION/TRAINING PROGRAM

## 2024-07-09 PROCEDURE — 83880 ASSAY OF NATRIURETIC PEPTIDE: CPT

## 2024-07-09 PROCEDURE — 36415 COLL VENOUS BLD VENIPUNCTURE: CPT

## 2024-07-09 PROCEDURE — 3074F SYST BP LT 130 MM HG: CPT | Performed by: STUDENT IN AN ORGANIZED HEALTH CARE EDUCATION/TRAINING PROGRAM

## 2024-07-09 RX ORDER — MESALAMINE 1.2 G/1
1200 TABLET, DELAYED RELEASE ORAL 2 TIMES DAILY
Qty: 60 TABLET | Refills: 11 | Status: SHIPPED | OUTPATIENT
Start: 2024-07-09 | End: 2025-07-09

## 2024-07-09 ASSESSMENT — PAIN SCALES - GENERAL: PAINLEVEL: 0-NO PAIN

## 2024-07-09 NOTE — PATIENT INSTRUCTIONS
Thank you for coming to clinic today. We will:    -continue lialda 2.4 g daily  - please obtain stool tests today to rule out infection and inflammation  -You are status post antibiotic therapy for H.pylori gastritis, we need to confirm eradication, we have ordered H.pylori stool antigen test to be done in the lab  -continue follow up with general surgery for annual MRI/MRCP surveillance of pancreatic cyst  -repeat colonoscopy in 2026 for endoscopic disease activity assessment of Crohn's colitis    We will see you back in 4-6 months

## 2024-07-15 ENCOUNTER — LAB (OUTPATIENT)
Dept: LAB | Facility: LAB | Age: 64
End: 2024-07-15
Payer: MEDICAID

## 2024-07-15 DIAGNOSIS — K50.10 CROHN'S DISEASE OF LARGE INTESTINE WITHOUT COMPLICATION (MULTI): ICD-10-CM

## 2024-07-15 DIAGNOSIS — B96.81 GASTRITIS, HELICOBACTER PYLORI: ICD-10-CM

## 2024-07-15 DIAGNOSIS — K29.70 GASTRITIS, HELICOBACTER PYLORI: ICD-10-CM

## 2024-07-15 PROCEDURE — 87506 IADNA-DNA/RNA PROBE TQ 6-11: CPT

## 2024-07-15 PROCEDURE — 87449 NOS EACH ORGANISM AG IA: CPT

## 2024-07-15 PROCEDURE — 83993 ASSAY FOR CALPROTECTIN FECAL: CPT

## 2024-07-15 PROCEDURE — 87328 CRYPTOSPORIDIUM AG IA: CPT

## 2024-07-15 PROCEDURE — 87329 GIARDIA AG IA: CPT

## 2024-07-16 DIAGNOSIS — I50.22 CHRONIC SYSTOLIC CONGESTIVE HEART FAILURE (MULTI): ICD-10-CM

## 2024-07-16 DIAGNOSIS — J43.9 PULMONARY EMPHYSEMA, UNSPECIFIED EMPHYSEMA TYPE (MULTI): ICD-10-CM

## 2024-07-16 LAB

## 2024-07-16 RX ORDER — ASPIRIN 81 MG/1
81 TABLET ORAL
Qty: 30 TABLET | Refills: 1 | Status: SHIPPED | OUTPATIENT
Start: 2024-07-16

## 2024-07-16 RX ORDER — TIOTROPIUM BROMIDE INHALATION SPRAY 3.12 UG/1
2 SPRAY, METERED RESPIRATORY (INHALATION) DAILY
Qty: 4 G | Refills: 1 | Status: SHIPPED | OUTPATIENT
Start: 2024-07-16

## 2024-07-17 LAB — H PYLORI AG STL QL IA: NEGATIVE

## 2024-07-18 ENCOUNTER — TELEPHONE (OUTPATIENT)
Dept: GASTROENTEROLOGY | Facility: HOSPITAL | Age: 64
End: 2024-07-18
Payer: MEDICAID

## 2024-07-19 ENCOUNTER — TELEPHONE (OUTPATIENT)
Dept: GASTROENTEROLOGY | Facility: HOSPITAL | Age: 64
End: 2024-07-19
Payer: MEDICAID

## 2024-07-19 DIAGNOSIS — K50.10 CROHN'S DISEASE OF LARGE INTESTINE WITHOUT COMPLICATION (MULTI): Primary | ICD-10-CM

## 2024-07-19 DIAGNOSIS — K52.9 COLITIS: ICD-10-CM

## 2024-07-19 LAB — O+P STL MICRO: NEGATIVE

## 2024-07-20 LAB — CALPROTECTIN STL-MCNT: 1350 UG/G

## 2024-07-23 DIAGNOSIS — K50.10 CROHN'S DISEASE OF LARGE INTESTINE WITHOUT COMPLICATION (MULTI): Primary | ICD-10-CM

## 2024-07-23 RX ORDER — POLYETHYLENE GLYCOL 3350, SODIUM CHLORIDE, SODIUM BICARBONATE, POTASSIUM CHLORIDE 420; 11.2; 5.72; 1.48 G/4L; G/4L; G/4L; G/4L
4000 POWDER, FOR SOLUTION ORAL ONCE
Qty: 4000 ML | Refills: 0 | Status: SHIPPED | OUTPATIENT
Start: 2024-07-23 | End: 2024-07-23

## 2024-07-23 NOTE — TELEPHONE ENCOUNTER
Pt was called- I spoke to him and his wife- explained to them that stool work up for infectionw as negative but fecal calpro was elevated to 1350- he endorsed diarrhea 5-6 loose brown Bms w/ intermittent BRBPR as well for 2 weeks- I have prescribed entocort and ordered colonoscopy for disease activity assessment.

## 2024-07-24 ENCOUNTER — APPOINTMENT (OUTPATIENT)
Dept: GASTROENTEROLOGY | Facility: CLINIC | Age: 64
End: 2024-07-24
Payer: MEDICAID

## 2024-07-24 DIAGNOSIS — B96.81 GASTRITIS DUE TO HELICOBACTER SPECIES: Primary | ICD-10-CM

## 2024-07-24 DIAGNOSIS — K29.70 GASTRITIS DUE TO HELICOBACTER SPECIES: Primary | ICD-10-CM

## 2024-07-30 DIAGNOSIS — Z12.11 ENCOUNTER FOR SCREENING COLONOSCOPY: Primary | ICD-10-CM

## 2024-07-30 RX ORDER — POLYETHYLENE GLYCOL 3350, SODIUM CHLORIDE, SODIUM BICARBONATE, POTASSIUM CHLORIDE 420; 11.2; 5.72; 1.48 G/4L; G/4L; G/4L; G/4L
4000 POWDER, FOR SOLUTION ORAL ONCE
Qty: 4000 ML | Refills: 0 | Status: SHIPPED | OUTPATIENT
Start: 2024-07-30 | End: 2024-07-30

## 2024-08-15 ENCOUNTER — TELEPHONE (OUTPATIENT)
Dept: GASTROENTEROLOGY | Facility: HOSPITAL | Age: 64
End: 2024-08-15
Payer: MEDICAID

## 2024-08-16 ENCOUNTER — HOSPITAL ENCOUNTER (OUTPATIENT)
Dept: GASTROENTEROLOGY | Facility: HOSPITAL | Age: 64
Setting detail: OUTPATIENT SURGERY
Discharge: HOME | End: 2024-08-16
Payer: MEDICAID

## 2024-08-16 VITALS
DIASTOLIC BLOOD PRESSURE: 93 MMHG | WEIGHT: 193 LBS | BODY MASS INDEX: 27.02 KG/M2 | HEART RATE: 95 BPM | SYSTOLIC BLOOD PRESSURE: 129 MMHG | RESPIRATION RATE: 22 BRPM | TEMPERATURE: 96.8 F | HEIGHT: 71 IN | OXYGEN SATURATION: 93 %

## 2024-08-16 DIAGNOSIS — K50.10 CROHN'S DISEASE OF LARGE INTESTINE WITHOUT COMPLICATION (MULTI): ICD-10-CM

## 2024-08-16 DIAGNOSIS — K52.9 COLITIS: Primary | ICD-10-CM

## 2024-08-16 DIAGNOSIS — K51.00 ULCERATIVE PANCOLITIS WITHOUT COMPLICATION (MULTI): Primary | ICD-10-CM

## 2024-08-16 PROCEDURE — 2500000004 HC RX 250 GENERAL PHARMACY W/ HCPCS (ALT 636 FOR OP/ED): Mod: SE | Performed by: INTERNAL MEDICINE

## 2024-08-16 PROCEDURE — 45380 COLONOSCOPY AND BIOPSY: CPT | Performed by: INTERNAL MEDICINE

## 2024-08-16 PROCEDURE — 7100000010 HC PHASE TWO TIME - EACH INCREMENTAL 1 MINUTE

## 2024-08-16 PROCEDURE — 99153 MOD SED SAME PHYS/QHP EA: CPT | Performed by: INTERNAL MEDICINE

## 2024-08-16 PROCEDURE — 3700000013 HC SEDATION LEVEL 5+ TIME - EACH ADDITIONAL 15 MINUTES

## 2024-08-16 PROCEDURE — 7100000009 HC PHASE TWO TIME - INITIAL BASE CHARGE

## 2024-08-16 PROCEDURE — 99152 MOD SED SAME PHYS/QHP 5/>YRS: CPT | Performed by: INTERNAL MEDICINE

## 2024-08-16 PROCEDURE — 3700000012 HC SEDATION LEVEL 5+ TIME - INITIAL 15 MINUTES 5/> YEARS

## 2024-08-16 RX ORDER — FENTANYL CITRATE 50 UG/ML
INJECTION, SOLUTION INTRAMUSCULAR; INTRAVENOUS AS NEEDED
Status: COMPLETED | OUTPATIENT
Start: 2024-08-16 | End: 2024-08-16

## 2024-08-16 RX ORDER — PREDNISONE 10 MG/1
TABLET ORAL
Qty: 74 TABLET | Refills: 0 | Status: SHIPPED | OUTPATIENT
Start: 2024-08-16 | End: 2024-09-20

## 2024-08-16 RX ORDER — MIDAZOLAM HYDROCHLORIDE 1 MG/ML
INJECTION, SOLUTION INTRAMUSCULAR; INTRAVENOUS AS NEEDED
Status: COMPLETED | OUTPATIENT
Start: 2024-08-16 | End: 2024-08-16

## 2024-08-16 ASSESSMENT — ENCOUNTER SYMPTOMS
ABDOMINAL PAIN: 0
COUGH: 0
WHEEZING: 0
TROUBLE SWALLOWING: 0
SHORTNESS OF BREATH: 0
ABDOMINAL DISTENTION: 0
SPEECH DIFFICULTY: 0
SLEEP DISTURBANCE: 0
NAUSEA: 0
VOMITING: 0
FEVER: 0
CONFUSION: 0
UNEXPECTED WEIGHT CHANGE: 0
LIGHT-HEADEDNESS: 0
DIARRHEA: 1
JOINT SWELLING: 0
CONSTIPATION: 0
HEADACHES: 0
COLOR CHANGE: 0
ARTHRALGIAS: 0
DIZZINESS: 0
CHILLS: 0
DIFFICULTY URINATING: 0

## 2024-08-16 ASSESSMENT — PAIN SCALES - GENERAL
PAINLEVEL_OUTOF10: 0 - NO PAIN
PAINLEVEL_OUTOF10: 2
PAINLEVEL_OUTOF10: 0 - NO PAIN
PAINLEVEL_OUTOF10: 0 - NO PAIN
PAINLEVEL_OUTOF10: 2
PAINLEVEL_OUTOF10: 0 - NO PAIN
PAINLEVEL_OUTOF10: 4
PAINLEVEL_OUTOF10: 0 - NO PAIN
PAINLEVEL_OUTOF10: 0 - NO PAIN
PAINLEVEL_OUTOF10: 4
PAINLEVEL_OUTOF10: 0 - NO PAIN

## 2024-08-16 ASSESSMENT — PAIN - FUNCTIONAL ASSESSMENT

## 2024-08-16 ASSESSMENT — COLUMBIA-SUICIDE SEVERITY RATING SCALE - C-SSRS
1. IN THE PAST MONTH, HAVE YOU WISHED YOU WERE DEAD OR WISHED YOU COULD GO TO SLEEP AND NOT WAKE UP?: NO
2. HAVE YOU ACTUALLY HAD ANY THOUGHTS OF KILLING YOURSELF?: NO
6. HAVE YOU EVER DONE ANYTHING, STARTED TO DO ANYTHING, OR PREPARED TO DO ANYTHING TO END YOUR LIFE?: NO

## 2024-08-16 NOTE — PROGRESS NOTES
Colonosocopy w/ worsened L sided colitis- likely Nagel 2 and cecal patch- suspect L sided moderate UC w/ cecal pathc vs Crohn's L sided colitis (unlikely)- given elevated fecal calpro to 1350 as well as worsened diarrhea, we will apply for entyvio - discussed advanced therapy w/ pt and wife- they would prefer entyvio given favorable AE profile which is reasonable. He will stop budesonide which has not helped, he will start prednisone 5 week taper while awaiting entyvio approval.

## 2024-08-16 NOTE — H&P
History Of Present Illness  Arnaldo Francisco is a 64 y.o. 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 Here for diagnostic colonoscopy for CD disease activity assessment.      Past Medical History  Past Medical History:   Diagnosis Date    CHF (congestive heart failure) (Multi)     COPD (chronic obstructive pulmonary disease) (Multi)     Crohn's disease (Multi)     Diabetes mellitus (Multi)     Gastro-esophageal reflux disease without esophagitis     HTN (hypertension)     On home oxygen therapy     Opioid use, unspecified, uncomplicated     Heroin use, on methadone therapy    CHARLENE (obstructive sleep apnea)     Pancreatic cyst ([facility]CC)     Personal history of transient ischemic attack (TIA), and cerebral infarction without residual deficits     History of stroke     Surgical History  Past Surgical History:   Procedure Laterality Date    COLONOSCOPY      ESOPHAGOGASTRODUODENOSCOPY      KNEE ARTHROSCOPY W/ MENISCAL REPAIR       Social History  He reports that he quit smoking about 21 years ago. His smoking use included cigarettes. He started smoking about 41 years ago. He has a 20 pack-year smoking history. He has never been exposed to tobacco smoke. He has never used smokeless tobacco. He reports that he does not currently use alcohol. He reports that he does not currently use drugs after having used the following drugs: Heroin.    Family History  Family History   Problem Relation Name Age of Onset    Hypertension Mother      Heart disease Mother      Heart disease Brother      Hypertension Sibling          Allergies  Allergies   Allergen Reactions    Shellfish Containing Products Anaphylaxis    Ibuprofen Hives     Review of Systems   Constitutional:  Negative for chills, fever and unexpected weight change.   HENT:  Negative for congestion and trouble  swallowing.    Respiratory:  Negative for cough, shortness of breath and wheezing.    Cardiovascular:  Negative for chest pain.   Gastrointestinal:  Positive for diarrhea. Negative for abdominal distention, abdominal pain, constipation, nausea and vomiting.   Genitourinary:  Negative for difficulty urinating.   Musculoskeletal:  Negative for arthralgias and joint swelling.   Skin:  Negative for color change.   Neurological:  Negative for dizziness, speech difficulty, light-headedness and headaches.   Psychiatric/Behavioral:  Negative for confusion and sleep disturbance.      Pre-sedation Evaluation:  ASA Classification - ASA 3 - Patient with moderate systemic disease with functional limitations  Mallampati Score - II (hard and soft palate, upper portion of tonsils and uvula visible)    Physical Exam  Constitutional:       General: He is awake.      Appearance: Normal appearance.   HENT:      Head: Normocephalic and atraumatic.      Nose: Nose normal.      Mouth/Throat:      Mouth: Mucous membranes are moist.   Eyes:      Pupils: Pupils are equal, round, and reactive to light.   Neck:      Thyroid: No thyroid mass.      Trachea: Phonation normal.   Cardiovascular:      Rate and Rhythm: Normal rate and regular rhythm.      Heart sounds: Normal heart sounds. No murmur heard.     No gallop.   Pulmonary:      Effort: Pulmonary effort is normal. No respiratory distress.      Breath sounds: Normal air entry. No decreased breath sounds, wheezing, rhonchi or rales.   Abdominal:      General: Bowel sounds are normal. There is no distension.      Palpations: Abdomen is soft.      Tenderness: There is no abdominal tenderness.   Musculoskeletal:      Cervical back: Neck supple.      Right lower leg: No edema.      Left lower leg: No edema.   Skin:     General: Skin is warm.      Capillary Refill: Capillary refill takes less than 2 seconds.   Neurological:      General: No focal deficit present.      Mental Status: He is alert  and oriented to person, place, and time. Mental status is at baseline.      Cranial Nerves: Cranial nerves 2-12 are intact.      Motor: Motor function is intact.   Psychiatric:         Attention and Perception: Attention and perception normal.         Mood and Affect: Mood normal.         Speech: Speech normal.         Behavior: Behavior normal.          Last Recorded Vitals  There were no vitals taken for this visit.     Assessment/Plan   Fecal Calprotectin 1350  diagnostic colonoscopy for CD disease activity assessment.      PTA/Current Medications:  (Not in a hospital admission)    Current Outpatient Medications   Medication Sig Dispense Refill    albuterol (Ventolin HFA) 90 mcg/actuation inhaler Inhale.      albuterol 0.63 mg/3 mL nebulizer solution Take 3 mL (0.63 mg) by nebulization 4 times a day as needed.      aspirin 81 mg EC tablet Take 1 tablet (81 mg) by mouth once daily. 30 tablet 1    dapagliflozin propanediol (Farxiga) 10 mg Take 1 tablet (10 mg) by mouth once daily. 90 tablet 3    ferrous sulfate 325 (65 Fe) MG tablet Take 1 tablet by mouth once daily.      fluticasone propion-salmeteroL (Advair Diskus) 500-50 mcg/dose diskus inhaler Inhale 1 puff 2 times a day. Rinse mouth with water after use to reduce aftertaste and incidence of candidiasis. Do not swallow. (Patient not taking: Reported on 7/1/2024) 1 each 3    fluticasone propion-salmeteroL (Advair Diskus) 500-50 mcg/dose diskus inhaler Inhale 1 puff 2 times a day. 1 each 5    furosemide (Lasix) 20 mg tablet Take 1 tablet (20 mg) by mouth once daily as needed (for weight gain of more than 3 lbs a day). (Patient not taking: Reported on 7/9/2024) 30 tablet 11    gabapentin (Neurontin) 600 mg tablet Take 1 tablet (600 mg) by mouth. Take 600 mg in the morning, 600 mg midday, and 1200 mg at bedtime      ipratropium/albuterol sulfate (COMBIVENT RESPIMAT INHL) Inhale 2 puffs.      levocetirizine (Xyzal) 5 mg tablet Take by mouth once daily.       mesalamine (Lialda) 1.2 gram EC tablet Take 1 tablet (1.2 g) by mouth 2 times a day. Do not crush, chew, or split. 60 tablet 11    methadone HCl (METHADONE ORAL) Take 60 mL by mouth once daily. Pt states last dose was saturday      metoprolol succinate XL (Toprol-XL) 200 mg 24 hr tablet Take 1 tablet (200 mg) by mouth once daily. 90 tablet 3    nitroglycerin (Nitrostat) 0.4 mg SL tablet Place under the tongue. Dissolve 1 tab under the tongue as needed for chest pain every 5 minutes up to 3 times if no relief call 911      omeprazole (PriLOSEC) 20 mg DR capsule TAKE 1 CAPSULE(20 MG) BY MOUTH TWICE DAILY BEFORE MEALS. DO NOT CRUSH, CHEW, OR SPLIT 56 capsule 1    pravastatin (Pravachol) 40 mg tablet Take 1 tablet (40 mg) by mouth once daily at bedtime. 90 tablet 3    sacubitriL-valsartan (Entresto)  mg tablet Take 1 tablet by mouth 2 times a day. 60 tablet 11    spironolactone (Aldactone) 25 mg tablet Take 1 tablet (25 mg) by mouth once daily. 90 tablet 3    tamsulosin (Flomax) 0.4 mg 24 hr capsule TAKE 1 CAPSULE BY MOUTH ONCE DAILY 90 capsule 10    tiotropium (Spiriva Respimat) 2.5 mcg/actuation inhaler Inhale 2 puffs once daily. 4 g 1     No current facility-administered medications for this encounter.     Leslie López MD

## 2024-08-19 DIAGNOSIS — K50.819 CROHN'S DISEASE OF SMALL AND LARGE INTESTINES WITH COMPLICATION (MULTI): Primary | ICD-10-CM

## 2024-08-19 RX ORDER — FAMOTIDINE 10 MG/ML
20 INJECTION INTRAVENOUS ONCE AS NEEDED
OUTPATIENT
Start: 2024-08-19

## 2024-08-19 RX ORDER — DIPHENHYDRAMINE HYDROCHLORIDE 50 MG/ML
50 INJECTION INTRAMUSCULAR; INTRAVENOUS AS NEEDED
OUTPATIENT
Start: 2024-08-19

## 2024-08-19 RX ORDER — ACETAMINOPHEN 325 MG/1
650 TABLET ORAL ONCE
OUTPATIENT
Start: 2024-08-19

## 2024-08-19 RX ORDER — EPINEPHRINE 0.3 MG/.3ML
0.3 INJECTION SUBCUTANEOUS EVERY 5 MIN PRN
OUTPATIENT
Start: 2024-08-19

## 2024-08-19 RX ORDER — ALBUTEROL SULFATE 0.83 MG/ML
3 SOLUTION RESPIRATORY (INHALATION) AS NEEDED
OUTPATIENT
Start: 2024-08-19

## 2024-08-20 DIAGNOSIS — K50.819 CROHN'S DISEASE OF SMALL AND LARGE INTESTINES WITH COMPLICATION (MULTI): Primary | ICD-10-CM

## 2024-08-20 NOTE — ADDENDUM NOTE
Encounter addended by: Mesha Leonard RN on: 8/20/2024 4:03 PM   Actions taken: Contacts section saved, Flowsheet accepted

## 2024-08-28 LAB
LABORATORY COMMENT REPORT: NORMAL
PATH REPORT.FINAL DX SPEC: NORMAL
PATH REPORT.GROSS SPEC: NORMAL
PATH REPORT.TOTAL CANCER: NORMAL

## 2024-08-29 ENCOUNTER — DOCUMENTATION (OUTPATIENT)
Dept: INFUSION THERAPY | Facility: CLINIC | Age: 64
End: 2024-08-29
Payer: MEDICAID

## 2024-08-29 DIAGNOSIS — K50.90 CROHN'S DISEASE WITHOUT COMPLICATION, UNSPECIFIED GASTROINTESTINAL TRACT LOCATION (MULTI): Primary | ICD-10-CM

## 2024-08-29 DIAGNOSIS — K50.819 CROHN'S DISEASE OF SMALL AND LARGE INTESTINES WITH COMPLICATION (MULTI): Primary | ICD-10-CM

## 2024-08-29 RX ORDER — ACETAMINOPHEN 325 MG/1
650 TABLET ORAL ONCE
OUTPATIENT
Start: 2024-08-29

## 2024-08-29 RX ORDER — FAMOTIDINE 10 MG/ML
20 INJECTION INTRAVENOUS ONCE AS NEEDED
OUTPATIENT
Start: 2024-08-29

## 2024-08-29 RX ORDER — ALBUTEROL SULFATE 0.83 MG/ML
3 SOLUTION RESPIRATORY (INHALATION) AS NEEDED
OUTPATIENT
Start: 2024-08-29

## 2024-08-29 RX ORDER — DIPHENHYDRAMINE HYDROCHLORIDE 50 MG/ML
50 INJECTION INTRAMUSCULAR; INTRAVENOUS AS NEEDED
OUTPATIENT
Start: 2024-08-29

## 2024-08-29 RX ORDER — EPINEPHRINE 0.3 MG/.3ML
0.3 INJECTION SUBCUTANEOUS EVERY 5 MIN PRN
OUTPATIENT
Start: 2024-08-29

## 2024-08-29 NOTE — PROGRESS NOTES
"CLINICAL CLEARANCE FOR OUTPATIENT INFUSION      Patient to be scheduled for New Start of Entyvio infusions.    For Diagnosis: Crohn's    Dosing is 300mg on weeks ( 0, 2, 6 (induction)) and then Every PENDING days (maintenance)    Labs…  T-Spot drawn/results: No results found for: \"TBSIN\", \"TBGRES\", \"QFG\", \"TSPOTR\" - WILL NEED DRAWN PRIOR TO FIRST INFUSION    LFT (baseline):   Lab Results   Component Value Date    ALT 17 07/09/2024    AST 18 07/09/2024    ALKPHOS 91 07/09/2024    BILITOT 0.9 07/09/2024        Last infusion received: NA (if continuation)   Due: ANYTIME     Induction and Maintenance Therapy Plans entered if needed? No (if no prescribing provider contacted regarding entering maintenance plan as needed)    Okay to schedule treatment as ordered per prescribing provider..   "

## 2024-09-04 ENCOUNTER — HOSPITAL ENCOUNTER (EMERGENCY)
Facility: HOSPITAL | Age: 64
Discharge: HOME | End: 2024-09-04
Attending: EMERGENCY MEDICINE
Payer: MEDICAID

## 2024-09-04 ENCOUNTER — APPOINTMENT (OUTPATIENT)
Dept: RADIOLOGY | Facility: HOSPITAL | Age: 64
End: 2024-09-04
Payer: MEDICAID

## 2024-09-04 VITALS
HEART RATE: 87 BPM | WEIGHT: 194 LBS | TEMPERATURE: 97.9 F | RESPIRATION RATE: 16 BRPM | BODY MASS INDEX: 27.16 KG/M2 | DIASTOLIC BLOOD PRESSURE: 90 MMHG | HEIGHT: 71 IN | OXYGEN SATURATION: 95 % | SYSTOLIC BLOOD PRESSURE: 118 MMHG

## 2024-09-04 DIAGNOSIS — K51.919 ULCERATIVE COLITIS WITH COMPLICATION, UNSPECIFIED LOCATION (MULTI): Primary | ICD-10-CM

## 2024-09-04 LAB
ALBUMIN SERPL BCP-MCNC: 3.6 G/DL (ref 3.4–5)
ALP SERPL-CCNC: 99 U/L (ref 33–136)
ALT SERPL W P-5'-P-CCNC: 14 U/L (ref 10–52)
ANION GAP SERPL CALC-SCNC: 11 MMOL/L (ref 10–20)
AST SERPL W P-5'-P-CCNC: 28 U/L (ref 9–39)
BASOPHILS # BLD AUTO: 0.03 X10*3/UL (ref 0–0.1)
BASOPHILS NFR BLD AUTO: 0.3 %
BILIRUB SERPL-MCNC: 0.8 MG/DL (ref 0–1.2)
BUN SERPL-MCNC: 21 MG/DL (ref 6–23)
CALCIUM SERPL-MCNC: 9 MG/DL (ref 8.6–10.6)
CHLORIDE SERPL-SCNC: 98 MMOL/L (ref 98–107)
CO2 SERPL-SCNC: 33 MMOL/L (ref 21–32)
CREAT SERPL-MCNC: 1.02 MG/DL (ref 0.5–1.3)
EGFRCR SERPLBLD CKD-EPI 2021: 83 ML/MIN/1.73M*2
EOSINOPHIL # BLD AUTO: 0.06 X10*3/UL (ref 0–0.7)
EOSINOPHIL NFR BLD AUTO: 0.7 %
ERYTHROCYTE [DISTWIDTH] IN BLOOD BY AUTOMATED COUNT: 13.8 % (ref 11.5–14.5)
GLUCOSE SERPL-MCNC: 119 MG/DL (ref 74–99)
HCT VFR BLD AUTO: 47.8 % (ref 41–52)
HGB BLD-MCNC: 15.3 G/DL (ref 13.5–17.5)
IMM GRANULOCYTES # BLD AUTO: 0.02 X10*3/UL (ref 0–0.7)
IMM GRANULOCYTES NFR BLD AUTO: 0.2 % (ref 0–0.9)
INR PPP: 1 (ref 0.9–1.1)
LIPASE SERPL-CCNC: 26 U/L (ref 9–82)
LYMPHOCYTES # BLD AUTO: 1.09 X10*3/UL (ref 1.2–4.8)
LYMPHOCYTES NFR BLD AUTO: 11.9 %
MCH RBC QN AUTO: 26.6 PG (ref 26–34)
MCHC RBC AUTO-ENTMCNC: 32 G/DL (ref 32–36)
MCV RBC AUTO: 83 FL (ref 80–100)
MONOCYTES # BLD AUTO: 0.36 X10*3/UL (ref 0.1–1)
MONOCYTES NFR BLD AUTO: 3.9 %
NEUTROPHILS # BLD AUTO: 7.59 X10*3/UL (ref 1.2–7.7)
NEUTROPHILS NFR BLD AUTO: 83 %
NRBC BLD-RTO: 0 /100 WBCS (ref 0–0)
PLATELET # BLD AUTO: 241 X10*3/UL (ref 150–450)
POTASSIUM SERPL-SCNC: 4.3 MMOL/L (ref 3.5–5.3)
POTASSIUM SERPL-SCNC: 5.8 MMOL/L (ref 3.5–5.3)
PROT SERPL-MCNC: 7.3 G/DL (ref 6.4–8.2)
PROTHROMBIN TIME: 11.3 SECONDS (ref 9.8–12.8)
RBC # BLD AUTO: 5.76 X10*6/UL (ref 4.5–5.9)
SODIUM SERPL-SCNC: 136 MMOL/L (ref 136–145)
WBC # BLD AUTO: 9.2 X10*3/UL (ref 4.4–11.3)

## 2024-09-04 PROCEDURE — 36415 COLL VENOUS BLD VENIPUNCTURE: CPT | Performed by: EMERGENCY MEDICINE

## 2024-09-04 PROCEDURE — 85025 COMPLETE CBC W/AUTO DIFF WBC: CPT | Performed by: EMERGENCY MEDICINE

## 2024-09-04 PROCEDURE — 80053 COMPREHEN METABOLIC PANEL: CPT | Performed by: EMERGENCY MEDICINE

## 2024-09-04 PROCEDURE — 36415 COLL VENOUS BLD VENIPUNCTURE: CPT | Performed by: NURSE PRACTITIONER

## 2024-09-04 PROCEDURE — 99284 EMERGENCY DEPT VISIT MOD MDM: CPT | Mod: 25

## 2024-09-04 PROCEDURE — 99285 EMERGENCY DEPT VISIT HI MDM: CPT | Performed by: NURSE PRACTITIONER

## 2024-09-04 PROCEDURE — 2550000001 HC RX 255 CONTRASTS: Mod: SE | Performed by: EMERGENCY MEDICINE

## 2024-09-04 PROCEDURE — 83690 ASSAY OF LIPASE: CPT | Performed by: EMERGENCY MEDICINE

## 2024-09-04 PROCEDURE — 85610 PROTHROMBIN TIME: CPT | Performed by: EMERGENCY MEDICINE

## 2024-09-04 PROCEDURE — 74177 CT ABD & PELVIS W/CONTRAST: CPT | Mod: FOREIGN READ | Performed by: RADIOLOGY

## 2024-09-04 PROCEDURE — 84132 ASSAY OF SERUM POTASSIUM: CPT | Performed by: NURSE PRACTITIONER

## 2024-09-04 PROCEDURE — 74177 CT ABD & PELVIS W/CONTRAST: CPT

## 2024-09-04 RX ORDER — LOPERAMIDE HYDROCHLORIDE 2 MG/1
2 CAPSULE ORAL 4 TIMES DAILY PRN
Qty: 12 CAPSULE | Refills: 0 | Status: SHIPPED | OUTPATIENT
Start: 2024-09-04 | End: 2024-09-07

## 2024-09-04 ASSESSMENT — COLUMBIA-SUICIDE SEVERITY RATING SCALE - C-SSRS
2. HAVE YOU ACTUALLY HAD ANY THOUGHTS OF KILLING YOURSELF?: NO
6. HAVE YOU EVER DONE ANYTHING, STARTED TO DO ANYTHING, OR PREPARED TO DO ANYTHING TO END YOUR LIFE?: NO
1. IN THE PAST MONTH, HAVE YOU WISHED YOU WERE DEAD OR WISHED YOU COULD GO TO SLEEP AND NOT WAKE UP?: NO

## 2024-09-04 ASSESSMENT — PAIN SCALES - GENERAL: PAINLEVEL_OUTOF10: 6

## 2024-09-04 ASSESSMENT — PAIN - FUNCTIONAL ASSESSMENT: PAIN_FUNCTIONAL_ASSESSMENT: 0-10

## 2024-09-04 NOTE — ED TRIAGE NOTES
Pt has colitis, has been on prednisone taper since colonoscopy 2 weeks ago. Still having blood in stool and diarrhea.

## 2024-09-04 NOTE — ED PROVIDER NOTES
"Emergency Department Encounter  [facility] EMERGENCY MEDICINE    Patient: Arnaldo Francisco  MRN: [id number]  : 1960  Date of Evaluation: 2024  ED Provider: MARISSA Christensen    ED care was supervised by Dr. Rosa who independently examined and evaluated the patient. Please see their attestation note for further details.    Limitations to history: none  Independent Historian: self  Records reviewed: Care everywhere, paper chart, Inpatient and outpatient notes    Chief Complaint       Chief Complaint   Patient presents with    Rectal Bleeding     [place]    (Location/Symptom, Timing/Onset, Context/Setting, Quality, Duration, Modifying Factors, Severity) Note limiting factors.   Arnaldo Francisco is a 64 y.o. male who presents to the emergency department complaining of bloody diarrhea. Pt has been experiencing \"15\" episodes daily of bright red bloody diarrhea since his Colonoscopy x amount of days ago. He has been on a Prednisone Taper since his results of his colonoscopy were confirmed. He states he takes medication for his Ulcerative Colitis but ran out and has not been taking it. He is currently established with a GI doctor Dr. Zelaya. The patient is experiencing no other abdominal pain or abdominal abnormalities.   Denies lightheadedness, syncope, weightloss, fever, chills.    ROS:     Review of Systems  14 systems reviewed and otherwise acutely negative except as in the [place].          Past History     Past Medical History:   Diagnosis Date    CHF (congestive heart failure) (Multi)     COPD (chronic obstructive pulmonary disease) (Multi)     Crohn's disease (Multi)     Gastro-esophageal reflux disease without esophagitis     HTN (hypertension)     On home oxygen therapy     Opioid use, unspecified, uncomplicated     Heroin use, on methadone therapy    CHARLENE (obstructive sleep apnea)     Pancreatic cyst (HHS-HCC)     Personal history of transient ischemic attack (TIA), and cerebral " infarction without residual deficits     History of stroke     Past Surgical History:   Procedure Laterality Date    COLONOSCOPY      ESOPHAGOGASTRODUODENOSCOPY      KNEE ARTHROSCOPY W/ MENISCAL REPAIR       Social History     Socioeconomic History    Marital status:    Tobacco Use    Smoking status: Former     Current packs/day: 0.00     Average packs/day: 1 pack/day for 20.0 years (20.0 ttl pk-yrs)     Types: Cigarettes     Start date:      Quit date:      Years since quittin.6     Passive exposure: Never    Smokeless tobacco: Never   Vaping Use    Vaping status: Never Used   Substance and Sexual Activity    Alcohol use: Not Currently     Comment: No drinks in a week was drinking 2-3 beers daily    Drug use: Not Currently     Types: Heroin     Comment: snort heroin but I am in the methadone program x 10 years    Sexual activity: Defer     Social Determinants of Health      Received from [facility]    Financial Resource Strain   Food Insecurity: Unknown (2024)    [facility]    Hunger Vital Sign     Worried About Running Out of Food in the Last Year: Never true    Received from [facility]    Transportation Needs    Received from [facility]    Physical Activity    Received from [facility]    Stress    Received from [facility]    Social Connections    Received from [facility]    Housing Stability       Medications/Allergies     Previous Medications    ALBUTEROL (VENTOLIN HFA) 90 MCG/ACTUATION INHALER    Inhale.    ALBUTEROL 0.63 MG/3 ML NEBULIZER SOLUTION    Take 3 mL (0.63 mg) by nebulization 4 times a day as needed.    ASPIRIN 81 MG EC TABLET    Take 1 tablet (81 mg) by mouth once daily.    DAPAGLIFLOZIN PROPANEDIOL (FARXIGA) 10 MG    Take 1 tablet (10 mg) by mouth once daily.    FERROUS SULFATE 325 (65 FE) MG TABLET    Take 1 tablet by mouth once daily.    FLUTICASONE PROPION-SALMETEROL (ADVAIR DISKUS) 500-50 MCG/DOSE DISKUS INHALER    Inhale 1 puff 2 times a day. Rinse mouth with  water after use to reduce aftertaste and incidence of candidiasis. Do not swallow.    FLUTICASONE PROPION-SALMETEROL (ADVAIR DISKUS) 500-50 MCG/DOSE DISKUS INHALER    Inhale 1 puff 2 times a day.    FUROSEMIDE (LASIX) 20 MG TABLET    Take 1 tablet (20 mg) by mouth once daily as needed (for weight gain of more than 3 lbs a day).    GABAPENTIN (NEURONTIN) 600 MG TABLET    Take 1 tablet (600 mg) by mouth. Take 600 mg in the morning, 600 mg midday, and 1200 mg at bedtime    IPRATROPIUM/ALBUTEROL SULFATE (COMBIVENT RESPIMAT INHL)    Inhale 2 puffs.    LEVOCETIRIZINE (XYZAL) 5 MG TABLET    Take by mouth once daily.    MESALAMINE (LIALDA) 1.2 GRAM EC TABLET    Take 1 tablet (1.2 g) by mouth 2 times a day. Do not crush, chew, or split.    METHADONE HCL (METHADONE ORAL)    Take 60 mL by mouth once daily. Pt states last dose was saturday    METOPROLOL SUCCINATE XL (TOPROL-XL) 200 MG 24 HR TABLET    Take 1 tablet (200 mg) by mouth once daily.    NITROGLYCERIN (NITROSTAT) 0.4 MG SL TABLET    Place under the tongue. Dissolve 1 tab under the tongue as needed for chest pain every 5 minutes up to 3 times if no relief call 911    OMEPRAZOLE (PRILOSEC) 20 MG DR CAPSULE    TAKE 1 CAPSULE(20 MG) BY MOUTH TWICE DAILY BEFORE MEALS. DO NOT CRUSH, CHEW, OR SPLIT    PRAVASTATIN (PRAVACHOL) 40 MG TABLET    Take 1 tablet (40 mg) by mouth once daily at bedtime.    PREDNISONE (DELTASONE) 10 MG TABLET    Take 4 tablets (40 mg) by mouth once daily for 7 days, THEN 3 tablets (30 mg) once daily for 7 days, THEN 2 tablets (20 mg) once daily for 7 days, THEN 1 tablet (10 mg) once daily for 7 days, THEN 0.5 tablets (5 mg) once daily for 7 days. Take by mouth as directed.    SACUBITRIL-VALSARTAN (ENTRESTO)  MG TABLET    Take 1 tablet by mouth 2 times a day.    SPIRONOLACTONE (ALDACTONE) 25 MG TABLET    Take 1 tablet (25 mg) by mouth once daily.    TAMSULOSIN (FLOMAX) 0.4 MG 24 HR CAPSULE    TAKE 1 CAPSULE BY MOUTH ONCE DAILY    TIOTROPIUM  (SPIRIVA RESPIMAT) 2.5 MCG/ACTUATION INHALER    Inhale 2 puffs once daily.    VEDOLIZUMAB (ENTYVIO) 300 MG INJECTION    Infuse 300 mg into a venous catheter 1 time for 1 dose.  For Induction:  0, 2 and 6 weeks then every 8 weeks     Allergies   Allergen Reactions    Shellfish Containing Products Anaphylaxis    Ibuprofen Hives        Physical Exam       ED Triage Vitals   Temperature Heart Rate Respirations BP   09/04/24 0953 09/04/24 0953 09/04/24 0953 09/04/24 0955   36.6 °C (97.9 °F) 96 20 145/80      Pulse Ox Temp src Heart Rate Source Patient Position   09/04/24 0953 -- -- --   (!) 92 %         BP Location FiO2 (%)     -- --               Physical Exam    GENERAL:  The patient appears nourished and normally developed. Vital signs as documented.     HEENT:  Head normocephalic, atraumatic, EOMs intact, PERRLA, Mucous membranes moist. Nares patent without copious rhinorrhea.  No lymphadenopathy.    PULMONARY:  Lungs are clear to auscultation, without any respiratory distress. Able to speak full sentences, no accessory muscle use    CARDIAC:   Normal rate. No murmurs, rubs or gallops    ABDOMEN:  Soft, non-distended, non-tender, BS positive x 4 quadrants, No rebound or guarding, no peritoneal signs, no CVA tenderness, no masses or organomegaly    MUSCULOSKELETAL:   Able to ambulate, Non edematous, with no obvious deformities. Pulses intact distal    SKIN:   Good color, with no significant rashes.  No pallor.    NEURO:  No obvious neurological deficits, normal sensation and strength bilaterally.  Able to follow commands, NIH 0, CN 2-12 intact.        Diagnostics   Labs:  Results for orders placed or performed during the hospital encounter of 09/04/24   CBC with Differential   Result Value Ref Range    WBC 9.2 4.4 - 11.3 x10*3/uL    nRBC 0.0 0.0 - 0.0 /100 WBCs    RBC 5.76 4.50 - 5.90 x10*6/uL    Hemoglobin 15.3 13.5 - 17.5 g/dL    Hematocrit 47.8 41.0 - 52.0 %    MCV 83 80 - 100 fL    MCH 26.6 26.0 - 34.0 pg    MCHC  32.0 32.0 - 36.0 g/dL    RDW 13.8 11.5 - 14.5 %    Platelets 241 150 - 450 x10*3/uL    Neutrophils % 83.0 40.0 - 80.0 %    Immature Granulocytes %, Automated 0.2 0.0 - 0.9 %    Lymphocytes % 11.9 13.0 - 44.0 %    Monocytes % 3.9 2.0 - 10.0 %    Eosinophils % 0.7 0.0 - 6.0 %    Basophils % 0.3 0.0 - 2.0 %    Neutrophils Absolute 7.59 1.20 - 7.70 x10*3/uL    Immature Granulocytes Absolute, Automated 0.02 0.00 - 0.70 x10*3/uL    Lymphocytes Absolute 1.09 (L) 1.20 - 4.80 x10*3/uL    Monocytes Absolute 0.36 0.10 - 1.00 x10*3/uL    Eosinophils Absolute 0.06 0.00 - 0.70 x10*3/uL    Basophils Absolute 0.03 0.00 - 0.10 x10*3/uL   Comprehensive Metabolic Panel   Result Value Ref Range    Glucose 119 (H) 74 - 99 mg/dL    Sodium 136 136 - 145 mmol/L    Potassium 5.8 (H) 3.5 - 5.3 mmol/L    Chloride 98 98 - 107 mmol/L    Bicarbonate 33 (H) 21 - 32 mmol/L    Anion Gap 11 10 - 20 mmol/L    Urea Nitrogen 21 6 - 23 mg/dL    Creatinine 1.02 0.50 - 1.30 mg/dL    eGFR 83 >60 mL/min/1.73m*2    Calcium 9.0 8.6 - 10.6 mg/dL    Albumin 3.6 3.4 - 5.0 g/dL    Alkaline Phosphatase 99 33 - 136 U/L    Total Protein 7.3 6.4 - 8.2 g/dL    AST 28 9 - 39 U/L    Bilirubin, Total 0.8 0.0 - 1.2 mg/dL    ALT 14 10 - 52 U/L   Lipase   Result Value Ref Range    Lipase 26 9 - 82 U/L   Protime-INR   Result Value Ref Range    Protime 11.3 9.8 - 12.8 seconds    INR 1.0 0.9 - 1.1   Potassium   Result Value Ref Range    Potassium 4.3 3.5 - 5.3 mmol/L     Radiographs:  CT abdomen pelvis w IV contrast   Final Result   1.  Mild colitis involving the descending colon, sigmoid colon, and   rectum.   2.  Stable cystic lesion off of the uncinate process of the pancreas.   3.  Mild pancreatic ductal dilatation, unchanged from prior study.   Signed by Triston Lara MD          Procedures:   Procedures             Assessment   In brief, Arnaldo Francisco is a 64 y.o. male who presented to the emergency department with persistent bloody diarrhea, history of IBD and  "ulcerative colitis, followed by gastroenterology    Plan   IV, lab work, communication with patient's gastroenterologist    Differentials   Colitis  IBD  Infectious diarrhea    ED Course     Diagnoses as of 09/04/24 1531   Ulcerative colitis with complication, unspecified location (Multi)       Visit Vitals  /90   Pulse 87   Temp 36.6 °C (97.9 °F)   Resp 16   Ht 1.803 m (5' 11\")   Wt 88 kg (194 lb)   SpO2 95%   BMI 27.06 kg/m²   Smoking Status Former   BSA 2.1 m²       Medications   iohexol (OMNIPaque) 350 mg iodine/mL solution 80 mL (80 mL intravenous Given 9/4/24 1347)       Plan of care discussed, patient is stable appearing, labs obtained and show hemolyzed potassium at 5.9 with no RICKY.  Repeat drawn.  Messaged Dr. Zelaya about plan, recommend CT and can put on Immodium if negative.  He will message the IBD nurse to see if the patients Entyvio has been approved.  Results reviewed and discussed, patient will be discharged home in stable condition, educated on any worsening signs and symptoms to return to the emergency department      Final Impression      1. Ulcerative colitis with complication, unspecified location (Multi)          DISPOSITION  Disposition: Discharge to home  Patient condition is stable    Comment: Please note this report has been produced using speech recognition software and may contain errors related to that system including errors in grammar, punctuation, and spelling, as well as words and phrases that may be inappropriate.  If there are any questions or concerns please feel free to contact the dictating provider for clarification.    MARISSA Christensen APRN-CNP  09/04/24 1532    "

## 2024-09-05 ENCOUNTER — TELEPHONE (OUTPATIENT)
Dept: GASTROENTEROLOGY | Facility: CLINIC | Age: 64
End: 2024-09-05
Payer: MEDICAID

## 2024-09-05 NOTE — TELEPHONE ENCOUNTER
Antwan Zelaya,   I received a call from patient wife. She is requesting a call back to discuss the test results .

## 2024-09-09 DIAGNOSIS — K51.811 OTHER ULCERATIVE COLITIS WITH RECTAL BLEEDING (MULTI): Primary | ICD-10-CM

## 2024-09-09 DIAGNOSIS — K51.011 ULCERATIVE PANCOLITIS WITH RECTAL BLEEDING (MULTI): Primary | ICD-10-CM

## 2024-09-10 ENCOUNTER — LAB (OUTPATIENT)
Dept: LAB | Facility: LAB | Age: 64
End: 2024-09-10
Payer: MEDICAID

## 2024-09-10 ENCOUNTER — TELEPHONE (OUTPATIENT)
Dept: GASTROENTEROLOGY | Facility: CLINIC | Age: 64
End: 2024-09-10

## 2024-09-10 DIAGNOSIS — K51.011 ULCERATIVE PANCOLITIS WITH RECTAL BLEEDING (MULTI): Primary | ICD-10-CM

## 2024-09-10 DIAGNOSIS — K51.011 ULCERATIVE PANCOLITIS WITH RECTAL BLEEDING (MULTI): ICD-10-CM

## 2024-09-10 DIAGNOSIS — K50.819 CROHN'S DISEASE OF SMALL AND LARGE INTESTINES WITH COMPLICATION (MULTI): ICD-10-CM

## 2024-09-10 DIAGNOSIS — K51.811 OTHER ULCERATIVE COLITIS WITH RECTAL BLEEDING (MULTI): ICD-10-CM

## 2024-09-10 LAB
HBV CORE AB SER QL: NONREACTIVE
HBV CORE IGM SER QL: NONREACTIVE
HBV SURFACE AB SER-ACNC: <3.1 MIU/ML
HBV SURFACE AG SERPL QL IA: NONREACTIVE
HCV AB SER QL: NONREACTIVE

## 2024-09-10 PROCEDURE — 86704 HEP B CORE ANTIBODY TOTAL: CPT

## 2024-09-10 PROCEDURE — 86481 TB AG RESPONSE T-CELL SUSP: CPT

## 2024-09-10 PROCEDURE — 87340 HEPATITIS B SURFACE AG IA: CPT

## 2024-09-10 PROCEDURE — 86706 HEP B SURFACE ANTIBODY: CPT

## 2024-09-10 PROCEDURE — 86803 HEPATITIS C AB TEST: CPT

## 2024-09-10 PROCEDURE — 86705 HEP B CORE ANTIBODY IGM: CPT

## 2024-09-10 PROCEDURE — 36415 COLL VENOUS BLD VENIPUNCTURE: CPT

## 2024-09-10 RX ORDER — LOPERAMIDE HYDROCHLORIDE 2 MG/1
2 CAPSULE ORAL 4 TIMES DAILY PRN
Qty: 30 CAPSULE | Refills: 0 | Status: SHIPPED | OUTPATIENT
Start: 2024-09-10 | End: 2024-10-10

## 2024-09-10 RX ORDER — PREDNISONE 10 MG/1
TABLET ORAL
Qty: 25 TABLET | Refills: 0 | Status: SHIPPED | OUTPATIENT
Start: 2024-09-10 | End: 2024-10-01

## 2024-09-10 NOTE — PROGRESS NOTES
Pt was called - he went in for his TB test today and it will result in 4-5 days- we will appeal the vedolizumab denial- if still denied, we will initiate infliximab base don our discussion today.

## 2024-09-12 LAB
NIL(NEG) CONTROL SPOT COUNT: NORMAL
PANEL A SPOT COUNT: 0
PANEL B SPOT COUNT: 3
POS CONTROL SPOT COUNT: NORMAL
T-SPOT. TB INTERPRETATION: NEGATIVE

## 2024-09-14 DIAGNOSIS — I50.22 CHRONIC SYSTOLIC CONGESTIVE HEART FAILURE: ICD-10-CM

## 2024-09-16 DIAGNOSIS — J43.9 PULMONARY EMPHYSEMA, UNSPECIFIED EMPHYSEMA TYPE (MULTI): ICD-10-CM

## 2024-09-16 DIAGNOSIS — K50.819 CROHN'S DISEASE OF SMALL AND LARGE INTESTINES WITH COMPLICATION (MULTI): Primary | ICD-10-CM

## 2024-09-16 DIAGNOSIS — K50.80 CROHN'S DISEASE OF BOTH SMALL AND LARGE INTESTINE WITHOUT COMPLICATION (MULTI): Primary | ICD-10-CM

## 2024-09-16 RX ORDER — EPINEPHRINE 0.3 MG/.3ML
0.3 INJECTION SUBCUTANEOUS EVERY 5 MIN PRN
OUTPATIENT
Start: 2024-09-16

## 2024-09-16 RX ORDER — ASPIRIN 81 MG/1
81 TABLET ORAL DAILY
Qty: 30 TABLET | Refills: 1 | Status: SHIPPED | OUTPATIENT
Start: 2024-09-16

## 2024-09-16 RX ORDER — ACETAMINOPHEN 325 MG/1
650 TABLET ORAL ONCE
OUTPATIENT
Start: 2024-09-16 | End: 2024-09-16

## 2024-09-16 RX ORDER — FAMOTIDINE 10 MG/ML
20 INJECTION INTRAVENOUS ONCE AS NEEDED
OUTPATIENT
Start: 2024-09-16

## 2024-09-16 RX ORDER — DIPHENHYDRAMINE HYDROCHLORIDE 50 MG/ML
50 INJECTION INTRAMUSCULAR; INTRAVENOUS AS NEEDED
OUTPATIENT
Start: 2024-09-16

## 2024-09-16 RX ORDER — TIOTROPIUM BROMIDE INHALATION SPRAY 3.12 UG/1
2 SPRAY, METERED RESPIRATORY (INHALATION) DAILY
Qty: 4 G | Refills: 3 | Status: SHIPPED | OUTPATIENT
Start: 2024-09-16

## 2024-09-16 RX ORDER — ALBUTEROL SULFATE 0.83 MG/ML
3 SOLUTION RESPIRATORY (INHALATION) AS NEEDED
OUTPATIENT
Start: 2024-09-16

## 2024-09-17 ENCOUNTER — TELEPHONE (OUTPATIENT)
Dept: GASTROENTEROLOGY | Facility: HOSPITAL | Age: 64
End: 2024-09-17
Payer: MEDICAID

## 2024-09-17 DIAGNOSIS — K50.90 CROHN'S DISEASE WITHOUT COMPLICATION, UNSPECIFIED GASTROINTESTINAL TRACT LOCATION: Primary | ICD-10-CM

## 2024-09-17 RX ORDER — ACETAMINOPHEN 325 MG/1
650 TABLET ORAL ONCE
OUTPATIENT
Start: 2024-09-17 | End: 2024-09-17

## 2024-09-17 RX ORDER — DIPHENHYDRAMINE HYDROCHLORIDE 50 MG/ML
50 INJECTION INTRAMUSCULAR; INTRAVENOUS AS NEEDED
OUTPATIENT
Start: 2024-09-17

## 2024-09-17 RX ORDER — EPINEPHRINE 0.3 MG/.3ML
0.3 INJECTION SUBCUTANEOUS EVERY 5 MIN PRN
OUTPATIENT
Start: 2024-09-17

## 2024-09-17 RX ORDER — ALBUTEROL SULFATE 0.83 MG/ML
3 SOLUTION RESPIRATORY (INHALATION) AS NEEDED
OUTPATIENT
Start: 2024-09-17

## 2024-09-17 RX ORDER — FAMOTIDINE 10 MG/ML
20 INJECTION INTRAVENOUS ONCE AS NEEDED
OUTPATIENT
Start: 2024-09-17

## 2024-09-17 NOTE — TELEPHONE ENCOUNTER
Pt was called - he continues to have diarrhea 5-7 loose watery Bms on prednisone 20mg a day (he has been on prednisone taper for 4 weeks now)- of note he was previously on mesalamine for >1 Year- recent colonoscopy with severe L sided colitis, given known worsening endoscopic disease and flare on mesalamine with poor response to prednisone, escalation to inflectra is indicated. We will obtain approval.

## 2024-09-24 ENCOUNTER — TELEPHONE (OUTPATIENT)
Dept: GASTROENTEROLOGY | Facility: HOSPITAL | Age: 64
End: 2024-09-24
Payer: MEDICAID

## 2024-09-24 DIAGNOSIS — K50.10 CROHN'S DISEASE OF LARGE INTESTINE WITHOUT COMPLICATION (MULTI): Primary | ICD-10-CM

## 2024-09-24 RX ORDER — PREDNISONE 10 MG/1
TABLET ORAL
Qty: 39 TABLET | Refills: 0 | Status: SHIPPED | OUTPATIENT
Start: 2024-09-24 | End: 2024-10-22

## 2024-09-24 NOTE — TELEPHONE ENCOUNTER
Pt was called and informed that after peer to peer, inflectra 10mg/kg has been approved- he confirmed he does not have an sx or hx of advanced stage C or D heart failure and/or demyelinating disease.    I have prescribed prednisone 20mg X2 weeks>10mgX1 week> 5mg X 1 week as a bridge to inflectra.    I also counseled him and his wife that if he has worsening abd pain or worsening bloody diarrhea, f/c, LH or dizziness, while awaiting infusion appointment, he should go to ED for eval, of note in the past he has refused to go to ED.    I have also emailed [facility] pharmacy and IBD nurse manager requesting them to EXPEDITE infusion appointment.

## 2024-09-25 ENCOUNTER — DOCUMENTATION (OUTPATIENT)
Dept: INFUSION THERAPY | Facility: CLINIC | Age: 64
End: 2024-09-25
Payer: MEDICAID
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
## 2024-10-07 ENCOUNTER — APPOINTMENT (OUTPATIENT)
Dept: INFUSION THERAPY | Facility: CLINIC | Age: 64
End: 2024-10-07
Payer: MEDICAID

## 2024-10-07 VITALS
RESPIRATION RATE: 16 BRPM | BODY MASS INDEX: 25.86 KG/M2 | HEART RATE: 60 BPM | OXYGEN SATURATION: 90 % | WEIGHT: 185.41 LBS | DIASTOLIC BLOOD PRESSURE: 65 MMHG | SYSTOLIC BLOOD PRESSURE: 104 MMHG | TEMPERATURE: 97.9 F

## 2024-10-07 DIAGNOSIS — K50.819 CROHN'S DISEASE OF SMALL AND LARGE INTESTINES WITH COMPLICATION (MULTI): ICD-10-CM

## 2024-10-07 PROCEDURE — 96413 CHEMO IV INFUSION 1 HR: CPT | Performed by: NURSE PRACTITIONER

## 2024-10-07 PROCEDURE — 96415 CHEMO IV INFUSION ADDL HR: CPT | Performed by: NURSE PRACTITIONER

## 2024-10-07 RX ORDER — FAMOTIDINE 10 MG/ML
20 INJECTION INTRAVENOUS ONCE AS NEEDED
OUTPATIENT
Start: 2024-10-21

## 2024-10-07 RX ORDER — DIPHENHYDRAMINE HYDROCHLORIDE 50 MG/ML
50 INJECTION INTRAMUSCULAR; INTRAVENOUS AS NEEDED
OUTPATIENT
Start: 2024-10-21

## 2024-10-07 RX ORDER — ACETAMINOPHEN 325 MG/1
650 TABLET ORAL ONCE
OUTPATIENT
Start: 2024-10-21 | End: 2024-10-21

## 2024-10-07 RX ORDER — ACETAMINOPHEN 325 MG/1
650 TABLET ORAL ONCE
Status: COMPLETED | OUTPATIENT
Start: 2024-10-07 | End: 2024-10-07

## 2024-10-07 RX ORDER — EPINEPHRINE 0.3 MG/.3ML
0.3 INJECTION SUBCUTANEOUS EVERY 5 MIN PRN
OUTPATIENT
Start: 2024-10-21

## 2024-10-07 RX ORDER — ACETAMINOPHEN 325 MG/1
650 TABLET ORAL ONCE
Status: CANCELLED | OUTPATIENT
Start: 2024-10-21 | End: 2024-10-21

## 2024-10-07 RX ORDER — ALBUTEROL SULFATE 0.83 MG/ML
3 SOLUTION RESPIRATORY (INHALATION) AS NEEDED
OUTPATIENT
Start: 2024-10-21

## 2024-10-07 ASSESSMENT — ENCOUNTER SYMPTOMS
UNEXPECTED WEIGHT CHANGE: 0
HEMATURIA: 0
FREQUENCY: 0
ABDOMINAL PAIN: 1
VOMITING: 0
PALPITATIONS: 0
CONSTIPATION: 0
LEG SWELLING: 0
SHORTNESS OF BREATH: 0
EXTREMITY WEAKNESS: 0
LIGHT-HEADEDNESS: 0
COUGH: 0
HEADACHES: 0
MYALGIAS: 0
WHEEZING: 0
NERVOUS/ANXIOUS: 0
NUMBNESS: 0
EYE PROBLEMS: 0
ARTHRALGIAS: 0
CHILLS: 0
DYSURIA: 0
APPETITE CHANGE: 0
NAUSEA: 0
DEPRESSION: 0
FATIGUE: 0
FEVER: 0
DIZZINESS: 0
DIARRHEA: 1
BLOOD IN STOOL: 1
SORE THROAT: 0
TROUBLE SWALLOWING: 0
VOICE CHANGE: 0
WOUND: 0

## 2024-10-07 NOTE — PROGRESS NOTES
[facility]   Infusion Clinic Note   Date: 2024   Name: Arnaldo Francisco  : 1960   MRN: [id number]          Reason for Visit: OP Infusion and New Patient Visit (PT HERE FOR FIRST DOSE OF INFLECTRA 400 MG INFUSION - NEXT DUE IN 2 WEEKS)         Today: We administered acetaminophen and inFLIXimab-dyyb (Inflectra) 400 mg in sodium chloride 0.9% 250 mL IV.       Visit Type: INFUSION       Ordered By: DR. THOMPSON       Accompanied by:Wife       Diagnosis: Crohn's disease of small and large intestines with complication (Multi)        Allergies:   Allergies as of 10/07/2024 - Reviewed 10/07/2024   Allergen Reaction Noted    Shellfish containing products Anaphylaxis 02/10/2023    Ibuprofen Hives 02/10/2023          Current Medications has a current medication list which includes the following prescription(s): albuterol, albuterol, aspirin, dapagliflozin propanediol, ferrous sulfate (325 mg ferrous sulfate), fluticasone propion-salmeterol, fluticasone propion-salmeterol, furosemide, gabapentin, infliximab-dyyb, ipratropium/albuterol sulfate, levocetirizine, loperamide, mesalamine, methadone hcl, metoprolol succinate xl, nitroglycerin, omeprazole, pravastatin, prednisone, entresto, spironolactone, tamsulosin, and spiriva respimat.       Vitals:   Vitals:    10/07/24 0710 10/07/24 0821 10/07/24 0850 10/07/24 0935   BP: 115/68 106/68 98/63  Comment: nurse notified 95/62  Comment: nurse notified   Pulse: 79 79 68 72   Resp: 18 16 18 16   Temp: 36.4 °C (97.6 °F) 36.7 °C (98.1 °F) 36.7 °C (98.1 °F) 36.3 °C (97.4 °F)   SpO2: 98% 92% 98% 93%   Weight: 84.1 kg (185 lb 6.5 oz)       10/07/24 1015   BP: 104/65  Comment: nurse notified   Pulse: 60   Resp: 16   Temp: 36.6 °C (97.9 °F)   SpO2: 90%  Comment: nurse notified   Weight:              Infusion Pre-procedure Checklist:   - Allergies reviewed: yes   - Medications reviewed: yes       - Previous reaction to current treatment: n/a      Assess  patient for the concerns below. Document provider notification as appropriate.  - Active or recent infection with/without current antibiotic use: no  - Recent or planned invasive dental work: no  - Recent or planned surgeries: no  - Recently received or plans to receive vaccinations: no  - Has treatment related toxicities: no  - Is pregnant:  n/a      Pain: 0   - Is the pain different from normal: n/a   - Is your Doctor aware:  n/a       Labs: N/A          Fall Risk Screening: Muir Fall Risk  History of Falling, Immediate or Within 3 Months: No  Secondary Diagnosis: No  Ambulatory Aid: Walks without aid/bedrest/nurse assist  Intravenous Therapy/Heparin Lock: Yes  Gait/Transferring: Normal/bedrest/immobile  Mental Status: Oriented to own ability  Muir Fall Risk Score: 20       Review Of Systems:  Review of Systems   Constitutional:  Negative for appetite change, chills, fatigue, fever and unexpected weight change.   HENT:   Negative for hearing loss, mouth sores, sore throat, tinnitus, trouble swallowing and voice change.    Eyes:  Negative for eye problems.   Respiratory:  Negative for cough, shortness of breath and wheezing.    Cardiovascular:  Negative for chest pain, leg swelling and palpitations.   Gastrointestinal:  Positive for abdominal pain, blood in stool and diarrhea. Negative for constipation, nausea and vomiting.        ADMITS TO INTERMITTENT ABDOMINAL PAIN BUT DENIES N/V  ADMITS TO APPROX, 10-12 STOOLS PER DAY,  LOOSE IN CONSISTENCY  ADMITS TO INTERMITTENT DIARRHEA BUT DENIES CONSTIPATION  ADMITS TO FREQUENT NOCTURNAL STOOLING  ADMITS TO MUCOUS AND BLOOD IN THE STOOL   Genitourinary:  Negative for dysuria, frequency and hematuria.    Musculoskeletal:  Negative for arthralgias and myalgias.   Skin:  Negative for itching, rash and wound.   Neurological:  Negative for dizziness, extremity weakness, headaches, light-headedness and numbness.   Psychiatric/Behavioral:  Negative for depression. The patient  "is not nervous/anxious.          ROS completed? Yes      Infusion Readiness:  - Assessment Concerns Related to Infusion: No  - Provider notified: n/a      Document Below Only If Indicated:   New Patient Education:    NEW PATIENT MEDICATION EDUCATION PT PROVIDED WITH WRITTEN ([facility] PT EDUCATION SHEET) AND VERBAL EDUCATION REGARDING MEDICATION GIVEN. VERIFIED MEDICATION NAME WITH PATIENT AND DISCUSSED REASON FOR USE. BRIEFLY DISCUSSED HOW MEDICATION WORKS AND EDUCATED ON GOAL OF TREATMENT, FREQUENCY OF TREATMENT, ADVERSE RXN'S AND COMMON SIDE EFFECTS TO MONITOR FOR. INSTRUCTED PT TO ASSURE THAT ALL PROVIDERS INCLUDING DENTISTS ARE AWARE OF MEDICATION RECEIVED. DISCUSSED FLOW OF VISIT AND ORIENTED TO INFUSION CENTER. PT VERBALIZES UNDERSTANDING. CALL LIGHT PROVIDED AND PT AWARE TO ALERT STAFF OF ANY CONCERNS DURING TREATMENT.        Treatment Conditions & Drug Specific Questions:    InFLIXimab  (AVSOLA, INFLECTRA, REMICADE, RENFLEXIS)    (Unless otherwise specified on patient specific therapy plan):     TREATMENT CONDITIONS:  Unless otherwise specified on patient specific therapy plan HOLD and notify Provider prior to proceeding with today's infusion if patient with:  o Positive T-Spot  o Reactive Hep B sAg and/or Hep B Core Antibody    Lab Results   Component Value Date    TBSIN Negative 09/10/2024      Lab Results   Component Value Date    HEPBSAG Nonreactive 09/10/2024      No results found for: \"NONUHFIRE\", \"NONUHSWGH\", \"NONUHFISH\", \"EXTHEPBSAG\"  Lab Results   Component Value Date    HEPBCAB Nonreactive 09/10/2024     Lab Results   Component Value Date    HEPBCIGM Nonreactive 09/10/2024    HEPBCAB Nonreactive 09/10/2024    HEPCAB Nonreactive 09/10/2024        Labs reviewed and patient meets treatment conditions? Yes    DRUG SPECIFIC QUESTIONS:    Any new or worsening signs / symptoms of heart failure which may include things like worsening shortness of breath, swelling, fatigue? No   (If yes notify provider " before proceeding with today's infusion. New onset or worsening HF have been reported with infliximab)    REMINDER:   WEIGHT BASED DRUG     Recommended Vitals/Observation:  Vitals:     Induction: Obtain vital signs every 30 minutes; at end of observation period and as needed.     Maintenance: Obtain vital signs at start and end of infusions  Observation:     Induction: Patient is monitored for 30 minutes post-infusion     Maintenance: No observation.        Weight Based Drug Calculations:    WEIGHT BASED DRUGS: Infliximab (REMICADE, INFLECTRA, RENFLEXIS)   Patient's dosing weight (kg): 88 KG     10% weight variance for prescribed treatment: 79.5 kg to 96.8 kg     Patient's weight today: 84.1 KG  Vitals:    10/07/24 0710   Weight: 84.1 kg (185 lb 6.5 oz)         weight range for prescribed dose:     Patient weight today falls outside of 10% variance or  weight range: No     [facility] pharmacist informed of weight variance: Not applicable    Doses that are weight based have an acceptable variance rule within 10% of the prescribed   order and/or within  weight range. If patient weight on day of infusion falls   outside of the 10% variance, or weight range, infusion is administered and   pharmacy contacted regarding future dosing adjustments, per policy.         Initiated By: Kaylyn Verdin RN

## 2024-10-07 NOTE — PATIENT INSTRUCTIONS
Today :We administered acetaminophen and inFLIXimab-dyyb (Inflectra) 400 mg in sodium chloride 0.9% 250 mL IV.     For:   1. Crohn's disease of small and large intestines with complication (Multi)         Your next appointment is due in:  2 WEEKS        Please read the  Medication Guide that was given to you and reviewed during todays visit.     (Tell all doctors including dentists that you are taking this medication)     Go to the emergency room or call 911 if:  -You have signs of allergic reaction:   -Rash, hives, itching.   -Swollen, blistered, peeling skin.   -Swelling of face, lips, mouth, tongue or throat.   -Tightness of chest, trouble breathing, swallowing or talking     Call your doctor:  - If IV / injection site gets red, warm, swollen, itchy or leaks fluid or pus.     (Leave dressing on your IV site for at least 2 hours and keep area clean and dry  - If you get sick or have symptoms of infection or are not feeling well for any reason.    (Wash your hands often, stay away from people who are sick)  - If you have side effects from your medication that do not go away or are bothersome.     (Refer to the teaching your nurse gave you for side effects to call your doctor about)    - Common side effects may include:  stuffy nose, headache, feeling tired, muscle aches, upset stomach  - Before receiving any vaccines     - Call [facility] at   If:  - You get sick, are on antibiotics, have had a recent vaccine, have surgery or dental work and your doctor wants your visit rescheduled.  - You need to cancel and reschedule your visit for any reason. Call at least 2 days before your visit if you need to cancel.   - Your insurance changes before your next visit.    (We will need to get approval from your new insurance. This can take up to two weeks.)     The [facility] is opened Monday thru Friday. We are closed on weekends and holidays.   Voice mail will take your call if the center  is closed. If you leave a message please allow 24 hours for a call back during weekdays. If you leave a message on a weekend/holiday, we will call you back the next business day.

## 2024-10-21 ENCOUNTER — APPOINTMENT (OUTPATIENT)
Dept: INFUSION THERAPY | Facility: CLINIC | Age: 64
End: 2024-10-21
Payer: MEDICAID

## 2024-10-21 VITALS
SYSTOLIC BLOOD PRESSURE: 91 MMHG | WEIGHT: 185.63 LBS | TEMPERATURE: 97.2 F | BODY MASS INDEX: 25.89 KG/M2 | RESPIRATION RATE: 16 BRPM | OXYGEN SATURATION: 94 % | HEART RATE: 52 BPM | DIASTOLIC BLOOD PRESSURE: 60 MMHG

## 2024-10-21 DIAGNOSIS — K50.819 CROHN'S DISEASE OF SMALL AND LARGE INTESTINES WITH COMPLICATION (MULTI): ICD-10-CM

## 2024-10-21 PROCEDURE — 96415 CHEMO IV INFUSION ADDL HR: CPT | Performed by: NURSE PRACTITIONER

## 2024-10-21 PROCEDURE — 96413 CHEMO IV INFUSION 1 HR: CPT | Performed by: NURSE PRACTITIONER

## 2024-10-21 RX ORDER — EPINEPHRINE 0.3 MG/.3ML
0.3 INJECTION SUBCUTANEOUS EVERY 5 MIN PRN
OUTPATIENT
Start: 2024-11-18

## 2024-10-21 RX ORDER — ACETAMINOPHEN 325 MG/1
650 TABLET ORAL ONCE
Status: CANCELLED | OUTPATIENT
Start: 2024-11-18 | End: 2024-11-18

## 2024-10-21 RX ORDER — DIPHENHYDRAMINE HYDROCHLORIDE 50 MG/ML
50 INJECTION INTRAMUSCULAR; INTRAVENOUS AS NEEDED
OUTPATIENT
Start: 2024-11-18

## 2024-10-21 RX ORDER — ACETAMINOPHEN 325 MG/1
650 TABLET ORAL ONCE
Status: COMPLETED | OUTPATIENT
Start: 2024-10-21 | End: 2024-10-21

## 2024-10-21 RX ORDER — FAMOTIDINE 10 MG/ML
20 INJECTION INTRAVENOUS ONCE AS NEEDED
OUTPATIENT
Start: 2024-11-18

## 2024-10-21 RX ORDER — ACETAMINOPHEN 325 MG/1
650 TABLET ORAL ONCE
OUTPATIENT
Start: 2024-11-18 | End: 2024-11-18

## 2024-10-21 RX ORDER — ALBUTEROL SULFATE 0.83 MG/ML
3 SOLUTION RESPIRATORY (INHALATION) AS NEEDED
OUTPATIENT
Start: 2024-11-18

## 2024-10-21 ASSESSMENT — ENCOUNTER SYMPTOMS
NAUSEA: 0
CHILLS: 0
TROUBLE SWALLOWING: 0
BLOOD IN STOOL: 1
EYE PROBLEMS: 0
DYSURIA: 0
COUGH: 0
NERVOUS/ANXIOUS: 0
APPETITE CHANGE: 0
NUMBNESS: 0
VOMITING: 0
VOICE CHANGE: 0
FATIGUE: 0
DIZZINESS: 0
FREQUENCY: 0
WHEEZING: 0
DIARRHEA: 0
EXTREMITY WEAKNESS: 0
SHORTNESS OF BREATH: 0
HEADACHES: 0
PALPITATIONS: 0
UNEXPECTED WEIGHT CHANGE: 0
LEG SWELLING: 0
ARTHRALGIAS: 0
SORE THROAT: 0
FEVER: 0
ABDOMINAL PAIN: 0
HEMATURIA: 0
LIGHT-HEADEDNESS: 0
CONSTIPATION: 0
WOUND: 0
DEPRESSION: 0
MYALGIAS: 0

## 2024-10-21 NOTE — PATIENT INSTRUCTIONS
Today :We administered acetaminophen and inFLIXimab-dyyb (Inflectra) 400 mg in sodium chloride 0.9% 250 mL IV.     For:   1. Crohn's disease of small and large intestines with complication (Multi)         Your next appointment is due in:  4 WEEKS        Please read the  Medication Guide that was given to you and reviewed during todays visit.     (Tell all doctors including dentists that you are taking this medication)     Go to the emergency room or call 911 if:  -You have signs of allergic reaction:   -Rash, hives, itching.   -Swollen, blistered, peeling skin.   -Swelling of face, lips, mouth, tongue or throat.   -Tightness of chest, trouble breathing, swallowing or talking     Call your doctor:  - If IV / injection site gets red, warm, swollen, itchy or leaks fluid or pus.     (Leave dressing on your IV site for at least 2 hours and keep area clean and dry  - If you get sick or have symptoms of infection or are not feeling well for any reason.    (Wash your hands often, stay away from people who are sick)  - If you have side effects from your medication that do not go away or are bothersome.     (Refer to the teaching your nurse gave you for side effects to call your doctor about)    - Common side effects may include:  stuffy nose, headache, feeling tired, muscle aches, upset stomach  - Before receiving any vaccines     - Call the [facility] at   If:  - You get sick, are on antibiotics, have had a recent vaccine, have surgery or dental work and your doctor wants your visit rescheduled.  - You need to cancel and reschedule your visit for any reason. Call at least 2 days before your visit if you need to cancel.   - Your insurance changes before your next visit.    (We will need to get approval from your new insurance. This can take up to two weeks.)     [facility] is opened Monday thru Friday. We are closed on weekends and holidays.   Voice mail will take your call if the center  is closed. If you leave a message please allow 24 hours for a call back during weekdays. If you leave a message on a weekend/holiday, we will call you back the next business day.

## 2024-10-21 NOTE — PROGRESS NOTES
[facility]   Infusion Clinic Note   Date: 2024   Name: Arnaldo Francisco  : 1960   MRN: [id number]          Reason for Visit: OP Infusion and Follow-up (PT HERE FOR 'WEEK 2' OF INDUCTION SERIES OF INFLECTRA 400 MG INFUSION - NEXT DOSE DUE IN 4 WEEKS)         Today: We administered acetaminophen and inFLIXimab-dyyb (Inflectra) 400 mg in sodium chloride 0.9% 250 mL IV.       Visit Type: INFUSION       Ordered By: DR. THOMPSON       Accompanied by:Self       Diagnosis: Crohn's disease of small and large intestines with complication (Multi)        Allergies:   Allergies as of 10/21/2024 - Reviewed 10/21/2024   Allergen Reaction Noted    Shellfish containing products Anaphylaxis 02/10/2023    Ibuprofen Hives 02/10/2023          Current Medications has a current medication list which includes the following prescription(s): albuterol, albuterol, aspirin, dapagliflozin propanediol, ferrous sulfate (325 mg ferrous sulfate), fluticasone propion-salmeterol, fluticasone propion-salmeterol, furosemide, gabapentin, infliximab-dyyb, ipratropium/albuterol sulfate, levocetirizine, mesalamine, methadone hcl, metoprolol succinate xl, nitroglycerin, omeprazole, pravastatin, prednisone, entresto, spironolactone, tamsulosin, and spiriva respimat.       Vitals:   Vitals:    10/21/24 0714 10/21/24 0810 10/21/24 0840 10/21/24 0842   BP: 121/72 121/67 89/58  Comment: nurse notified 113/69  Comment: nurse notified   Pulse: 84 75 67 73   Resp: 16 18 18    Temp: 36.4 °C (97.5 °F) 36.1 °C (97 °F) 36.2 °C (97.2 °F)    SpO2: 94% 95% 92%    Weight: 84.2 kg (185 lb 10 oz)       10/21/24 0855 10/21/24 0925 10/21/24 1005 10/21/24 1027   BP: 114/65 125/67 125/72 91/60   Pulse: 70 63 65 52   Resp: 18 18 16 16   Temp: 36.4 °C (97.6 °F) 36.3 °C (97.4 °F) 36.5 °C (97.7 °F) 36.2 °C (97.2 °F)   SpO2: 92% 92% 91%  Comment: nurse notified 94%   Weight:                 Infusion Pre-procedure Checklist:   - Allergies  reviewed: yes   - Medications reviewed: yes       - Previous reaction to current treatment: no      Assess patient for the concerns below. Document provider notification as appropriate.  - Active or recent infection with/without current antibiotic use: no  - Recent or planned invasive dental work: no  - Recent or planned surgeries: no  - Recently received or plans to receive vaccinations: no  - Has treatment related toxicities: no  - Is pregnant:  n/a      Pain: 0   - Is the pain different from normal: n/a   - Is your Doctor aware:  n/a       Labs: N/A          Fall Risk Screening: Muir Fall Risk  History of Falling, Immediate or Within 3 Months: No  Secondary Diagnosis: No  Ambulatory Aid: Walks without aid/bedrest/nurse assist  Intravenous Therapy/Heparin Lock: Yes  Gait/Transferring: Normal/bedrest/immobile  Mental Status: Oriented to own ability  Muir Fall Risk Score: 20       Review Of Systems:  Review of Systems   Constitutional:  Negative for appetite change, chills, fatigue, fever and unexpected weight change.   HENT:   Negative for hearing loss, mouth sores, sore throat, tinnitus, trouble swallowing and voice change.    Eyes:  Negative for eye problems.   Respiratory:  Negative for cough, shortness of breath and wheezing.    Cardiovascular:  Negative for chest pain, leg swelling and palpitations.   Gastrointestinal:  Positive for blood in stool. Negative for abdominal pain, constipation, diarrhea, nausea and vomiting.        ADMITS TO APPROX. 8- 10 STOOLS PER DAY, LOOSE TO SOFT IN CONSISTENCY  DENIES CONSTIPATION AND DIARRHEA  ADMITS TO COUPLE STOOLS PER DAY CONTAINING MUCOUS AND/OR BLOOD  DENIES ABDOMINAL PAIN, N/V  ADMITS TO NOCTURNAL STOOLING, APPROX. 2 TIMES PER NIGHT   Genitourinary:  Negative for dysuria, frequency and hematuria.    Musculoskeletal:  Negative for arthralgias and myalgias.   Skin:  Negative for itching, rash and wound.   Neurological:  Negative for dizziness, extremity weakness,  "headaches, light-headedness and numbness.   Psychiatric/Behavioral:  Negative for depression. The patient is not nervous/anxious.          ROS completed? Yes      Infusion Readiness:  - Assessment Concerns Related to Infusion: No  - Provider notified: n/a      Document Below Only If Indicated:   New Patient Education:    N/A (returning patient for continuation of therapy. Ongoing education provided as needed.)        Treatment Conditions & Drug Specific Questions:    InFLIXimab  (AVSOLA, INFLECTRA, REMICADE, RENFLEXIS)    (Unless otherwise specified on patient specific therapy plan):     TREATMENT CONDITIONS:  Unless otherwise specified on patient specific therapy plan HOLD and notify Provider prior to proceeding with today's infusion if patient with:  o Positive T-Spot  o Reactive Hep B sAg and/or Hep B Core Antibody    Lab Results   Component Value Date    TBSIN Negative 09/10/2024      Lab Results   Component Value Date    HEPBSAG Nonreactive 09/10/2024      No results found for: \"NONUHFIRE\", \"NONUHSWGH\", \"NONUHFISH\", \"EXTHEPBSAG\"  Lab Results   Component Value Date    HEPBCAB Nonreactive 09/10/2024     Lab Results   Component Value Date    HEPBCIGM Nonreactive 09/10/2024    HEPBCAB Nonreactive 09/10/2024    HEPCAB Nonreactive 09/10/2024        Labs reviewed and patient meets treatment conditions? Yes    DRUG SPECIFIC QUESTIONS:    Any new or worsening signs / symptoms of heart failure which may include things like worsening shortness of breath, swelling, fatigue? No   (If yes notify provider before proceeding with today's infusion. New onset or worsening HF have been reported with infliximab)    REMINDER:   WEIGHT BASED DRUG     Recommended Vitals/Observation:  Vitals:     Induction: Obtain vital signs every 30 minutes; at end of observation period and as needed.     Maintenance: Obtain vital signs at start and end of infusions  Observation:     Induction: Patient is monitored for 30 minutes post-infusion     " Maintenance: No observation.        Weight Based Drug Calculations:    WEIGHT BASED DRUGS: Infliximab (REMICADE, INFLECTRA, RENFLEXIS)   Patient's dosing weight (kg): 88 KG     10% weight variance for prescribed treatment: 79.2 kg to 96.8 kg     Patient's weight today: 84.2  Vitals:    10/21/24 0714   Weight: 84.2 kg (185 lb 10 oz)         weight range for prescribed dose:     Patient weight today falls outside of 10% variance or  weight range: No    [facility] pharmacist informed of weight variance: Not applicable    Doses that are weight based have an acceptable variance rule within 10% of the prescribed   order and/or within  weight range. If patient weight on day of infusion falls   outside of the 10% variance, or weight range, infusion is administered and   pharmacy contacted regarding future dosing adjustments, per policy.         Initiated By: Kaylyn Verdin RN

## 2024-11-04 ENCOUNTER — TELEPHONE (OUTPATIENT)
Dept: PRIMARY CARE | Facility: CLINIC | Age: 64
End: 2024-11-04

## 2024-11-04 ENCOUNTER — LAB (OUTPATIENT)
Dept: LAB | Facility: LAB | Age: 64
End: 2024-11-04
Payer: MEDICAID

## 2024-11-04 DIAGNOSIS — K51.011 ULCERATIVE PANCOLITIS WITH RECTAL BLEEDING (MULTI): ICD-10-CM

## 2024-11-04 DIAGNOSIS — K51.011 ULCERATIVE PANCOLITIS WITH RECTAL BLEEDING (MULTI): Primary | ICD-10-CM

## 2024-11-04 LAB
ALBUMIN SERPL BCP-MCNC: 3 G/DL (ref 3.4–5)
ALP SERPL-CCNC: 78 U/L (ref 33–136)
ALT SERPL W P-5'-P-CCNC: 9 U/L (ref 10–52)
ANION GAP SERPL CALC-SCNC: 12 MMOL/L (ref 10–20)
AST SERPL W P-5'-P-CCNC: 12 U/L (ref 9–39)
BILIRUB DIRECT SERPL-MCNC: 0.1 MG/DL (ref 0–0.3)
BILIRUB SERPL-MCNC: 0.5 MG/DL (ref 0–1.2)
BUN SERPL-MCNC: 8 MG/DL (ref 6–23)
CALCIUM SERPL-MCNC: 8.4 MG/DL (ref 8.6–10.6)
CHLORIDE SERPL-SCNC: 105 MMOL/L (ref 98–107)
CO2 SERPL-SCNC: 31 MMOL/L (ref 21–32)
CREAT SERPL-MCNC: 0.92 MG/DL (ref 0.5–1.3)
CRP SERPL-MCNC: 0.95 MG/DL
EGFRCR SERPLBLD CKD-EPI 2021: >90 ML/MIN/1.73M*2
ERYTHROCYTE [DISTWIDTH] IN BLOOD BY AUTOMATED COUNT: 14.3 % (ref 11.5–14.5)
GLUCOSE SERPL-MCNC: 88 MG/DL (ref 74–99)
HCT VFR BLD AUTO: 41.2 % (ref 41–52)
HGB BLD-MCNC: 12.1 G/DL (ref 13.5–17.5)
MCH RBC QN AUTO: 26 PG (ref 26–34)
MCHC RBC AUTO-ENTMCNC: 29.4 G/DL (ref 32–36)
MCV RBC AUTO: 88 FL (ref 80–100)
NRBC BLD-RTO: 0 /100 WBCS (ref 0–0)
PHOSPHATE SERPL-MCNC: 3.7 MG/DL (ref 2.5–4.9)
PLATELET # BLD AUTO: 301 X10*3/UL (ref 150–450)
POTASSIUM SERPL-SCNC: 4.6 MMOL/L (ref 3.5–5.3)
PROT SERPL-MCNC: 6.2 G/DL (ref 6.4–8.2)
RBC # BLD AUTO: 4.66 X10*6/UL (ref 4.5–5.9)
SODIUM SERPL-SCNC: 143 MMOL/L (ref 136–145)
WBC # BLD AUTO: 7.6 X10*3/UL (ref 4.4–11.3)

## 2024-11-04 PROCEDURE — 86140 C-REACTIVE PROTEIN: CPT

## 2024-11-04 PROCEDURE — 82248 BILIRUBIN DIRECT: CPT

## 2024-11-04 PROCEDURE — 84100 ASSAY OF PHOSPHORUS: CPT

## 2024-11-04 PROCEDURE — 85027 COMPLETE CBC AUTOMATED: CPT

## 2024-11-04 PROCEDURE — 36415 COLL VENOUS BLD VENIPUNCTURE: CPT

## 2024-11-04 PROCEDURE — 80053 COMPREHEN METABOLIC PANEL: CPT

## 2024-11-04 RX ORDER — PREDNISONE 5 MG/1
TABLET ORAL
Qty: 73 TABLET | Refills: 0 | Status: SHIPPED | OUTPATIENT
Start: 2024-11-04 | End: 2024-12-09

## 2024-11-04 NOTE — TELEPHONE ENCOUNTER
Patient called to check if doctor can recommend  a Dentist to him .  Patient question if there was one in this building.

## 2024-11-04 NOTE — PROGRESS NOTES
Pt was called- per him- he is s/p 2 infusions at 0 and 2 weeks for inflectra and his diarrhea has improved as has BRBPR- however he has been on prednisone for 1 week and feels now has an exacerbation of diarrhea and BRBPR that was significantly better on IFX and prednisone- we went over adverse events of steroid including osteoporosis, infections, hyperglycemia, mood changes etc.- and based on shared decision making agreed to proceed with low dose prednisone taper starting at 20mg a week and slow taper by 5mg every week- next infusion is at week 6. I also explained that if he has new worsening abd painm BRBPR or diarrhea, he should report to ED- additionally he will obtain labs today.

## 2024-11-07 ENCOUNTER — LAB (OUTPATIENT)
Dept: LAB | Facility: LAB | Age: 64
End: 2024-11-07
Payer: MEDICAID

## 2024-11-07 DIAGNOSIS — K51.011 ULCERATIVE PANCOLITIS WITH RECTAL BLEEDING (MULTI): ICD-10-CM

## 2024-11-07 PROCEDURE — 87493 C DIFF AMPLIFIED PROBE: CPT

## 2024-11-07 PROCEDURE — 83993 ASSAY FOR CALPROTECTIN FECAL: CPT

## 2024-11-08 LAB — C DIF TOX TCDA+TCDB STL QL NAA+PROBE: NOT DETECTED

## 2024-11-10 LAB — CALPROTECTIN STL-MCNT: 1600 UG/G

## 2024-11-15 DIAGNOSIS — I50.22 CHRONIC SYSTOLIC CONGESTIVE HEART FAILURE: ICD-10-CM

## 2024-11-15 DIAGNOSIS — K50.10 CROHN'S DISEASE OF LARGE INTESTINE WITHOUT COMPLICATION (MULTI): Primary | ICD-10-CM

## 2024-11-15 RX ORDER — ASPIRIN 81 MG/1
81 TABLET ORAL DAILY
Qty: 30 TABLET | Refills: 1 | Status: SHIPPED | OUTPATIENT
Start: 2024-11-15

## 2024-11-18 ENCOUNTER — TELEPHONE (OUTPATIENT)
Dept: GASTROENTEROLOGY | Facility: HOSPITAL | Age: 64
End: 2024-11-18

## 2024-11-18 ENCOUNTER — LAB (OUTPATIENT)
Dept: LAB | Facility: LAB | Age: 64
End: 2024-11-18
Payer: MEDICAID

## 2024-11-18 ENCOUNTER — APPOINTMENT (OUTPATIENT)
Dept: INFUSION THERAPY | Facility: CLINIC | Age: 64
End: 2024-11-18
Payer: MEDICAID

## 2024-11-18 VITALS
OXYGEN SATURATION: 93 % | RESPIRATION RATE: 17 BRPM | SYSTOLIC BLOOD PRESSURE: 128 MMHG | TEMPERATURE: 97.3 F | DIASTOLIC BLOOD PRESSURE: 81 MMHG | BODY MASS INDEX: 26.07 KG/M2 | HEART RATE: 62 BPM | WEIGHT: 186.95 LBS

## 2024-11-18 DIAGNOSIS — K50.819 CROHN'S DISEASE OF SMALL AND LARGE INTESTINES WITH COMPLICATION (MULTI): ICD-10-CM

## 2024-11-18 DIAGNOSIS — K50.10 CROHN'S DISEASE OF LARGE INTESTINE WITHOUT COMPLICATION (MULTI): ICD-10-CM

## 2024-11-18 PROCEDURE — 96413 CHEMO IV INFUSION 1 HR: CPT

## 2024-11-18 PROCEDURE — 96415 CHEMO IV INFUSION ADDL HR: CPT

## 2024-11-18 PROCEDURE — 81335 TPMT GENE COM VARIANTS: CPT

## 2024-11-18 PROCEDURE — 36415 COLL VENOUS BLD VENIPUNCTURE: CPT

## 2024-11-18 RX ORDER — ALBUTEROL SULFATE 0.83 MG/ML
3 SOLUTION RESPIRATORY (INHALATION) AS NEEDED
OUTPATIENT
Start: 2024-11-18

## 2024-11-18 RX ORDER — EPINEPHRINE 0.3 MG/.3ML
0.3 INJECTION SUBCUTANEOUS EVERY 5 MIN PRN
OUTPATIENT
Start: 2024-11-18

## 2024-11-18 RX ORDER — ACETAMINOPHEN 325 MG/1
650 TABLET ORAL ONCE
Status: CANCELLED | OUTPATIENT
Start: 2024-11-18 | End: 2024-11-18

## 2024-11-18 RX ORDER — DIPHENHYDRAMINE HYDROCHLORIDE 50 MG/ML
50 INJECTION INTRAMUSCULAR; INTRAVENOUS AS NEEDED
OUTPATIENT
Start: 2024-11-18

## 2024-11-18 RX ORDER — ACETAMINOPHEN 325 MG/1
650 TABLET ORAL ONCE
Status: COMPLETED | OUTPATIENT
Start: 2024-11-18 | End: 2024-11-18

## 2024-11-18 RX ORDER — FAMOTIDINE 10 MG/ML
20 INJECTION INTRAVENOUS ONCE AS NEEDED
OUTPATIENT
Start: 2024-11-18

## 2024-11-18 ASSESSMENT — ENCOUNTER SYMPTOMS
HEMATURIA: 0
FEVER: 0
UNEXPECTED WEIGHT CHANGE: 0
NERVOUS/ANXIOUS: 0
NAUSEA: 0
NUMBNESS: 0
FATIGUE: 0
EXTREMITY WEAKNESS: 0
FREQUENCY: 0
ABDOMINAL PAIN: 0
LIGHT-HEADEDNESS: 0
VOMITING: 0
ARTHRALGIAS: 0
MYALGIAS: 0
WHEEZING: 0
DIARRHEA: 0
SORE THROAT: 0
EYE PROBLEMS: 0
WOUND: 0
SHORTNESS OF BREATH: 0
PALPITATIONS: 0
TROUBLE SWALLOWING: 0
CHILLS: 0
BLOOD IN STOOL: 1
APPETITE CHANGE: 0
VOICE CHANGE: 0
HEADACHES: 0
LEG SWELLING: 0
COUGH: 0
DIZZINESS: 0
DEPRESSION: 0
DYSURIA: 0
CONSTIPATION: 0

## 2024-11-18 NOTE — PROGRESS NOTES
[facility]   Infusion Clinic Note   Date: 2024   Name: Arnaldo Francisco  : 1960   MRN: [id number]          Reason for Visit: Follow-up and OP Infusion (PT HERE FOR INFLECTRA 400MG 'WEEK 6'/NEXT APT: 8 WEEKS )         Today: We administered acetaminophen and inFLIXimab-dyyb (Inflectra) 400 mg in sodium chloride 0.9% 250 mL IV.       Visit Type: INFUSION       Ordered By: DR. THOMPSON       Accompanied by:Self       Diagnosis: Crohn's disease of small and large intestines with complication (Multi)        Allergies:   Allergies as of 2024 - Reviewed 2024   Allergen Reaction Noted    Shellfish containing products Anaphylaxis 02/10/2023    Ibuprofen Hives 02/10/2023          Current Medications has a current medication list which includes the following prescription(s): albuterol, albuterol, aspirin, dapagliflozin propanediol, ferrous sulfate (325 mg ferrous sulfate), fluticasone propion-salmeterol, fluticasone propion-salmeterol, furosemide, gabapentin, infliximab-dyyb, ipratropium/albuterol sulfate, levocetirizine, mesalamine, methadone hcl, metoprolol succinate xl, nitroglycerin, omeprazole, pravastatin, prednisone, entresto, spironolactone, tamsulosin, and spiriva respimat.       Vitals:   Vitals:    24 0800 24 0857 24 0927 24 1012   BP: 130/78 120/80 114/75 111/75   Pulse: 87 71 71 63   Resp: 16 16 16 17   Temp: 36.7 °C (98.1 °F) 36.3 °C (97.3 °F) 36.3 °C (97.3 °F) 36.2 °C (97.2 °F)   SpO2:  90%  Comment: nurse notified 90% 93%   Weight: 84.8 kg (186 lb 15.2 oz)       24 1100   BP: 128/81   Pulse: 62   Resp: 17   Temp: 36.3 °C (97.3 °F)   SpO2: 93%   Weight:              Infusion Pre-procedure Checklist:   - Allergies reviewed: yes   - Medications reviewed: yes       - Previous reaction to current treatment: no      Assess patient for the concerns below. Document provider notification as appropriate.  - Active or recent infection  with/without current antibiotic use: no  - Recent or planned invasive dental work: no  - Recent or planned surgeries: no  - Recently received or plans to receive vaccinations: no  - Has treatment related toxicities: no  - Is pregnant:  n/a      Pain: 0   - Is the pain different from normal: n/a   - Is your Doctor aware:  n/a       Labs: N/A          Fall Risk Screening: Muir Fall Risk  History of Falling, Immediate or Within 3 Months: No  Secondary Diagnosis: No  Ambulatory Aid: Walks without aid/bedrest/nurse assist  Intravenous Therapy/Heparin Lock: No  Gait/Transferring: Normal/bedrest/immobile  Mental Status: Oriented to own ability  Muir Fall Risk Score: 0       Review Of Systems:  Review of Systems   Constitutional:  Negative for appetite change, chills, fatigue, fever and unexpected weight change.   HENT:   Negative for hearing loss, mouth sores, sore throat, tinnitus, trouble swallowing and voice change.    Eyes:  Negative for eye problems.   Respiratory:  Negative for cough, shortness of breath and wheezing.    Cardiovascular:  Negative for chest pain, leg swelling and palpitations.   Gastrointestinal:  Positive for blood in stool. Negative for abdominal pain, constipation, diarrhea, nausea and vomiting.        ADMITS TO APPROX. 8 STOOLS PER DAY, LOOSE TO SOFT IN CONSISTENCY  DENIES CONSTIPATION AND DIARRHEA  ADMITS TO COUPLE STOOLS PER DAY CONTAINING MUCOUS AND/OR BLOOD  DENIES ABDOMINAL PAIN, N/V  ADMITS TO NOCTURNAL STOOLING, APPROX. 2 TIMES PER NIGHT   Genitourinary:  Negative for dysuria, frequency and hematuria.    Musculoskeletal:  Negative for arthralgias and myalgias.   Skin:  Negative for itching, rash and wound.   Neurological:  Negative for dizziness, extremity weakness, headaches, light-headedness and numbness.   Psychiatric/Behavioral:  Negative for depression. The patient is not nervous/anxious.          ROS completed? Yes      Infusion Readiness:  - Assessment Concerns Related to Infusion:  "No  - Provider notified: n/a      Document Below Only If Indicated:   New Patient Education:    N/A (returning patient for continuation of therapy. Ongoing education provided as needed.)        Treatment Conditions & Drug Specific Questions:    InFLIXimab  (AVSOLA, INFLECTRA, REMICADE, RENFLEXIS)    (Unless otherwise specified on patient specific therapy plan):     TREATMENT CONDITIONS:  Unless otherwise specified on patient specific therapy plan HOLD and notify Provider prior to proceeding with today's infusion if patient with:  o Positive T-Spot  o Reactive Hep B sAg and/or Hep B Core Antibody    Lab Results   Component Value Date    TBSIN Negative 09/10/2024      Lab Results   Component Value Date    HEPBSAG Nonreactive 09/10/2024      No results found for: \"NONUHFIRE\", \"NONUHSWGH\", \"NONUHFISH\", \"EXTHEPBSAG\"  Lab Results   Component Value Date    HEPBCAB Nonreactive 09/10/2024     Lab Results   Component Value Date    HEPBCIGM Nonreactive 09/10/2024    HEPBCAB Nonreactive 09/10/2024    HEPCAB Nonreactive 09/10/2024        Labs reviewed and patient meets treatment conditions? Yes    DRUG SPECIFIC QUESTIONS:    Any new or worsening signs / symptoms of heart failure which may include things like worsening shortness of breath, swelling, fatigue? No   (If yes notify provider before proceeding with today's infusion. New onset or worsening HF have been reported with infliximab)    REMINDER:   WEIGHT BASED DRUG     Recommended Vitals/Observation:  Vitals:     Induction: Obtain vital signs every 30 minutes; at end of observation period and as needed.     Maintenance: Obtain vital signs at start and end of infusions  Observation:     Induction: Patient is monitored for 30 minutes post-infusion     Maintenance: No observation.        Weight Based Drug Calculations:    WEIGHT BASED DRUGS: Infliximab (REMICADE, INFLECTRA, RENFLEXIS)   Patient's dosing weight (kg): 88 KG     10% weight variance for prescribed treatment: 79.2 " kg to 96.8 kg     Patient's weight today: 84.8  Vitals:    11/18/24 0800   Weight: 84.8 kg (186 lb 15.2 oz)        Patient weight today falls outside of 10% variance or  weight range: No     Home Care pharmacist informed of weight variance: Not applicable    Doses that are weight based have an acceptable variance rule within 10% of the prescribed   order and/or within  weight range. If patient weight on day of infusion falls   outside of the 10% variance, or weight range, infusion is administered and   pharmacy contacted regarding future dosing adjustments, per policy.         Initiated By: Francois Walters RN

## 2024-11-18 NOTE — PATIENT INSTRUCTIONS
Today :Arnaldo Francisco had no medications administered during this visit.     For:   1. Crohn's disease of small and large intestines with complication (Multi)         Your next appointment is due in:  8 WEEKS         Please read the  Medication Guide that was given to you and reviewed during todays visit.     (Tell all doctors including dentists that you are taking this medication)     Go to the emergency room or call 911 if:  -You have signs of allergic reaction:   -Rash, hives, itching.   -Swollen, blistered, peeling skin.   -Swelling of face, lips, mouth, tongue or throat.   -Tightness of chest, trouble breathing, swallowing or talking     Call your doctor:  - If IV / injection site gets red, warm, swollen, itchy or leaks fluid or pus.     (Leave dressing on your IV site for at least 2 hours and keep area clean and dry  - If you get sick or have symptoms of infection or are not feeling well for any reason.    (Wash your hands often, stay away from people who are sick)  - If you have side effects from your medication that do not go away or are bothersome.     (Refer to the teaching your nurse gave you for side effects to call your doctor about)    - Common side effects may include:  stuffy nose, headache, feeling tired, muscle aches, upset stomach  - Before receiving any vaccines     - Call the [facility] at   If:  - You get sick, are on antibiotics, have had a recent vaccine, have surgery or dental work and your doctor wants your visit rescheduled.  - You need to cancel and reschedule your visit for any reason. Call at least 2 days before your visit if you need to cancel.   - Your insurance changes before your next visit.    (We will need to get approval from your new insurance. This can take up to two weeks.)     The [facility] is opened Monday thru Friday. We are closed on weekends and holidays.   Voice mail will take your call if the center is closed. If you leave a  message please allow 24 hours for a call back during weekdays. If you leave a message on a weekend/holiday, we will call you back the next business day.    A pharmacist is available Monday - Friday from 8:30AM to 3:30PM to help answer any questions you may have about your prescriptions(s). Please call pharmacy at:    [facility]: [phone]  [facility]: [phone]  [facility]: [phone]

## 2024-11-18 NOTE — TELEPHONE ENCOUNTER
Pt was called and informed that fecal calpro was still high at 1600- pt has had 2 doses at weeks 0 and 2 of IFX, and was due to for 3rd dose last week- he feels overall improved- diarrhea and BRBPR have improved- I have ordered TPMT testing- once back we will consider initiation of AZA for combination therapy as well..,

## 2024-11-25 DIAGNOSIS — R33.8 BENIGN PROSTATIC HYPERPLASIA WITH URINARY RETENTION: ICD-10-CM

## 2024-11-25 DIAGNOSIS — N40.1 BENIGN PROSTATIC HYPERPLASIA WITH URINARY RETENTION: ICD-10-CM

## 2024-11-26 LAB — SCAN RESULT: NORMAL

## 2024-11-26 RX ORDER — TAMSULOSIN HYDROCHLORIDE 0.4 MG/1
0.4 CAPSULE ORAL DAILY
Qty: 90 CAPSULE | Refills: 3 | Status: SHIPPED | OUTPATIENT
Start: 2024-11-26

## 2024-12-03 ENCOUNTER — OFFICE VISIT (OUTPATIENT)
Dept: GASTROENTEROLOGY | Facility: HOSPITAL | Age: 64
End: 2024-12-03
Payer: MEDICAID

## 2024-12-03 VITALS
SYSTOLIC BLOOD PRESSURE: 119 MMHG | TEMPERATURE: 96.7 F | HEART RATE: 71 BPM | OXYGEN SATURATION: 93 % | DIASTOLIC BLOOD PRESSURE: 78 MMHG

## 2024-12-03 DIAGNOSIS — R14.0 BLOATING: ICD-10-CM

## 2024-12-03 DIAGNOSIS — K86.2 PANCREATIC CYST (HHS-HCC): ICD-10-CM

## 2024-12-03 DIAGNOSIS — K52.9 COLITIS: Primary | ICD-10-CM

## 2024-12-03 PROCEDURE — 1036F TOBACCO NON-USER: CPT | Performed by: STUDENT IN AN ORGANIZED HEALTH CARE EDUCATION/TRAINING PROGRAM

## 2024-12-03 PROCEDURE — 3078F DIAST BP <80 MM HG: CPT | Performed by: STUDENT IN AN ORGANIZED HEALTH CARE EDUCATION/TRAINING PROGRAM

## 2024-12-03 PROCEDURE — 99215 OFFICE O/P EST HI 40 MIN: CPT | Performed by: STUDENT IN AN ORGANIZED HEALTH CARE EDUCATION/TRAINING PROGRAM

## 2024-12-03 PROCEDURE — 3074F SYST BP LT 130 MM HG: CPT | Performed by: STUDENT IN AN ORGANIZED HEALTH CARE EDUCATION/TRAINING PROGRAM

## 2024-12-03 RX ORDER — SIMETHICONE 80 MG
80 TABLET,CHEWABLE ORAL EVERY 6 HOURS PRN
Qty: 30 TABLET | Refills: 0 | Status: SHIPPED | OUTPATIENT
Start: 2024-12-03 | End: 2025-01-02

## 2024-12-03 RX ORDER — LOPERAMIDE HYDROCHLORIDE 2 MG/1
2 CAPSULE ORAL 3 TIMES DAILY PRN
Qty: 30 CAPSULE | Refills: 1 | Status: SHIPPED | OUTPATIENT
Start: 2024-12-03 | End: 2025-01-02

## 2024-12-03 RX ORDER — AZATHIOPRINE 50 MG/1
TABLET ORAL
Qty: 194 TABLET | Refills: 0 | Status: SHIPPED | OUTPATIENT
Start: 2024-12-03 | End: 2025-03-17

## 2024-12-03 ASSESSMENT — PAIN SCALES - GENERAL: PAINLEVEL_OUTOF10: 0-NO PAIN

## 2024-12-03 ASSESSMENT — LIFESTYLE VARIABLES
HOW OFTEN DO YOU HAVE SIX OR MORE DRINKS ON ONE OCCASION: NEVER
HOW OFTEN DO YOU HAVE A DRINK CONTAINING ALCOHOL: NEVER
AUDIT-C TOTAL SCORE: 0
SKIP TO QUESTIONS 9-10: 1
HOW MANY STANDARD DRINKS CONTAINING ALCOHOL DO YOU HAVE ON A TYPICAL DAY: PATIENT DOES NOT DRINK

## 2024-12-03 NOTE — PATIENT INSTRUCTIONS
Thank you for coming in to clinic today, we will:    -continue with infliximab 5mg/kg  q8 weeks  -please obtain infliximab level prior to next infusion  - please start azathioprine 50mg once daily for colitis, if no nausea or vomiting or any other new symptoms, increase dose to 100mg once daily after 2 weeks  -we have ordered MRI/MRCP for annual surveillance of pancreatic cyst  -repeat colonoscopy in 2025 for endoscopic disease activity assessment of Crohn's colitis  -please see dermatology for skin cancer screening (referral made)  -please start imodium 2mg three times a day as needed for diarrhea  -start simethicone 80mg every 6 hours as needed for bloating    We will see you back in 3 months

## 2024-12-03 NOTE — PROGRESS NOTES
This is a 64 y.o. 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     Past hx:     Patient was diagnosed with Crohn's disease in 2001 with presenting sx of BRBPR. He was on asacol for several years before being switched to sulfasalazine. He had been on sulfasalazine 1.5g a day. He had never been on any biologics or immunomodulators. Fecal calprotectin in June 2021 was 1680 and he was started on lialda in June 2021. Currently on Lialda 2.4 gr a day.     He did well until late 2024 when he had bloody diarrhea and colonoscopy showed severe L sided colitis w/ new flare sx- required prednisone taper and was transitioned to IFX in Oct 2024.     Other GI problems:      - Intraductal papillary mucinous neoplasms (IPMN)   *MRI pancreas 8/2022: unchanged [id number] panc uncinate process septated cystic lesion, likely side branch IPMN  *MRI pancreas w/wo contrast 6/2021: 1. No pancreatic mass. No biliary ductal dilatation or pancreatic duct dilatation. No choledocholithiasis. 2. A 2 cm septated cyst in the uncinate process, does not communicate with the main pancreatic duct, may represent side branch IPMN. No abnormal enhancement. Follow-up.  *He saw surgery follow up and they ordered surveillance MRI/MRCP on Nov 2023 which showed:  Stable cystic nonenhancing pancreatic uncinate lesion, likely representing a side-branch IPMN. Mild increase in diffuse prominence of the pancreatic duct, of doubtful clinical significance given lack of additional new findings. Mild interval increase in prominence of the extrahepatic bile duct without evidence of an obstructive mass.   *EUS FNA by Dr. Pettit in Dec 2023: CBD  was dilated to 10 mm without stricture or choledocholithiasis. The main pancreatic duct was dilated in the HoP up to 6 mm. The ampulla was normal without evidence of main duct involvement. His known pancreatic cyst was without high risk features. Cytology was negative for malignant cells. Fluid studies are consistent with a mucinous cyst, as known. Seen by surgery and they plan to perform annual MRI/MRCP surveillance.     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   *The Ab susceptibility testing showed that we should use bismuth quadruple or clarithromycin triple regimens both of which have been used in the past.   *He saw Dr. Ferraro in Feb 2024 for resistant HP and he recommended Quadruple therapy with the following regimen for 28 days: Amoxicillin 1 gm BID, Tetracycline 500 mg QID, Metronidazole 500 mg QID and Omeprazole 20 mg BID. Stool HP antigen in July 2024 was negative.     Interval hx:    Per pt, he has had some improvement since initiating IFX and is now s/p 3 doses at weeks 0, 2 and 6.     He still has 7-10 loose brown Bms a day improved form 10-15, with occasional scant intermittent BRBPR. Endorses occasional mild abd pain. He is eating well. Endorses occasional one nocturnal BM, flatulence and bloating- denies urgency or accidents. Endorses chronic fatigue.    Denies dysphagia, odynophagia, f/c, n/v, melena, wt loss, 12 point ROS done and neg unless otherwise stated.    Extraintestinal IBD manifestations: denies mucositis, skin rash, eye pain or redness; endorses chronic lower joint pains.    Labs 9-11 2024: TPMT genotyping normal, fecal calpro 1600, Cr 0.92, LFTs WNL, CRP 0.95, hb 12.1, plt 301, Hep B/C and TB neg    Labs Feb 2024: Hb 15.8, plt 219, LFTs WNL, Cr 0.96     Labs Aug-Nov 2023: Hb 14.9, plt 230, CA  19-9 elevated at 49, H.pylori breath test pos in Sept 2023, ferritin 79, TIBC% 21%, Cr 1.03, LFTs WNL     Labs May 2023: Hb 13.6, plt 223, CRP 0.23, Fecal calpro 111,      Labs April 2023: Hb 11.5, plt 278, ferritin 92, TIBC% 11% Cr 1.15, LFTs WNL, July 2021: CRP WNL, June 2021: fecal calpro 1680      CTE 6/2021: No evidence of acute inflammatory change of the large or small bowel. The terminal ileum appears normal. No stricture, obstruction, fistula, or abscess. Prominent colonic stool noted, correlate for constipation.        Endoscopic Hx:  C-scope 5/2021: - The distal rectum and anal verge are normal on retroflexion view. Four 2 to 3 mm polyps in the descend ing colon, removed with a cold biopsy forceps. One 3 mm polyp in the ascending colon, removed with a cold biopsy forceps. Resected and retrieved (path: adenoma w/ LGD). Erythematous mucosa at the appendiceal orifice. Biopsied. Regional biopsies obtained in four quadrant fashion from 70cm to 10 cm- at least 32 biopsies obtained in total only visibile abnormalities: pseudopolyps throughout the colon. erythematous and friable Ao. scarring significant from transverse colon to sigmoid (path: no e/o granulomas or dysplasia or active colitis). Tried to get into TI but could not.      EGD July 2023: HP gastritis.     C-scope July 2023: Endoscopically mild inflammation from transverse to rectum, histologically normal entire colon except for chronic quiescent colitis in rectum.    C-scope Aug 2024: Severe L sided colitis.     PMH/PSH: As above        Family history:  Denies family history of gastrointestinal cancers     Social history:  Reports that he quit smoking about 21 years ago. His smoking use included cigarettes. He started smoking about 41 years ago. He has a 20 pack-year smoking history. Denies alcohol or IVDU: Heroin, quit in 2014.        Physical exam:  Constitutional: Well-developed male in no acute distress.  HEENT: NC/AT, sclera anicteric  Respiratory:  CTAB. No wheezes, rales, or rhonchi. Normal respiratory effort.  Cardiovascular: RRR. No murmurs, gallops, or rubs.  Abdominal: Soft, nondistended, nontender to palpation. Bowel sounds present. No hepatosplenomegaly or masses.   Neuro: AAOx3. CN II-XII grossly intact. Tongue midline, no facial droop  MSK: No LE edema bilaterally. Moving extremities well  Skin: Warm, dry. No rashes or wounds.  Psych: Appropriate mood and affect.        Assessment and Plan:  This is a 64 y.o. 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    He has had some improvement in diarrhea and BRBPR with 3 inductions doses of IFX but continues to have sx. At this point, we will add AZA for combination therapy and obtain IFX trough level at week 14 to assess for therapeutic level and antibodies.    I revisited AEs on anti-TNFs: including injection site reaction, infection, reactivation of latent TB ad hep B, melanoma, NHL, HTSCL (When combined with a thiopurine), drug induced lupus, psoriasis and eczema among others as well as AEs of thiopurines: inlcuding nausea/vomiting, hepatitis, leukpenia, pancreatitis, infection, NHL, NMSC, cervical dysplasia (in women) among others.      Plan:  -continue with infliximab 5mg/kg  q8 weeks  -please obtain infliximab level prior to next infusion  - please start azathioprine 50mg once daily for colitis, if no nausea or vomiting or any other new symptoms, increase dose to 100mg once daily after 2 weeks  -we have ordered MRI/MRCP for annual surveillance of pancreatic cyst  -repeat colonoscopy in 2025 for endoscopic disease activity assessment of Crohn's colitis  -please see dermatology  for skin cancer screening (referral made)  -please start imodium 2mg three times a day as needed for diarrhea  -start simethicone 80mg every 6 hours as needed for bloating    We will see you back in 3 months     Health maintenance in IBD: reference for PCP     -Vaccinations: counseled that it is recommended to get COVID-19 (including boosters), inactivated flu, pneumococcal, zoster (shingrix) vaccine, RSV vaccine.     -Osteoporosis screening by DEXA scan in patients with IBD if any risk factors for osteoporosis (low BMI, >3 mths cumulative steroid exposure, post-menopausal,hypogonadism). Repeat in 5 yrs if initial screen is normal--->>defer DEXA scan till next appointment     -Screen all patients with IBD for depression (PHQ9) and anxiety (GAD7) at baseline and annually---->>pt denies s/s of depression  ----------- I will follow him for management of his chronic IBD--------

## 2024-12-05 ENCOUNTER — APPOINTMENT (OUTPATIENT)
Dept: PRIMARY CARE | Facility: CLINIC | Age: 64
End: 2024-12-05
Payer: MEDICAID

## 2024-12-08 ENCOUNTER — HOSPITAL ENCOUNTER (EMERGENCY)
Facility: HOSPITAL | Age: 64
Discharge: HOME | End: 2024-12-09
Attending: STUDENT IN AN ORGANIZED HEALTH CARE EDUCATION/TRAINING PROGRAM
Payer: MEDICAID

## 2024-12-08 ENCOUNTER — APPOINTMENT (OUTPATIENT)
Dept: RADIOLOGY | Facility: HOSPITAL | Age: 64
End: 2024-12-08
Payer: MEDICAID

## 2024-12-08 ENCOUNTER — CLINICAL SUPPORT (OUTPATIENT)
Dept: EMERGENCY MEDICINE | Facility: HOSPITAL | Age: 64
End: 2024-12-08
Payer: MEDICAID

## 2024-12-08 DIAGNOSIS — J44.1 COPD EXACERBATION (MULTI): Primary | ICD-10-CM

## 2024-12-08 DIAGNOSIS — R55 SYNCOPE AND COLLAPSE: ICD-10-CM

## 2024-12-08 LAB
ALBUMIN SERPL BCP-MCNC: 3.4 G/DL (ref 3.4–5)
ALP SERPL-CCNC: 79 U/L (ref 33–136)
ALT SERPL W P-5'-P-CCNC: 9 U/L (ref 10–52)
ANION GAP SERPL CALC-SCNC: 9 MMOL/L (ref 10–20)
AST SERPL W P-5'-P-CCNC: 15 U/L (ref 9–39)
BASOPHILS # BLD AUTO: 0.04 X10*3/UL (ref 0–0.1)
BASOPHILS NFR BLD AUTO: 0.6 %
BILIRUB SERPL-MCNC: 0.6 MG/DL (ref 0–1.2)
BNP SERPL-MCNC: 71 PG/ML (ref 0–99)
BUN SERPL-MCNC: 10 MG/DL (ref 6–23)
CALCIUM SERPL-MCNC: 8.8 MG/DL (ref 8.6–10.6)
CARDIAC TROPONIN I PNL SERPL HS: 41 NG/L (ref 0–53)
CARDIAC TROPONIN I PNL SERPL HS: 42 NG/L (ref 0–53)
CHLORIDE SERPL-SCNC: 103 MMOL/L (ref 98–107)
CO2 SERPL-SCNC: 32 MMOL/L (ref 21–32)
CREAT SERPL-MCNC: 0.86 MG/DL (ref 0.5–1.3)
EGFRCR SERPLBLD CKD-EPI 2021: >90 ML/MIN/1.73M*2
EOSINOPHIL # BLD AUTO: 0.23 X10*3/UL (ref 0–0.7)
EOSINOPHIL NFR BLD AUTO: 3.5 %
ERYTHROCYTE [DISTWIDTH] IN BLOOD BY AUTOMATED COUNT: 14 % (ref 11.5–14.5)
FLUAV RNA RESP QL NAA+PROBE: NOT DETECTED
FLUBV RNA RESP QL NAA+PROBE: NOT DETECTED
GLUCOSE SERPL-MCNC: 82 MG/DL (ref 74–99)
HCT VFR BLD AUTO: 40.8 % (ref 41–52)
HGB BLD-MCNC: 12.1 G/DL (ref 13.5–17.5)
IMM GRANULOCYTES # BLD AUTO: 0.01 X10*3/UL (ref 0–0.7)
IMM GRANULOCYTES NFR BLD AUTO: 0.2 % (ref 0–0.9)
LYMPHOCYTES # BLD AUTO: 2.82 X10*3/UL (ref 1.2–4.8)
LYMPHOCYTES NFR BLD AUTO: 42.6 %
MCH RBC QN AUTO: 24.6 PG (ref 26–34)
MCHC RBC AUTO-ENTMCNC: 29.7 G/DL (ref 32–36)
MCV RBC AUTO: 83 FL (ref 80–100)
MONOCYTES # BLD AUTO: 0.66 X10*3/UL (ref 0.1–1)
MONOCYTES NFR BLD AUTO: 10 %
NEUTROPHILS # BLD AUTO: 2.86 X10*3/UL (ref 1.2–7.7)
NEUTROPHILS NFR BLD AUTO: 43.1 %
NRBC BLD-RTO: 0 /100 WBCS (ref 0–0)
PLATELET # BLD AUTO: 253 X10*3/UL (ref 150–450)
POTASSIUM SERPL-SCNC: 3.7 MMOL/L (ref 3.5–5.3)
PROT SERPL-MCNC: 6.9 G/DL (ref 6.4–8.2)
RBC # BLD AUTO: 4.92 X10*6/UL (ref 4.5–5.9)
SARS-COV-2 RNA RESP QL NAA+PROBE: NOT DETECTED
SODIUM SERPL-SCNC: 140 MMOL/L (ref 136–145)
WBC # BLD AUTO: 6.6 X10*3/UL (ref 4.4–11.3)

## 2024-12-08 PROCEDURE — 70450 CT HEAD/BRAIN W/O DYE: CPT | Performed by: STUDENT IN AN ORGANIZED HEALTH CARE EDUCATION/TRAINING PROGRAM

## 2024-12-08 PROCEDURE — 93005 ELECTROCARDIOGRAM TRACING: CPT

## 2024-12-08 PROCEDURE — 85025 COMPLETE CBC W/AUTO DIFF WBC: CPT | Performed by: STUDENT IN AN ORGANIZED HEALTH CARE EDUCATION/TRAINING PROGRAM

## 2024-12-08 PROCEDURE — 84075 ASSAY ALKALINE PHOSPHATASE: CPT | Performed by: EMERGENCY MEDICINE

## 2024-12-08 PROCEDURE — 71046 X-RAY EXAM CHEST 2 VIEWS: CPT

## 2024-12-08 PROCEDURE — 36415 COLL VENOUS BLD VENIPUNCTURE: CPT

## 2024-12-08 PROCEDURE — 2500000001 HC RX 250 WO HCPCS SELF ADMINISTERED DRUGS (ALT 637 FOR MEDICARE OP): Mod: SE

## 2024-12-08 PROCEDURE — 36415 COLL VENOUS BLD VENIPUNCTURE: CPT | Performed by: EMERGENCY MEDICINE

## 2024-12-08 PROCEDURE — 94640 AIRWAY INHALATION TREATMENT: CPT

## 2024-12-08 PROCEDURE — 83880 ASSAY OF NATRIURETIC PEPTIDE: CPT

## 2024-12-08 PROCEDURE — 70450 CT HEAD/BRAIN W/O DYE: CPT

## 2024-12-08 PROCEDURE — 85025 COMPLETE CBC W/AUTO DIFF WBC: CPT | Performed by: EMERGENCY MEDICINE

## 2024-12-08 PROCEDURE — 2500000002 HC RX 250 W HCPCS SELF ADMINISTERED DRUGS (ALT 637 FOR MEDICARE OP, ALT 636 FOR OP/ED): Mod: SE

## 2024-12-08 PROCEDURE — 84484 ASSAY OF TROPONIN QUANT: CPT

## 2024-12-08 PROCEDURE — 71046 X-RAY EXAM CHEST 2 VIEWS: CPT | Performed by: STUDENT IN AN ORGANIZED HEALTH CARE EDUCATION/TRAINING PROGRAM

## 2024-12-08 PROCEDURE — 99285 EMERGENCY DEPT VISIT HI MDM: CPT | Mod: 25 | Performed by: STUDENT IN AN ORGANIZED HEALTH CARE EDUCATION/TRAINING PROGRAM

## 2024-12-08 PROCEDURE — 2500000004 HC RX 250 GENERAL PHARMACY W/ HCPCS (ALT 636 FOR OP/ED): Mod: SE

## 2024-12-08 PROCEDURE — 80053 COMPREHEN METABOLIC PANEL: CPT | Performed by: STUDENT IN AN ORGANIZED HEALTH CARE EDUCATION/TRAINING PROGRAM

## 2024-12-08 PROCEDURE — 87636 SARSCOV2 & INF A&B AMP PRB: CPT

## 2024-12-08 RX ORDER — IPRATROPIUM BROMIDE AND ALBUTEROL SULFATE 2.5; .5 MG/3ML; MG/3ML
3 SOLUTION RESPIRATORY (INHALATION) EVERY 20 MIN
Status: COMPLETED | OUTPATIENT
Start: 2024-12-08 | End: 2024-12-08

## 2024-12-08 RX ORDER — AZITHROMYCIN 500 MG/1
500 TABLET, FILM COATED ORAL ONCE
Status: COMPLETED | OUTPATIENT
Start: 2024-12-08 | End: 2024-12-08

## 2024-12-08 RX ORDER — IPRATROPIUM BROMIDE AND ALBUTEROL SULFATE 2.5; .5 MG/3ML; MG/3ML
3 SOLUTION RESPIRATORY (INHALATION)
Status: DISCONTINUED | OUTPATIENT
Start: 2024-12-08 | End: 2024-12-08

## 2024-12-08 RX ORDER — PREDNISONE 20 MG/1
40 TABLET ORAL ONCE
Status: COMPLETED | OUTPATIENT
Start: 2024-12-08 | End: 2024-12-08

## 2024-12-08 SDOH — HEALTH STABILITY: MENTAL HEALTH: HAVE YOU EVER DONE ANYTHING, STARTED TO DO ANYTHING, OR PREPARED TO DO ANYTHING TO END YOUR LIFE?: NO

## 2024-12-08 SDOH — HEALTH STABILITY: MENTAL HEALTH: BEHAVIORAL HEALTH(WDL): WITHIN DEFINED LIMITS

## 2024-12-08 SDOH — HEALTH STABILITY: MENTAL HEALTH: HAVE YOU WISHED YOU WERE DEAD OR WISHED YOU COULD GO TO SLEEP AND NOT WAKE UP?: NO

## 2024-12-08 SDOH — HEALTH STABILITY: MENTAL HEALTH: HAVE YOU ACTUALLY HAD ANY THOUGHTS OF KILLING YOURSELF?: NO

## 2024-12-08 SDOH — HEALTH STABILITY: MENTAL HEALTH: SUICIDE ASSESSMENT: ADULT (C-SSRS)

## 2024-12-08 ASSESSMENT — COLUMBIA-SUICIDE SEVERITY RATING SCALE - C-SSRS
6. HAVE YOU EVER DONE ANYTHING, STARTED TO DO ANYTHING, OR PREPARED TO DO ANYTHING TO END YOUR LIFE?: NO
2. HAVE YOU ACTUALLY HAD ANY THOUGHTS OF KILLING YOURSELF?: NO
1. IN THE PAST MONTH, HAVE YOU WISHED YOU WERE DEAD OR WISHED YOU COULD GO TO SLEEP AND NOT WAKE UP?: NO

## 2024-12-08 ASSESSMENT — PAIN SCALES - GENERAL
PAINLEVEL_OUTOF10: 0 - NO PAIN
PAINLEVEL_OUTOF10: 0 - NO PAIN

## 2024-12-08 ASSESSMENT — PAIN - FUNCTIONAL ASSESSMENT: PAIN_FUNCTIONAL_ASSESSMENT: 0-10

## 2024-12-08 NOTE — ED TRIAGE NOTES
Productive Cough x 2 week. Pt states today he laid down on the bed and when he woke uop he was on the floor, he is unsure if he passed out, pt denies any trauma to his head at this time

## 2024-12-09 VITALS
HEART RATE: 68 BPM | OXYGEN SATURATION: 94 % | HEIGHT: 71 IN | SYSTOLIC BLOOD PRESSURE: 144 MMHG | WEIGHT: 190 LBS | BODY MASS INDEX: 26.6 KG/M2 | DIASTOLIC BLOOD PRESSURE: 98 MMHG | RESPIRATION RATE: 18 BRPM | TEMPERATURE: 97.9 F

## 2024-12-09 RX ORDER — AZITHROMYCIN 250 MG/1
250 TABLET, FILM COATED ORAL DAILY
Qty: 5 TABLET | Refills: 0 | Status: SHIPPED | OUTPATIENT
Start: 2024-12-09 | End: 2024-12-14

## 2024-12-09 RX ORDER — PREDNISONE 20 MG/1
40 TABLET ORAL DAILY
Qty: 10 TABLET | Refills: 0 | Status: SHIPPED | OUTPATIENT
Start: 2024-12-09 | End: 2024-12-14

## 2024-12-09 NOTE — ED PROVIDER NOTES
Emergency Department Transition of Care Note       Signout   I received Arnaldo Francisco in signout from Dr. Montana.  Please see the ED Provider Note for all HPI, PE and MDM up to the time of signout at 2300.  This is in addition to the primary record.    In brief Arnaldo Francisco is an 64 y.o. male presenting for Cough, syncope    At the time of signout we were awaiting:  Laboratory and imaging studies    ED Course & Medical Decision Making   Medical Decision Making:  Under my care the patient remained hemodynamically stable.  Laboratory studies ordered by previous provider have been reviewed and are without acute process today.  Chest x-ray is without ricki evidence of pneumonia, pneumothorax, or widened mediastinum.  CT of the head shows no acute intracranial process.  The patient was treated with DuoNebs, azithromycin, and prednisone in the emergency department.  Upon discussion with the patient he is feeling much better from a shortness of breath standpoint has no chest pain.  It was discussed with the patient that he should stay in the hospital for further syncope workup but he does not wish to do so.  The patient will be discharged home in stable condition with a prescription for azithromycin and prednisone.  He was given ED return precautions.  Patient was discharged home in stable condition with appropriate outpatient follow-up care.    ED Course:  ED Course as of 12/11/24 0129   Sun Dec 08, 2024   2137 Troponin I, High Sensitivity (CMC): 42 [KR]   2137 BNP: 71 [KR]   2200 XR chest 2 views  Chest x-ray showing emphysema changes, no focal consolidation or pleural effusion [KR]   2308 ECG 12 lead  Sinus rhythm rate 76, right axis, no acute ST segment elevation or depression, minimal criteria for LVH, , , QTc 465.  Overall morphology unchanged compared to prior from March 2022. [KR]   Mon Dec 09, 2024   0000 Attending summary:  63 y/o M w/ PMHx HTN, COPD on home nocturnal O2, CHF w/ EF 40%,  Crohns, CHARLENE who is presenting for 2 weeks of cough and a fall. He reports 2 weeks of productive cough. No dyspnea, chest pain, leg edema or tenderness. States he went to bed today and then woke up on the floor. Did not feel unwell prior to going to bed. Vitals grossly unremarkable on arrival. Exam shows nontoxic appearing male in no resp distress with scattered end expiratory wheezing but moving good air, soft nontender abdomen, no LE edema or tenderness and AO x4. No midline C spine tenderness, no signs of trauma.     Productive cough likely from COPD exacerbation. No signs of bacterial PNA on CXR. Could be viral URI as well, neg COVID/flu swab here. Treated for COPD exacerbation with duonebs, steroids and azithromycin. I think mostly likely pt fell out of bed while sleeping and did not syncopize, however history not fully clear. CT head obtained from a trauma perspective and no ICH seen on my head. [place] C spine neg. Engaged in shared decision making regarding syncope admission and pt preferred outpt work-up. Given strict return precautions. Anticipate dc home if CT head read neg.  [SS]      ED Course User Index  [KR] Phoebe Montana DO  [SS] Karolina Koehler MD         Diagnoses as of 12/11/24 0129   COPD exacerbation (Multi)   Syncope and collapse       Disposition   As a result of the work-up, the patient was discharged home.  he was informed of his diagnosis and instructed to come back with any concerns or worsening of condition.  he and was agreeable to the plan as discussed above.  he was given the opportunity to ask questions.  All of the patient's questions were answered.    Procedures   Procedures    Patient seen and discussed with ED attending physician.    Aníbal Rousseau DO  Emergency Medicine      Aníbal Rousseau DO  Resident  12/11/24 [id number]     Transaminitis

## 2024-12-09 NOTE — DISCHARGE INSTRUCTIONS
You were seen today in the emergency department for possible syncopal and collapse and COPD exacerbation.  You elected not to stay in the emergency department for further workup of your syncope and collapse/COPD exacerbation.  You are given a prescription for azithromycin and prednisone please take these as prescribed.  Please follow-up with your primary care provider soon as possible.  If you experience another event of syncope or your breathing becomes worse please return to the emergency department immediately.

## 2024-12-09 NOTE — ED PROVIDER NOTES
History of Present Illness     History provided by: Patient  Limitations to History: None  External Records Reviewed with Brief Summary:  Reviewed ED note from 9/4 in which patient was seen for bloody diarrhea believed secondary to ulcerative colitis Reviewed echo from 8/4/2023 which noted LVEF 40 to 45%, pseudo normal pattern of LV diastolic filling, global hypokinesis of the LV with minor regional variations, reviewed stress test from 4/9/2021 which noted no definite evidence of ischemia or prior infarction    HPI:  Arnaldo Francisco is a 64-year-old male history of COPD, hypertension, prior CVA, colitis presenting to the ED for evaluation after presumed fall.  Patient states that he has had a productive cough that has been getting worse the last few days, notes today he felt increasingly fatigued after he got up to get coffee so he went back to lay down in bed, patient is unsure if he fell asleep or if he syncopized, notes that he woke up and he was on the floor beside his bed sitting up, he is unclear  if he hit his head, denies blood thinners.  Unsure how long he was unconscious.  Patient denies any lightheadedness, chest pain, nausea, vomiting, abdominal pain, numbness, tingling, weakness.  He notes some mild shortness of breath with exertion but denies any shortness of breath at rest, does note history of COPD and states that he has been using his inhalers at home and has had green mucus production.  He denies any pain anywhere.  Otherwise denies fever, chills.        Physical Exam   Triage vitals:  T 36.6 °C (97.9 °F)  HR 79  BP (!) 159/98  RR 18  O2 (!) 93 % None (Room air)    General: Awake, alert, in no acute distress, non-toxic appearing  Eyes: Gaze conjugate.  No scleral icterus or injection, pupils equal and reactive  HENT: Normo-cephalic, atraumatic. No stridor.   CV: Regular rate, regular rhythm. No MRG. Cap refill less than 2 seconds, radial/DP pulse 2+ bilaterally  Resp: Breathing non-labored,  coarse to auscultation bilaterally with end expiratory wheezing, diminished bilateral bases, no accessory muscle use.  GI: Soft, non-distended, non-tender. No rebound or guarding.  MSK/Extremities: No gross bony deformities. Moving all extremities.  No midline CTL TTP, no step-offs or deformities of the spine, no bruising, swelling or erythema.  Skin: Warm. Appropriate color  Neuro: Awake and Alert. Face symmetric.  Oriented x 3.  Appropriate tone. Moving all extremities equally.  Strength 5 out of 5 bilateral upper and lower extremities, sensation intact.  Ambulates with steady gait.    Medical Decision Making & ED Course   Medical Decision Makin y.o. male history of COPD, hypertension, prior CVA, colitis presenting to the ED for concern of syncopal fall.  Few days of cough with viral-like symptoms, worsened today with worsening dyspnea on exertion, will treat for COPD exacerbation with DuoNebs, steroids, azithromycin and chest x-ray to rule out pneumonia.  Patient has strong equal bilateral pulses, no pleuritic shooting chest pain and no shortness of breath lowering my suspicion for PE or aortic pathology, did consider cardiac etiology of symptoms for which we will obtain ECG, troponins and BNP.  Given unwitnessed unclear mechanism of fall will get CT head to rule out ICH, no midline spinal pain.  Patient was recommended admission for syncope evaluation and cardiac testing as well as COPD exacerbation management however he does not wish to stay.  He was agreeable to waiting for repeat troponin and CT head, will need to go home with steroids and azithromycin, shared decision making performed, patient does have capacity to choose to leave pending workup, signed out to oncoming provider pending remainder of labs and imaging.      Differential diagnoses considered include but are not limited to: Syncope secondary to orthostatic changes versus cardiogenic, electrolyte abnormalities, anemia, viral illness,  pneumonia, COPD, hypoxia     Social Determinants of Health which Significantly Impact Care: None identified     EKG Independent Interpretation: See ED course for my independent interpretation if ECG was obtained.    Independent Result Review and Interpretation: Please see MDM and ED course for my independent interpretation of the results    Chronic conditions affecting the patient's care: Please see H&P and MDM    The patient was discussed with the following consultants/services: None    Care Considerations: As document above in [facility]    ED Course:  ED Course as of 12/16/24 1357   Sun Dec 08, 2024   2137 Troponin I, High Sensitivity (CMC): 42 [KR]   2137 BNP: 71 [KR]   2200 XR chest 2 views  Chest x-ray showing emphysema changes, no focal consolidation or pleural effusion [KR]   2308 ECG 12 lead  Sinus rhythm rate 76, right axis, no acute ST segment elevation or depression, minimal criteria for LVH, , , QTc 465.  Overall morphology unchanged compared to prior from March 2022. [KR]   Mon Dec 09, 2024   0000 Attending summary:  65 y/o M w/ PMHx HTN, COPD on home nocturnal O2, CHF w/ EF 40%, Crohns, CHARLENE who is presenting for 2 weeks of cough and a fall. He reports 2 weeks of productive cough. No dyspnea, chest pain, leg edema or tenderness. States he went to bed today and then woke up on the floor. Did not feel unwell prior to going to bed. Vitals grossly unremarkable on arrival. Exam shows nontoxic appearing male in no resp distress with scattered end expiratory wheezing but moving good air, soft nontender abdomen, no LE edema or tenderness and AO x4. No midline C spine tenderness, no signs of trauma.     Productive cough likely from COPD exacerbation. No signs of bacterial PNA on CXR. Could be viral URI as well, neg COVID/flu swab here. Treated for COPD exacerbation with duonebs, steroids and azithromycin. I think mostly likely pt fell out of bed while sleeping and did not syncopize, however history not  fully clear. CT head obtained from a trauma perspective and no ICH seen on my head. [place] spine neg. Engaged in shared decision making regarding syncope admission and pt preferred outpt work-up. Given strict return precautions. Anticipate dc home if CT head read neg.  [SS]      ED Course User Index  [KR] Phoebe Montana DO  [SS] Karolina Koehler MD         Diagnoses as of 12/16/24 1357   COPD exacerbation (Multi)   Syncope and collapse     Disposition   Patient was signed out to oncoming provider at 2300 pending completion of their work-up.  Please see the next provider's transition of care note for the remainder of the patient's care.     Procedures   Procedures    Patient seen and discussed with attending physician    Karolina Koehler MD  Emergency Medicine     Phoebe Montana DO  Resident  12/08/24 1212       Karolina Koehler MD  12/16/24 6745

## 2025-01-03 ENCOUNTER — ANCILLARY PROCEDURE (OUTPATIENT)
Dept: CARDIOLOGY | Facility: CLINIC | Age: 65
End: 2025-01-03
Payer: MEDICAID

## 2025-01-03 DIAGNOSIS — I50.22 CHRONIC SYSTOLIC CONGESTIVE HEART FAILURE: ICD-10-CM

## 2025-01-03 LAB
AORTIC VALVE PEAK VELOCITY: 1.14 M/S
AV PEAK GRADIENT: 5 MMHG
AVA (PEAK VEL): 2.96 CM2
EJECTION FRACTION APICAL 4 CHAMBER: 41.3
EJECTION FRACTION: 44 %
LEFT ATRIUM VOLUME AREA LENGTH INDEX BSA: 36.1 ML/M2
LEFT VENTRICLE INTERNAL DIMENSION DIASTOLE: 6.05 CM (ref 3.5–6)
LEFT VENTRICULAR OUTFLOW TRACT DIAMETER: 2.44 CM
MITRAL VALVE E/A RATIO: 0.91
RIGHT VENTRICLE FREE WALL PEAK S': 12 CM/S
RIGHT VENTRICLE PEAK SYSTOLIC PRESSURE: 44.2 MMHG
TRICUSPID ANNULAR PLANE SYSTOLIC EXCURSION: 2.3 CM

## 2025-01-03 PROCEDURE — 93306 TTE W/DOPPLER COMPLETE: CPT | Performed by: STUDENT IN AN ORGANIZED HEALTH CARE EDUCATION/TRAINING PROGRAM

## 2025-01-03 PROCEDURE — 93306 TTE W/DOPPLER COMPLETE: CPT

## 2025-01-09 ENCOUNTER — APPOINTMENT (OUTPATIENT)
Dept: CARDIOLOGY | Facility: CLINIC | Age: 65
End: 2025-01-09
Payer: MEDICAID

## 2025-01-09 ENCOUNTER — LAB (OUTPATIENT)
Dept: LAB | Facility: LAB | Age: 65
End: 2025-01-09
Payer: MEDICAID

## 2025-01-09 VITALS
HEART RATE: 77 BPM | BODY MASS INDEX: 26.46 KG/M2 | OXYGEN SATURATION: 95 % | WEIGHT: 189 LBS | SYSTOLIC BLOOD PRESSURE: 155 MMHG | DIASTOLIC BLOOD PRESSURE: 92 MMHG | HEIGHT: 71 IN

## 2025-01-09 DIAGNOSIS — I50.22 CHRONIC SYSTOLIC CONGESTIVE HEART FAILURE: ICD-10-CM

## 2025-01-09 DIAGNOSIS — I25.118 CORONARY ARTERY DISEASE INVOLVING NATIVE HEART WITH OTHER FORM OF ANGINA PECTORIS, UNSPECIFIED VESSEL OR LESION TYPE: ICD-10-CM

## 2025-01-09 DIAGNOSIS — K52.9 COLITIS: ICD-10-CM

## 2025-01-09 LAB
ALBUMIN SERPL BCP-MCNC: 3.5 G/DL (ref 3.4–5)
ANION GAP SERPL CALC-SCNC: 12 MMOL/L (ref 10–20)
BNP SERPL-MCNC: 56 PG/ML (ref 0–99)
BUN SERPL-MCNC: 13 MG/DL (ref 6–23)
CALCIUM SERPL-MCNC: 9.1 MG/DL (ref 8.6–10.6)
CHLORIDE SERPL-SCNC: 105 MMOL/L (ref 98–107)
CHOLEST SERPL-MCNC: 122 MG/DL (ref 0–199)
CHOLESTEROL/HDL RATIO: 2.2
CO2 SERPL-SCNC: 30 MMOL/L (ref 21–32)
CREAT SERPL-MCNC: 0.89 MG/DL (ref 0.5–1.3)
EGFRCR SERPLBLD CKD-EPI 2021: >90 ML/MIN/1.73M*2
ERYTHROCYTE [DISTWIDTH] IN BLOOD BY AUTOMATED COUNT: 14.2 % (ref 11.5–14.5)
GLUCOSE SERPL-MCNC: 102 MG/DL (ref 74–99)
HCT VFR BLD AUTO: 43.9 % (ref 41–52)
HDLC SERPL-MCNC: 56.6 MG/DL
HGB BLD-MCNC: 12.9 G/DL (ref 13.5–17.5)
LDLC SERPL CALC-MCNC: 53 MG/DL
MCH RBC QN AUTO: 23.8 PG (ref 26–34)
MCHC RBC AUTO-ENTMCNC: 29.4 G/DL (ref 32–36)
MCV RBC AUTO: 81 FL (ref 80–100)
NON HDL CHOLESTEROL: 65 MG/DL (ref 0–149)
NRBC BLD-RTO: 0 /100 WBCS (ref 0–0)
PHOSPHATE SERPL-MCNC: 4.4 MG/DL (ref 2.5–4.9)
PLATELET # BLD AUTO: 317 X10*3/UL (ref 150–450)
POTASSIUM SERPL-SCNC: 4.7 MMOL/L (ref 3.5–5.3)
RBC # BLD AUTO: 5.41 X10*6/UL (ref 4.5–5.9)
SODIUM SERPL-SCNC: 142 MMOL/L (ref 136–145)
TRIGL SERPL-MCNC: 63 MG/DL (ref 0–149)
VLDL: 13 MG/DL (ref 0–40)
WBC # BLD AUTO: 6.3 X10*3/UL (ref 4.4–11.3)

## 2025-01-09 PROCEDURE — 80061 LIPID PANEL: CPT

## 2025-01-09 PROCEDURE — 36415 COLL VENOUS BLD VENIPUNCTURE: CPT

## 2025-01-09 PROCEDURE — 85027 COMPLETE CBC AUTOMATED: CPT

## 2025-01-09 PROCEDURE — 3077F SYST BP >= 140 MM HG: CPT | Performed by: STUDENT IN AN ORGANIZED HEALTH CARE EDUCATION/TRAINING PROGRAM

## 2025-01-09 PROCEDURE — 99215 OFFICE O/P EST HI 40 MIN: CPT | Performed by: STUDENT IN AN ORGANIZED HEALTH CARE EDUCATION/TRAINING PROGRAM

## 2025-01-09 PROCEDURE — 1036F TOBACCO NON-USER: CPT | Performed by: STUDENT IN AN ORGANIZED HEALTH CARE EDUCATION/TRAINING PROGRAM

## 2025-01-09 PROCEDURE — 3080F DIAST BP >= 90 MM HG: CPT | Performed by: STUDENT IN AN ORGANIZED HEALTH CARE EDUCATION/TRAINING PROGRAM

## 2025-01-09 PROCEDURE — 3008F BODY MASS INDEX DOCD: CPT | Performed by: STUDENT IN AN ORGANIZED HEALTH CARE EDUCATION/TRAINING PROGRAM

## 2025-01-09 PROCEDURE — 80069 RENAL FUNCTION PANEL: CPT

## 2025-01-09 PROCEDURE — 83880 ASSAY OF NATRIURETIC PEPTIDE: CPT

## 2025-01-09 PROCEDURE — 80230 DRUG ASSAY INFLIXIMAB: CPT

## 2025-01-09 RX ORDER — FUROSEMIDE 20 MG/1
20 TABLET ORAL DAILY PRN
Qty: 90 TABLET | Refills: 2 | Status: SHIPPED | OUTPATIENT
Start: 2025-01-09 | End: 2026-01-09

## 2025-01-09 RX ORDER — METOPROLOL SUCCINATE 200 MG/1
200 TABLET, EXTENDED RELEASE ORAL DAILY
Qty: 90 TABLET | Refills: 3 | Status: SHIPPED | OUTPATIENT
Start: 2025-01-09

## 2025-01-09 RX ORDER — SPIRONOLACTONE 25 MG/1
25 TABLET ORAL DAILY
Qty: 90 TABLET | Refills: 3 | Status: SHIPPED | OUTPATIENT
Start: 2025-01-09

## 2025-01-09 RX ORDER — DAPAGLIFLOZIN 10 MG/1
10 TABLET, FILM COATED ORAL DAILY
Qty: 90 TABLET | Refills: 3 | Status: SHIPPED | OUTPATIENT
Start: 2025-01-09

## 2025-01-09 RX ORDER — SACUBITRIL AND VALSARTAN 97; 103 MG/1; MG/1
1 TABLET, FILM COATED ORAL 2 TIMES DAILY
Qty: 180 TABLET | Refills: 3 | Status: SHIPPED | OUTPATIENT
Start: 2025-01-09

## 2025-01-09 RX ORDER — ASPIRIN 81 MG/1
81 TABLET ORAL DAILY
Qty: 30 TABLET | Refills: 1 | Status: SHIPPED | OUTPATIENT
Start: 2025-01-09

## 2025-01-09 RX ORDER — PRAVASTATIN SODIUM 40 MG/1
40 TABLET ORAL NIGHTLY
Qty: 90 TABLET | Refills: 3 | Status: SHIPPED | OUTPATIENT
Start: 2025-01-09

## 2025-01-09 ASSESSMENT — PAIN SCALES - GENERAL: PAINLEVEL_OUTOF10: 0-NO PAIN

## 2025-01-09 NOTE — PATIENT INSTRUCTIONS
Thank you for coming in today. If you have any questions or concerns, you may call the Heart Failure Office at [phone] option [phone], or [phone].  You may also contact our heart failure nursing team via email on [email or web address].    For quicker results set-up your  [employer] account to receive results and other correspondence directly to your phone.    Please bring all your pills/medications to your Cardiology appointments.    **  -We will renew your heart failure medications today and send them all to [facility] pharmacy for pill pack to be sent to your house    - Collect Spironolactone, and farxiga from your pharmacy today    - Please have the following tests done:  Blood tests this week ( CBC, BNP, RFP, lipids)  2.   Blood tests just before your next visit (RFP, BNP, CBC)    3.  Nuclear pharmacological stress test, CALL [phone] to schedule this test    - I have updated Dr Zelaya on your rectal bleeding, please contact his office ASAP    - Please make an appointment to be seen in 3 months

## 2025-01-09 NOTE — PROGRESS NOTES
Past cardiologist: Dr. PATRICIA Nguyen  Accompanied by : wife on phone    Chief Complaint  Ambulatory heart failure care      History of Present Illness  64 y.o. medically disabled /watters who presents for advanced heart failure care. 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  Mr. Francisco has a history of HFmrEF and echocardiogram 1/27/2021 shows LVEF 45-50% LVIDD 5.5 cm with preserved right ventricular systolic function. His last coronary interrogation was a left heart catheterization 6/2015 which showed normal coronaries.Prev needed LiFeVest and had some LV recovery. On nuclear pharmacological stress test 4/2021 there was no inducible ischemia.  He had been lost to Cardiology team since 6/2021, HF GDMT was reinitiated and is now optimized.  He is fully adherent with all heart failure medications.  TTE 1/2025 demonstrated LVEF 40 to 45%, LVIDD 6.05 cm  He has been mostly adherent with heart failure medication therapy, but has not been getting MRA and SGLT2i from the pharmacy even though he has active refills.  Neither he nor his wife are sure why this is the case    Mr Francisco is is most troubled by bloody stools for the past 3 months. Also defecating 10-12 times a day.  He finds his GI symptoms very distressing    He denies chest pain, dyspnea at rest, exertional dyspnea, PND, leg swelling, syncope, presyncope, palpitations.  He continues to have a chronic two-pillow orthopnea which is unchanged.    He gets SOB on exertion when its cold outside, on non cold days he has normal exercise  tolerance     He has not been to the emergency room or been  "hospitalized for heart failure indications.    Surgical Hx:  - Nil    Social Hx:  - Smoking-ex-smoker, quit  prev 1 PPD for 15-20 years  - ETOH- prev heavy drinker, quit heavy drinking.  He tells me he is down to 2-3 beers a day.  No longer drinking this.  - Illicit drugs-previous heroin use, quit   - Lives with wife   - has children - no heart disease    Family Hx:  Specifically, there is no family history of CAD,PPM, LVAD, OHT, arrhythmias, CVA, or sudden cardiac death.    Mother-  with \"heart problems\" in hr \"60s. \"enlarged heart\"  Brother-  with \"heart trouble\" and had ICD  Brother- \"heart trouble\"    Medication reconciliation completed, see below.     Investigations:    The electronic medical record has been reviewed by me for salient history. All cardiovascular imaging and testing available in the electronic medical record, and [employer] has been reviewed.     Medication Documentation Review Audit       Reviewed by Rachelle Verma RN (Registered Nurse) on 25 at 0911      Medication Order Taking? Sig Documenting Provider Last Dose Status   albuterol (Ventolin HFA) 90 mcg/actuation inhaler [id number] Yes Inhale. Historical Provider, MD  Active   albuterol 0.63 mg/3 mL nebulizer solution [id number] Yes Take 3 mL (0.63 mg) by nebulization 4 times a day as needed. Historical Provider, MD  Active   aspirin 81 mg EC tablet [id number] Yes TAKE 1 TABLET BY MOUTH EVERY DAY Idris Leggett MD MPH  Active   azaTHIOprine (Imuran) 50 mg tablet [id number] Yes Take 1 tablet (50 mg) by mouth once daily for 14 days, THEN 2 tablets (100 mg) once daily. Lexie Zelaya MD  Active   dapagliflozin propanediol (Farxiga) 10 mg [id number]  Take 1 tablet (10 mg) by mouth once daily.   Patient not taking: Reported on 2025    Ghazal Simms MD PhD  Active   ferrous sulfate 325 (65 Fe) MG tablet [id number]  Take 1 tablet (325 mg) by mouth once daily.   Patient not taking: Reported on 2025    Historical Provider, MD  " Active   fluticasone propion-salmeteroL (Advair Diskus) 500-50 mcg/dose diskus inhaler [id number] Yes Inhale 1 puff 2 times a day. Rinse mouth with water after use to reduce aftertaste and incidence of candidiasis. Do not swallow. CLAIR Colby-CNP  Active   fluticasone propion-salmeteroL (Advair Diskus) 500-50 mcg/dose diskus inhaler [id number] Yes Inhale 1 puff 2 times a day. CLAIR Colby-CNP  Active   furosemide (Lasix) 20 mg tablet [id number] Yes Take 1 tablet (20 mg) by mouth once daily as needed (for weight gain of more than 3 lbs a day). Ghazal Simms MD PhD  Active   gabapentin (Neurontin) 600 mg tablet [id number] Yes Take 1 tablet (600 mg) by mouth. Take 600 mg in the morning, 600 mg midday, and 1200 mg at bedtime Historical Provider, MD  Active   inFLIXimab-dyyb (Inflectra) 100 mg injection [id number] Yes Infuse 400 mg (5 mg/kg) into a venous catheter see administration instructions.  Infuse at week 0,2,and 6 weeks then every 8 weeks. Lexie Zelaya MD  Active   ipratropium/albuterol sulfate (COMBIVENT RESPIMAT INHL) [id number] Yes Inhale 2 puffs. Historical Provider, MD  Active   levocetirizine (Xyzal) 5 mg tablet [id number]  Take by mouth once daily.   Patient not taking: Reported on 2025    Historical Provider, MD  Active   loperamide (Imodium) 2 mg capsule [id number]  Take 1 capsule (2 mg) by mouth 3 times a day as needed for diarrhea. Lexie Zelaya MD   25 [id number]   mesalamine (Lialda) 1.2 gram EC tablet [id number] Yes Take 1 tablet (1.2 g) by mouth 2 times a day. Do not crush, chew, or split. Lexie Zelaya MD  Active   methadone HCl (METHADONE ORAL) [id number] Yes Take 60 mL by mouth once daily. Pt states last dose was saturday Historical Provider, MD  Active   metoprolol succinate XL (Toprol-XL) 200 mg 24 hr tablet [id number] Yes Take 1 tablet (200 mg) by mouth once daily. Ghazal Simms MD PhD  Active   nitroglycerin (Nitrostat) 0.4 mg SL tablet [id number] Yes  "Place under the tongue. Dissolve 1 tab under the tongue as needed for chest pain every 5 minutes up to 3 times if no relief call 911 Historical Provider, MD  Active   omeprazole (PriLOSEC) 20 mg DR capsule [id number]  TAKE 1 CAPSULE(20 MG) BY MOUTH TWICE DAILY BEFORE MEALS. DO NOT CRUSH, CHEW, OR SPLIT   Patient not taking: Reported on 2025    Abelino Gaspar MD MPH  Active   pravastatin (Pravachol) 40 mg tablet [id number] Yes Take 1 tablet (40 mg) by mouth once daily at bedtime. Ghazal Simms MD PhD  Active   sacubitriL-valsartan (Entresto)  mg tablet [id number] Yes Take 1 tablet by mouth 2 times a day. Ghazal Simms MD PhD  Active   simethicone (Mylicon) 80 mg chewable tablet [id number]  Chew 1 tablet (80 mg) every 6 hours if needed for flatulence. Lexie Zelaya MD   25 0503   spironolactone (Aldactone) 25 mg tablet [id number]  Take 1 tablet (25 mg) by mouth once daily.   Patient not taking: Reported on 2025    Ghazal Simms MD PhD  Active   tamsulosin (Flomax) 0.4 mg 24 hr capsule [id number] Yes TAKE 1 CAPSULE BY MOUTH ONCE DAILY Aldair Gallardo MD  Active   tiotropium (Spiriva Respimat) 2.5 mcg/actuation inhaler [id number] Yes Inhale 2 puffs once daily. Janette Bermeo, APRN-CNP  Active                         Allergies   Allergen Reactions    Shellfish Containing Products Anaphylaxis    Ibuprofen Hives        Visit Vitals  Ht 1.803 m (5' 11\")   Wt 85.7 kg (189 lb)   BMI 26.36 kg/m²   Smoking Status Former   BSA 2.07 m²         IMPRESSION:    64 y.o. medically disabled /watters who presents for advanced heart failure care. He has a past medical history significant for hypertension, COPD/emphysema which has been characterized as severe and he was previously on supplemental nocturnal home oxygen, CHARLENE - has not needed CPAP, ex-smoker - quit , ex-heroin use, Crohn's disease ( has re-established with gastroenterology), COVID infection hospitalization 2020 " he was treated with dexamethasone and low molecular weight heparin.  Mr. Francisco has a history of HFmrEF and echocardiogram 1/27/2021 shows LVEF 45-50% LVIDD 5.5 cm with preserved right ventricular systolic function. His last coronary interrogation was a left heart catheterization 6/2015 which showed normal coronaries.Prev needed LiFeVest and had some LV recovery. On nuclear pharmacological stress test 4/2021 there was no inducible ischemia.  He had been lost to Cardiology team since 6/2021 and HF GDMT was reinitiated and is now optimized.    TTE 1/2025 demonstrated LVEF 40 to 45%, LVIDD 6.05 cm  He has been mostly adherent with heart failure medication therapy, but has not been getting MRA and SGLT2i from the pharmacy even though he has active refills.      NYHA Functional Class: 1 (improved)  ACC/AHA Stage C heart failure  Volume status: Euvolaemic  Perfusion status: Warm to touch      PLAN:  -HF GDMT will be continued, details below.  We will transition to a pill planner system to make sure he has his medications regularly, on schedule  -Genetic testing( 2 first degree relative with CM- mother and brother) requested at last visit, appointment pending  -No current need for device therapy, LVEF> 35%  -Heart failure lifestyle modifications discussed and Qs answered.  Encouraged to do daily weights  -Nuclear pharmacological stress test, mild decrement in LVEF.  Also with exertional dyspnea and cold weather.  I suspect this may be related to anemia given his ongoing lower GI bleeding, but will evaluate for cardiac cause as well.  -Labs today, and before next visit    -Medication optimisation:  BB: Continue metoprolol succinate  RAASi: Continue sacubitril/valsartan 97/103 mg twice daily   MRA: Continue spironolactone 25 mg once daily  SGLTi: Continue Dapaglilozin 10 mg qd  ISDN/Hydral: Continue to hold  Furosemide as needed with weight gain  He has been asked to collect Farxiga, and spironolactone which he was missing  from his pharmacy until he can get the pill packs provided to him.    #History of colitis with ongoing bloody frequent stools  I have touched base with his gastroenterology attending to see whether further intervention is warranted.  I have asked Mr. Francisco and his wife to touch base with their GI team to follow-up on this      This note was transcribed using the Dragon Dictation system. There may be grammatical, punctuation, or verbiage errors that can occur with voice recognition programs.

## 2025-01-10 DIAGNOSIS — J43.9 PULMONARY EMPHYSEMA, UNSPECIFIED EMPHYSEMA TYPE (MULTI): ICD-10-CM

## 2025-01-10 RX ORDER — TIOTROPIUM BROMIDE INHALATION SPRAY 3.12 UG/1
2 SPRAY, METERED RESPIRATORY (INHALATION) DAILY
Qty: 4 G | Refills: 3 | Status: SHIPPED | OUTPATIENT
Start: 2025-01-10

## 2025-01-12 LAB
INFLIXIMAB AB SERPL IA-MCNC: <20 NG/ML
INFLIXIMAB SERPL IA-MCNC: <0.5 UG/ML

## 2025-01-13 ENCOUNTER — APPOINTMENT (OUTPATIENT)
Dept: INFUSION THERAPY | Facility: CLINIC | Age: 65
End: 2025-01-13
Payer: MEDICAID

## 2025-01-13 VITALS
WEIGHT: 189.26 LBS | DIASTOLIC BLOOD PRESSURE: 84 MMHG | BODY MASS INDEX: 26.4 KG/M2 | OXYGEN SATURATION: 98 % | HEART RATE: 59 BPM | TEMPERATURE: 97.2 F | RESPIRATION RATE: 18 BRPM | SYSTOLIC BLOOD PRESSURE: 127 MMHG

## 2025-01-13 DIAGNOSIS — K50.90 CROHN'S DISEASE WITHOUT COMPLICATION, UNSPECIFIED GASTROINTESTINAL TRACT LOCATION: ICD-10-CM

## 2025-01-13 PROCEDURE — 96413 CHEMO IV INFUSION 1 HR: CPT | Performed by: NURSE PRACTITIONER

## 2025-01-13 PROCEDURE — 96415 CHEMO IV INFUSION ADDL HR: CPT | Performed by: NURSE PRACTITIONER

## 2025-01-13 RX ORDER — ACETAMINOPHEN 325 MG/1
650 TABLET ORAL ONCE
OUTPATIENT
Start: 2025-03-10 | End: 2025-03-10

## 2025-01-13 RX ORDER — ACETAMINOPHEN 325 MG/1
650 TABLET ORAL ONCE
Status: COMPLETED | OUTPATIENT
Start: 2025-01-13 | End: 2025-01-13

## 2025-01-13 RX ORDER — ALBUTEROL SULFATE 0.83 MG/ML
3 SOLUTION RESPIRATORY (INHALATION) AS NEEDED
OUTPATIENT
Start: 2025-03-10

## 2025-01-13 RX ORDER — EPINEPHRINE 0.3 MG/.3ML
0.3 INJECTION SUBCUTANEOUS EVERY 5 MIN PRN
OUTPATIENT
Start: 2025-03-10

## 2025-01-13 RX ORDER — FAMOTIDINE 10 MG/ML
20 INJECTION INTRAVENOUS ONCE AS NEEDED
OUTPATIENT
Start: 2025-03-10

## 2025-01-13 RX ORDER — DIPHENHYDRAMINE HYDROCHLORIDE 50 MG/ML
50 INJECTION INTRAMUSCULAR; INTRAVENOUS AS NEEDED
OUTPATIENT
Start: 2025-03-10

## 2025-01-13 RX ADMIN — ACETAMINOPHEN 650 MG: 325 TABLET ORAL at 08:04

## 2025-01-13 ASSESSMENT — ENCOUNTER SYMPTOMS
SHORTNESS OF BREATH: 0
EYE PROBLEMS: 0
ABDOMINAL PAIN: 1
NUMBNESS: 0
FEVER: 0
UNEXPECTED WEIGHT CHANGE: 0
MYALGIAS: 0
CONSTIPATION: 1
LIGHT-HEADEDNESS: 0
BLOOD IN STOOL: 1
DIARRHEA: 1
FREQUENCY: 0
WOUND: 0
SORE THROAT: 0
ARTHRALGIAS: 0
VOMITING: 0
APPETITE CHANGE: 0
COUGH: 0
TROUBLE SWALLOWING: 0
HEMATURIA: 0
EXTREMITY WEAKNESS: 0
DYSURIA: 0
WHEEZING: 0
DEPRESSION: 0
VOICE CHANGE: 0
DIZZINESS: 0
CHILLS: 0
PALPITATIONS: 0
NAUSEA: 0
LEG SWELLING: 0
HEADACHES: 0
NERVOUS/ANXIOUS: 0
FATIGUE: 0

## 2025-01-13 NOTE — PATIENT INSTRUCTIONS
Today :We administered acetaminophen and inFLIXimab-dyyb (Inflectra) 400 mg in sodium chloride 0.9% 250 mL IV.     For:   1. Crohn's disease without complication, unspecified gastrointestinal tract location         Your next appointment is due in:  56 DAYS        Please read the  Medication Guide that was given to you and reviewed during todays visit.     (Tell all doctors including dentists that you are taking this medication)     Go to the emergency room or call 911 if:  -You have signs of allergic reaction:   -Rash, hives, itching.   -Swollen, blistered, peeling skin.   -Swelling of face, lips, mouth, tongue or throat.   -Tightness of chest, trouble breathing, swallowing or talking     Call your doctor:  - If IV / injection site gets red, warm, swollen, itchy or leaks fluid or pus.     (Leave dressing on your IV site for at least 2 hours and keep area clean and dry  - If you get sick or have symptoms of infection or are not feeling well for any reason.    (Wash your hands often, stay away from people who are sick)  - If you have side effects from your medication that do not go away or are bothersome.     (Refer to the teaching your nurse gave you for side effects to call your doctor about)    - Common side effects may include:  stuffy nose, headache, feeling tired, muscle aches, upset stomach  - Before receiving any vaccines     - Call the [facility] at   If:  - You get sick, are on antibiotics, have had a recent vaccine, have surgery or dental work and your doctor wants your visit rescheduled.  - You need to cancel and reschedule your visit for any reason. Call at least 2 days before your visit if you need to cancel.   - Your insurance changes before your next visit.    (We will need to get approval from your new insurance. This can take up to two weeks.)     The [facility] is opened Monday thru Friday. We are closed on weekends and holidays.   Voice mail will take your call if  the center is closed. If you leave a message please allow 24 hours for a call back during weekdays. If you leave a message on a weekend/holiday, we will call you back the next business day.    A pharmacist is available Monday - Friday from 8:30AM to 3:30PM to help answer any questions you may have about your prescriptions(s). Please call pharmacy at:    [facility]: [phone]  [facility]: [phone]  [facility]: [phone]

## 2025-01-13 NOTE — PROGRESS NOTES
[facility]   Infusion Clinic Note   Date: 2025   Name: Arnaldo Francisco  : 1960   MRN: [id number]          Reason for Visit: OP Infusion and Follow-up (PT HERE FOR INFLECTRA 400 MG INFUSION/NEXT APPT: 56 DAYS)         Today: We administered acetaminophen and inFLIXimab-dyyb (Inflectra) 400 mg in sodium chloride 0.9% 250 mL IV.       Visit Type: INFUSION       Ordered By: DR THOMPSON       Accompanied by:Self       Diagnosis: Crohn's disease without complication, unspecified gastrointestinal tract location        Allergies:   Allergies as of 2025 - Reviewed 2025   Allergen Reaction Noted    Shellfish containing products Anaphylaxis 02/10/2023    Ibuprofen Hives 02/10/2023          Current Medications has a current medication list which includes the following prescription(s): albuterol, albuterol, aspirin, azathioprine, dapagliflozin propanediol, ferrous sulfate (325 mg ferrous sulfate), fluticasone propion-salmeterol, fluticasone propion-salmeterol, furosemide, gabapentin, infliximab-dyyb, ipratropium/albuterol sulfate, levocetirizine, mesalamine, methadone hcl, metoprolol succinate xl, nitroglycerin, omeprazole, pravastatin, sacubitril-valsartan, spironolactone, tamsulosin, and spiriva respimat.       Vitals:   Vitals:    25 0754 25 0843 25 0907 25 1001   BP: 138/86 126/83 121/79 (!) 134/99  Comment: NURSE NOTIFIED   Pulse: 77 60 65 76   Resp: 18  16 18   Temp: 36.7 °C (98.1 °F) 36.7 °C (98 °F) 36.5 °C (97.7 °F) 36.5 °C (97.7 °F)   SpO2: 94% 92% 98% 94%   Weight: 85.9 kg (189 lb 4.2 oz)       25 1040   BP: 127/84   Pulse: 59  Comment: NURSE NOTIFIED   Resp: 18   Temp: 36.2 °C (97.2 °F)   SpO2: 98%   Weight:              Infusion Pre-procedure Checklist:   - Allergies reviewed: yes   - Medications reviewed: yes       - Previous reaction to current treatment: no      Assess patient for the concerns below. Document provider notification  as appropriate.  - Active or recent infection with/without current antibiotic use: no  - Recent or planned invasive dental work: no  - Recent or planned surgeries: no  - Recently received or plans to receive vaccinations: no  - Has treatment related toxicities: no  - Is pregnant:  n/a      Pain: 0   - Is the pain different from normal: n/a   - Is your Doctor aware:  n/a       Labs: N/A          Fall Risk Screening: Muir Fall Risk  History of Falling, Immediate or Within 3 Months: Yes (FELL ON FRIDAY 1/10 ON ESCALATOR STEPS)  Secondary Diagnosis: No  Ambulatory Aid: Walks without aid/bedrest/nurse assist  Intravenous Therapy/Heparin Lock: Yes  Gait/Transferring: Normal/bedrest/immobile  Mental Status: Oriented to own ability  Muir Fall Risk Score: 45       Review Of Systems:  Review of Systems   Constitutional:  Negative for appetite change, chills, fatigue, fever and unexpected weight change.   HENT:   Negative for hearing loss, mouth sores, sore throat, tinnitus, trouble swallowing and voice change.    Eyes:  Negative for eye problems.   Respiratory:  Negative for cough, shortness of breath and wheezing.    Cardiovascular:  Negative for chest pain, leg swelling and palpitations.   Gastrointestinal:  Positive for abdominal pain, blood in stool, constipation and diarrhea. Negative for nausea and vomiting.        PT WITH A DX OF IBD REPORTS #  15 LOOSE, WATERY BM'S PER DAY. reports NOTING BLOOD/MUCUS TO STOOLS.  reports C/O OF ABDOMINAL PAIN AND/OR BOUTS OF NOCTURNAL STOOLING.     Genitourinary:  Negative for dysuria, frequency and hematuria.    Musculoskeletal:  Negative for arthralgias and myalgias.   Skin:  Negative for itching, rash and wound.   Neurological:  Negative for dizziness, extremity weakness, headaches, light-headedness and numbness.   Psychiatric/Behavioral:  Negative for depression. The patient is not nervous/anxious.          ROS completed? Yes      Infusion Readiness:  - Assessment Concerns  "Related to Infusion: No  - Provider notified: n/a      Document Below Only If Indicated:   New Patient Education:    N/A (returning patient for continuation of therapy. Ongoing education provided as needed.)        Treatment Conditions & Drug Specific Questions:    InFLIXimab  (AVSOLA, INFLECTRA, REMICADE, RENFLEXIS)    (Unless otherwise specified on patient specific therapy plan):     TREATMENT CONDITIONS:  Unless otherwise specified on patient specific therapy plan HOLD and notify Provider prior to proceeding with today's infusion if patient with:  o Positive T-Spot  o Reactive Hep B sAg and/or Hep B Core Antibody    Lab Results   Component Value Date    TBSIN Negative 09/10/2024      Lab Results   Component Value Date    HEPBSAG Nonreactive 09/10/2024      No results found for: \"NONUHFIRE\", \"NONUHSWGH\", \"NONUHFISH\", \"EXTHEPBSAG\"  Lab Results   Component Value Date    HEPBCAB Nonreactive 09/10/2024     Lab Results   Component Value Date    HEPBCIGM Nonreactive 09/10/2024    HEPBCAB Nonreactive 09/10/2024    HEPCAB Nonreactive 09/10/2024        Labs reviewed and patient meets treatment conditions? Yes    DRUG SPECIFIC QUESTIONS:    Any new or worsening signs / symptoms of heart failure which may include things like worsening shortness of breath, swelling, fatigue? No   (If yes notify provider before proceeding with today's infusion. New onset or worsening HF have been reported with infliximab)    REMINDER:   WEIGHT BASED DRUG     Recommended Vitals/Observation:  Vitals:     Induction: Obtain vital signs every 30 minutes; at end of observation period and as needed.     Maintenance: Obtain vital signs at start and end of infusions  Observation:     Induction: Patient is monitored for 30 minutes post-infusion      Maintenance: No observation.        Weight Based Drug Calculations:    WEIGHT BASED DRUGS: Infliximab (REMICADE, INFLECTRA, RENFLEXIS)   Patient's dosing weight (kg): 88KG     10% weight variance for " prescribed treatment: 79.2 kg to 96.8 kg     Patient's weight today:   Vitals:    01/13/25 0754   Weight: 85.9 kg (189 lb 4.2 oz)         weight range for prescribed dose:     Patient weight today falls outside of 10% variance or  weight range: No     Home Care pharmacist informed of weight variance: Not applicable    Doses that are weight based have an acceptable variance rule within 10% of the prescribed   order and/or within  weight range. If patient weight on day of infusion falls   outside of the 10% variance, or weight range, infusion is administered and   pharmacy contacted regarding future dosing adjustments, per policy.         Initiated By: Cyn Darling RN

## 2025-01-14 ENCOUNTER — TELEPHONE (OUTPATIENT)
Dept: GASTROENTEROLOGY | Facility: HOSPITAL | Age: 65
End: 2025-01-14
Payer: MEDICAID

## 2025-01-14 DIAGNOSIS — K50.80 CROHN'S DISEASE OF BOTH SMALL AND LARGE INTESTINE WITHOUT COMPLICATION (MULTI): ICD-10-CM

## 2025-01-14 DIAGNOSIS — K52.9 COLITIS: Primary | ICD-10-CM

## 2025-01-14 RX ORDER — LOPERAMIDE HYDROCHLORIDE 2 MG/1
2 CAPSULE ORAL 3 TIMES DAILY PRN
Qty: 30 CAPSULE | Refills: 1 | Status: SHIPPED | OUTPATIENT
Start: 2025-01-14 | End: 2025-02-13

## 2025-01-14 NOTE — TELEPHONE ENCOUNTER
I called pt and explained that IFX week 14 level was very low in subtherapeutic range (No Abs)- we discussed several options including escalating IFX dose vs switsh out of class. Based on our discussion, we will proceed with doubling IFX dose from 5mg/kg to 10mg/kg. If he does not do well after next dose, we will decrease interval to q4-6 weeks.    Per pt, BRBPR has decreased but diarrhea is not consistently better- he has good days where he has 3-5 loose to semi solid Bms a day but also bad days when he has 7-10BMs a day.    Additionally, he has not started AZA as yet- I reminded him to initiate AZA 50mg daily for 2 weeks and if he does not have any n/v, he will increase AZA to 100mg a day.    I will also order imodium 2mg tid PRN for diarrhea.

## 2025-01-15 ENCOUNTER — APPOINTMENT (OUTPATIENT)
Dept: RADIOLOGY | Facility: HOSPITAL | Age: 65
End: 2025-01-15
Payer: MEDICAID

## 2025-01-15 ENCOUNTER — HOSPITAL ENCOUNTER (EMERGENCY)
Facility: HOSPITAL | Age: 65
Discharge: HOME | End: 2025-01-15
Attending: EMERGENCY MEDICINE
Payer: MEDICAID

## 2025-01-15 VITALS
TEMPERATURE: 97.9 F | OXYGEN SATURATION: 95 % | DIASTOLIC BLOOD PRESSURE: 86 MMHG | HEART RATE: 68 BPM | SYSTOLIC BLOOD PRESSURE: 131 MMHG | RESPIRATION RATE: 18 BRPM

## 2025-01-15 DIAGNOSIS — M79.18 MUSCULOSKELETAL PAIN: Primary | ICD-10-CM

## 2025-01-15 PROCEDURE — 71101 X-RAY EXAM UNILAT RIBS/CHEST: CPT | Mod: LT

## 2025-01-15 PROCEDURE — 2500000005 HC RX 250 GENERAL PHARMACY W/O HCPCS: Mod: SE

## 2025-01-15 PROCEDURE — 71100 X-RAY EXAM RIBS UNI 2 VIEWS: CPT | Performed by: STUDENT IN AN ORGANIZED HEALTH CARE EDUCATION/TRAINING PROGRAM

## 2025-01-15 PROCEDURE — 99284 EMERGENCY DEPT VISIT MOD MDM: CPT | Performed by: EMERGENCY MEDICINE

## 2025-01-15 PROCEDURE — 71045 X-RAY EXAM CHEST 1 VIEW: CPT

## 2025-01-15 PROCEDURE — 2500000001 HC RX 250 WO HCPCS SELF ADMINISTERED DRUGS (ALT 637 FOR MEDICARE OP): Mod: SE

## 2025-01-15 PROCEDURE — 71046 X-RAY EXAM CHEST 2 VIEWS: CPT | Performed by: STUDENT IN AN ORGANIZED HEALTH CARE EDUCATION/TRAINING PROGRAM

## 2025-01-15 RX ORDER — LIDOCAINE 560 MG/1
1 PATCH PERCUTANEOUS; TOPICAL; TRANSDERMAL ONCE
Status: DISCONTINUED | OUTPATIENT
Start: 2025-01-15 | End: 2025-01-16 | Stop reason: HOSPADM

## 2025-01-15 RX ORDER — CYCLOBENZAPRINE HCL 10 MG
10 TABLET ORAL 2 TIMES DAILY PRN
Qty: 10 TABLET | Refills: 0 | Status: SHIPPED | OUTPATIENT
Start: 2025-01-15 | End: 2025-01-15 | Stop reason: WASHOUT

## 2025-01-15 RX ORDER — CYCLOBENZAPRINE HCL 5 MG
5 TABLET ORAL 3 TIMES DAILY
Qty: 9 TABLET | Refills: 0 | Status: SHIPPED | OUTPATIENT
Start: 2025-01-15 | End: 2025-01-18

## 2025-01-15 RX ORDER — CYCLOBENZAPRINE HCL 10 MG
5 TABLET ORAL ONCE
Status: COMPLETED | OUTPATIENT
Start: 2025-01-15 | End: 2025-01-15

## 2025-01-15 RX ORDER — ACETAMINOPHEN 325 MG/1
975 TABLET ORAL ONCE
Status: COMPLETED | OUTPATIENT
Start: 2025-01-15 | End: 2025-01-15

## 2025-01-15 RX ORDER — ACETAMINOPHEN 500 MG
1000 TABLET ORAL EVERY 6 HOURS PRN
Qty: 30 TABLET | Refills: 0 | Status: SHIPPED | OUTPATIENT
Start: 2025-01-15 | End: 2025-01-25

## 2025-01-15 RX ADMIN — CYCLOBENZAPRINE 5 MG: 10 TABLET, FILM COATED ORAL at 20:25

## 2025-01-15 RX ADMIN — ACETAMINOPHEN 975 MG: 325 TABLET ORAL at 20:25

## 2025-01-15 RX ADMIN — LIDOCAINE 4% 1 PATCH: 40 PATCH TOPICAL at 20:25

## 2025-01-15 ASSESSMENT — PAIN DESCRIPTION - LOCATION
LOCATION: BACK
LOCATION: BACK

## 2025-01-15 ASSESSMENT — PAIN SCALES - GENERAL
PAINLEVEL_OUTOF10: 7
PAINLEVEL_OUTOF10: 7
PAINLEVEL_OUTOF10: 8

## 2025-01-15 ASSESSMENT — COLUMBIA-SUICIDE SEVERITY RATING SCALE - C-SSRS
2. HAVE YOU ACTUALLY HAD ANY THOUGHTS OF KILLING YOURSELF?: NO
1. IN THE PAST MONTH, HAVE YOU WISHED YOU WERE DEAD OR WISHED YOU COULD GO TO SLEEP AND NOT WAKE UP?: NO
6. HAVE YOU EVER DONE ANYTHING, STARTED TO DO ANYTHING, OR PREPARED TO DO ANYTHING TO END YOUR LIFE?: NO

## 2025-01-15 ASSESSMENT — PAIN DESCRIPTION - PAIN TYPE
TYPE: ACUTE PAIN
TYPE: ACUTE PAIN

## 2025-01-15 ASSESSMENT — PAIN - FUNCTIONAL ASSESSMENT
PAIN_FUNCTIONAL_ASSESSMENT: 0-10
PAIN_FUNCTIONAL_ASSESSMENT: 0-10

## 2025-01-15 ASSESSMENT — PAIN DESCRIPTION - ORIENTATION: ORIENTATION: RIGHT

## 2025-01-16 NOTE — ED PROVIDER NOTES
Emergency Department Provider Note        History of Present Illness     History provided by: Patient  Limitations to History: None  External Records Reviewed with Brief Summary: None    HPI:  Arnaldo Francisco is a 64 y.o. male with PMH of asthma, COPD. HTN here for back pain. Patient reports pain after tripping on escalator. He reports the escalator malfunctioned and patient tripped down t steps onto his back. Patient reports pain to his back bilateral sides. He reports he tried tylenol x2 with mild improvement. Patient denies head trauma, LOC, or blood thinners. He denies headache or vision changes. Patient denies fevers , chest pain, abdominal pain, sensory changes, night sweats, weight loss or immunocompromised state, history of cancer.  He denies urinary/stool incontinence or retention or saddle anesthesia, IVDU. He reports casual drinker and denies smoking.       Physical Exam   Triage vitals:  T 36.6 °C (97.9 °F)  HR 73  BP (!) 151/104  RR 16  O2 95 % None (Room air)    General: Awake, alert, in no acute distress  Eyes: Gaze conjugate.  No scleral icterus or injection  HENT: Normo-cephalic, atraumatic. No stridor  CV: Regular rate, regular rhythm. Radial pulses 2+ bilaterally  Resp: Breathing non-labored, speaking in full sentences.  Clear to auscultation bilaterally  GI: Soft, non-distended, non-tender. No rebound or guarding.  MSK/Extremities: No gross bony deformities. Moving all extremities, sensation intact  Skin: Warm. Appropriate color  Neurologic: Patient is awake and alert. Speech is clear. Face is symmetric without facial droop and facial sensation to light touch equal bilaterally. Uvula midline. Tongue protrusion midline. Hearing intact bilaterally. Full and equal shoulder shrug and head turn against resistance. 5/5 motor strength of UEs and LEs. Sensation to light touch intact in all four extremities. No pronator drift. Patient was walked and no gait abnormalities were notable.  Psych:  Appropriate mood and affect    Medical Decision Making & ED Course   Medical Decision Makin y.o. male with PMH of asthma, COPD. HTN here for back pain. Patient has no midline tenderness. Patient had no head trauma, LOC, headache or vision changes. Given fall on Friday with normal neuro exam less concern for SAH, ICH. Patient has no midline tenderness. No red flag signs symptoms including no fevers , night sweats, weight loss or immunocompromised state including no chronic steroid use and no history of cancer.  No history of urinary/stool incontinence or retention or saddle anesthesia. No history of IVDU. Given this do not suspect epidural abscess, mass, cauda equina, compression fracture or spinal compression.  Less concern for fracture.   Likely musculoskeletal in etiology.  No skin lesions concerning for zoster. Patient did have slight left sided 11-12 rib tenderness. CXR and XR left ribs ordered to rule out pneumothorax, hemothorax, rib fracture. Patient given meds for symptomatic treatment. Patient given prescription for meds. Patient was walked on neuro assessment with no abnormal gait. He reports symptomatic improvement on reassessment.    As a result of the work-up, patient was discharged home.  They were informed of their diagnosis and instructed to come back with any concerns or worsening of condition and was agreeable to the plan as discussed above.  The patient was given the opportunity to ask questions.  All of the patient's questions were answered.  The patient remained stable under my care.   ----  Differential diagnoses considered include but are not limited to: spine fracture, rib fracture, msk pain     Social Determinants of Health which Significantly Impact Care: None identified     EKG Independent Interpretation: EKG interpreted by myself. Please see ED Course for full interpretation.    Independent Result Review and Interpretation: None obtained    Chronic conditions affecting the patient's  care: As documented above in [facility]    The patient was discussed with the following consultants/services: None    Care Considerations: As documented above in [facility]    ED Course:  ED Course as of 01/15/25 2210   Wed Chapito 15, 2025   2204 I independently interpreted the CXR without evidence of pneumothorax, hemothorax, consolidation/pneumonia, pleural effusion, or widened mediastinum, rib fracture.  [AT]   2209 XR read:  1. Hyperinflated lungs. No evidence of focal consolidation, pleural  effusion, or pneumothorax. No displaced left rib fracture.   [AT]      ED Course User Index  [AT] Machelle Morin MD         Diagnoses as of 01/15/25 2210   Musculoskeletal pain     Disposition   As a result of the work-up, the patient was discharged home.  he was informed of his diagnosis and instructed to come back with any concerns or worsening of condition.  he and was agreeable to the plan as discussed above.  he was given the opportunity to ask questions.  All of the patient's questions were answered.    Procedures   Procedures    Patient seen and discussed with ED attending physician.    Machelle Morin MD  Emergency Medicine            Machelle Morin MD  Resident  01/15/25 2210

## 2025-02-07 DIAGNOSIS — J44.89 ASTHMA-COPD OVERLAP SYNDROME (MULTI): ICD-10-CM

## 2025-02-07 RX ORDER — FLUTICASONE PROPIONATE AND SALMETEROL 500; 50 UG/1; UG/1
1 POWDER RESPIRATORY (INHALATION)
Qty: 1 EACH | Refills: 3 | Status: SHIPPED | OUTPATIENT
Start: 2025-02-07

## 2025-02-11 ENCOUNTER — TELEPHONE (OUTPATIENT)
Dept: GASTROENTEROLOGY | Facility: HOSPITAL | Age: 65
End: 2025-02-11
Payer: MEDICAID

## 2025-02-11 DIAGNOSIS — D50.0 IRON DEFICIENCY ANEMIA DUE TO CHRONIC BLOOD LOSS: ICD-10-CM

## 2025-02-11 DIAGNOSIS — K52.9 COLITIS: Primary | ICD-10-CM

## 2025-02-11 RX ORDER — FERROUS SULFATE 325(65) MG
325 TABLET, DELAYED RELEASE (ENTERIC COATED) ORAL
Qty: 90 TABLET | Refills: 1 | Status: SHIPPED | OUTPATIENT
Start: 2025-02-11 | End: 2025-08-10

## 2025-02-11 NOTE — TELEPHONE ENCOUNTER
I called pt - spoke to him and his wife- per wife his finger nails are pale- I ordered CBC, iron studies and oral iron.    I also requested pt to make appt in 2 weeks to see me in clinic.    He has not been taking imodium and has diarrhea and intermittent BRBPR.    I re-explained that IFX week 14 level was very low in subtherapeutic range (No Abs)-and we will proceed with doubling IFX dose from 5mg/kg to 10mg/kg. If he does not do well after next dose, we will decrease interval to q4-6 weeks.     I explained that he should be compliant with AZA 100mg daily and he must take imodium upto 4 tabs a day as needed for diarrhea.    I would like to try the higher dose/shorter interval to optimize drug levels along with AZA prior to switching out of class at this stage when drug levels are sub optimal.    I also explained that he should go to ED if he has any worsening abd pain, diarrhea, BRBPR or sensory changes to fingers.

## 2025-02-13 LAB
CRP SERPL-MCNC: 3 MG/L
ERYTHROCYTE [DISTWIDTH] IN BLOOD BY AUTOMATED COUNT: 14.4 % (ref 11–15)
FERRITIN SERPL-MCNC: 9 NG/ML (ref 24–380)
HCT VFR BLD AUTO: 42.9 % (ref 38.5–50)
HGB BLD-MCNC: 12.9 G/DL (ref 13.2–17.1)
IRON SATN MFR SERPL: 6 % (CALC) (ref 20–48)
IRON SERPL-MCNC: 24 MCG/DL (ref 50–180)
MCH RBC QN AUTO: 23.2 PG (ref 27–33)
MCHC RBC AUTO-ENTMCNC: 30.1 G/DL (ref 32–36)
MCV RBC AUTO: 77 FL (ref 80–100)
PLATELET # BLD AUTO: 253 THOUSAND/UL (ref 140–400)
PMV BLD REES-ECKER: 12.6 FL (ref 7.5–12.5)
RBC # BLD AUTO: 5.57 MILLION/UL (ref 4.2–5.8)
TIBC SERPL-MCNC: 419 MCG/DL (CALC) (ref 250–425)
WBC # BLD AUTO: 6.3 THOUSAND/UL (ref 3.8–10.8)

## 2025-02-18 DIAGNOSIS — K52.9 COLITIS: Primary | ICD-10-CM

## 2025-02-18 DIAGNOSIS — E61.1 IRON DEFICIENCY: Primary | ICD-10-CM

## 2025-02-18 LAB — CALPROTECTIN STL-MCNT: 149 MCG/G

## 2025-02-18 RX ORDER — FERUMOXYTOL 510 MG/17ML
510 INJECTION INTRAVENOUS SEE ADMIN INSTRUCTIONS
Qty: 17 ML | Refills: 1 | Status: SHIPPED
Start: 2025-02-18

## 2025-02-18 RX ORDER — ALBUTEROL SULFATE 0.83 MG/ML
3 SOLUTION RESPIRATORY (INHALATION) AS NEEDED
OUTPATIENT
Start: 2025-02-18

## 2025-02-18 RX ORDER — EPINEPHRINE 0.3 MG/.3ML
0.3 INJECTION SUBCUTANEOUS EVERY 5 MIN PRN
OUTPATIENT
Start: 2025-02-18

## 2025-02-18 RX ORDER — FAMOTIDINE 10 MG/ML
20 INJECTION, SOLUTION INTRAVENOUS ONCE AS NEEDED
OUTPATIENT
Start: 2025-02-18

## 2025-02-18 RX ORDER — DIPHENHYDRAMINE HYDROCHLORIDE 50 MG/ML
50 INJECTION INTRAMUSCULAR; INTRAVENOUS AS NEEDED
OUTPATIENT
Start: 2025-02-18

## 2025-02-21 ENCOUNTER — TELEPHONE (OUTPATIENT)
Dept: GASTROENTEROLOGY | Facility: HOSPITAL | Age: 65
End: 2025-02-21
Payer: MEDICAID

## 2025-02-25 ENCOUNTER — OFFICE VISIT (OUTPATIENT)
Dept: GASTROENTEROLOGY | Facility: HOSPITAL | Age: 65
End: 2025-02-25
Payer: MEDICAID

## 2025-02-25 VITALS
WEIGHT: 194 LBS | HEART RATE: 82 BPM | BODY MASS INDEX: 27.06 KG/M2 | SYSTOLIC BLOOD PRESSURE: 134 MMHG | OXYGEN SATURATION: 89 % | DIASTOLIC BLOOD PRESSURE: 85 MMHG | TEMPERATURE: 97.7 F

## 2025-02-25 DIAGNOSIS — D50.0 IRON DEFICIENCY ANEMIA DUE TO CHRONIC BLOOD LOSS: ICD-10-CM

## 2025-02-25 DIAGNOSIS — K52.9 COLITIS: ICD-10-CM

## 2025-02-25 DIAGNOSIS — R14.3 FLATULENCE: ICD-10-CM

## 2025-02-25 DIAGNOSIS — R14.0 BLOATING: Primary | ICD-10-CM

## 2025-02-25 PROCEDURE — 99215 OFFICE O/P EST HI 40 MIN: CPT | Performed by: STUDENT IN AN ORGANIZED HEALTH CARE EDUCATION/TRAINING PROGRAM

## 2025-02-25 PROCEDURE — 3075F SYST BP GE 130 - 139MM HG: CPT | Performed by: STUDENT IN AN ORGANIZED HEALTH CARE EDUCATION/TRAINING PROGRAM

## 2025-02-25 PROCEDURE — 1036F TOBACCO NON-USER: CPT | Performed by: STUDENT IN AN ORGANIZED HEALTH CARE EDUCATION/TRAINING PROGRAM

## 2025-02-25 PROCEDURE — 3079F DIAST BP 80-89 MM HG: CPT | Performed by: STUDENT IN AN ORGANIZED HEALTH CARE EDUCATION/TRAINING PROGRAM

## 2025-02-25 RX ORDER — LOPERAMIDE HYDROCHLORIDE 2 MG/1
2 CAPSULE ORAL 4 TIMES DAILY PRN
Qty: 30 CAPSULE | Refills: 2 | Status: SHIPPED | OUTPATIENT
Start: 2025-02-25 | End: 2025-03-27

## 2025-02-25 RX ORDER — POLYETHYLENE GLYCOL 3350, SODIUM CHLORIDE, SODIUM BICARBONATE, POTASSIUM CHLORIDE 420; 11.2; 5.72; 1.48 G/4L; G/4L; G/4L; G/4L
4000 POWDER, FOR SOLUTION ORAL ONCE
Qty: 4000 ML | Refills: 0 | Status: SHIPPED | OUTPATIENT
Start: 2025-02-25 | End: 2025-02-25

## 2025-02-25 RX ORDER — SIMETHICONE 80 MG
80 TABLET,CHEWABLE ORAL EVERY 6 HOURS PRN
Qty: 30 TABLET | Refills: 2 | Status: SHIPPED | OUTPATIENT
Start: 2025-02-25 | End: 2025-04-26

## 2025-02-25 RX ORDER — FERROUS SULFATE 325(65) MG
325 TABLET, DELAYED RELEASE (ENTERIC COATED) ORAL
Qty: 90 TABLET | Refills: 1 | Status: SHIPPED | OUTPATIENT
Start: 2025-02-25 | End: 2025-08-24

## 2025-02-25 ASSESSMENT — ENCOUNTER SYMPTOMS
LOSS OF SENSATION IN FEET: 0
OCCASIONAL FEELINGS OF UNSTEADINESS: 0
DEPRESSION: 0

## 2025-02-25 ASSESSMENT — PAIN SCALES - GENERAL: PAINLEVEL_OUTOF10: 0-NO PAIN

## 2025-02-25 NOTE — PROGRESS NOTES
This is a 64 y.o. 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     Past hx:     Patient was diagnosed with Crohn's disease in 2001 with presenting sx of BRBPR. He was on asacol for several years before being switched to sulfasalazine. He had been on sulfasalazine 1.5g a day. He had never been on any biologics or immunomodulators. Fecal calprotectin in June 2021 was 1680 and he was started on lialda in June 2021. Fecal calpro downtrended to 111.     He did well until late 2024 when he had bloody diarrhea and colonoscopy showed severe L sided colitis w/ new flare sx- required prednisone taper and was transitioned to IFX in Oct 2024.     Other GI issues:      - Intraductal papillary mucinous neoplasms (IPMN)   *MRI pancreas 8/2022: unchanged [id number] panc uncinate process septated cystic lesion, likely side branch IPMN  *MRI pancreas w/wo contrast 6/2021: 1. No pancreatic mass. No biliary ductal dilatation or pancreatic duct dilatation. No choledocholithiasis. 2. A 2 cm septated cyst in the uncinate process, does not communicate with the main pancreatic duct, may represent side branch IPMN. No abnormal enhancement. Follow-up.  *He saw surgery follow up and they ordered surveillance MRI/MRCP on Nov 2023 which showed:  Stable cystic nonenhancing pancreatic uncinate lesion, likely representing a side-branch IPMN. Mild increase in diffuse prominence of the pancreatic duct, of doubtful clinical significance given lack of additional new findings. Mild interval increase in prominence of the extrahepatic bile duct without evidence of an obstructive mass.   *EUS FNA by Dr. Pettit in Dec 2023:  CBD was dilated to 10 mm without stricture or choledocholithiasis. The main pancreatic duct was dilated in the HoP up to 6 mm. The ampulla was normal without evidence of main duct involvement. His known pancreatic cyst was without high risk features. Cytology was negative for malignant cells. Fluid studies are consistent with a mucinous cyst, as known. Seen by surgery and they plan to perform annual MRI/MRCP surveillance.     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   *The Ab susceptibility testing showed that we should use bismuth quadruple or clarithromycin triple regimens both of which have been used in the past.   *He saw Dr. Ferraro in Feb 2024 for resistant HP and he recommended Quadruple therapy with the following regimen for 28 days: Amoxicillin 1 gm BID, Tetracycline 500 mg QID, Metronidazole 500 mg QID and Omeprazole 20 mg BID. Stool HP antigen in July 2024 was negative.     Interval hx:     Per pt, he has had some improvement since initiating IFX and is now s/p 4 doses at weeks 0, 2, 6, 14 weeks. Last infusion inflectra 5mg/kg on Jan 13th 2025.    IFX week 14 trough level was very low in subtherapeutic range (No Abs)-and we will proceed with doubling IFX dose from 5mg/kg to 10mg/kg for week 22 infusion on March 10th.      Per wife, she is unsure if he is taking AZA 100mg daily and he is taking imodium upto 2-4 tabs a day as needed for diarrhea.     He endorses 2-4 loose Bms a day which is significantly improved from 7-10 loose brown Bms a day that he was having in Jan 2025. He does endorses bloating and significant flatulence, and him having to be in the toilet for passing gas.    He is eating well. denies nocturnal BMs, denies urgency or accidents. Endorses chronic fatigue.     Denies dysphagia, odynophagia,  f/c, n/v, melena, wt loss, 12 point ROS done and neg unless otherwise stated.     Extraintestinal IBD manifestations: denies mucositis, skin rash, eye pain or redness; endorses chronic lower joint pains.    Labs Feb 2025: fecal calpro 149 (down from 1600), Hb 12.9, MCV 77, plt 253, ferritin 9, TIBC% 6%, crp 3    Labs Jan 2025 (week 14 trough): IFX level <0.5, ATI <20     Labs 9-11 2024: TPMT genotyping normal, fecal calpro 1600, Cr 0.92, LFTs WNL, CRP 0.95, hb 12.1, plt 301, Hep B/C and TB neg     Labs Feb 2024: Hb 15.8, plt 219, LFTs WNL, Cr 0.96     Labs Aug-Nov 2023: Hb 14.9, plt 230, CA 19-9 elevated at 49, H.pylori breath test pos in Sept 2023, ferritin 79, TIBC% 21%, Cr 1.03, LFTs WNL     Labs May 2023: Hb 13.6, plt 223, CRP 0.23, Fecal calpro 111,      Labs April 2023: Hb 11.5, plt 278, ferritin 92, TIBC% 11% Cr 1.15, LFTs WNL, July 2021: CRP WNL, June 2021: fecal calpro 1680        CTE 6/2021: No evidence of acute inflammatory change of the large or small bowel. The terminal ileum appears normal. No stricture, obstruction, fistula, or abscess. Prominent colonic stool noted, correlate for constipation.        Endoscopic Hx:  C-scope 5/2021: - The distal rectum and anal verge are normal on retroflexion view. Four 2 to 3 mm polyps in the descend ing colon, removed with a cold biopsy forceps. One 3 mm polyp in the ascending colon, removed with a cold biopsy forceps. Resected and retrieved (path: adenoma w/ LGD). Erythematous mucosa at the appendiceal orifice. Biopsied. Regional biopsies obtained in four quadrant fashion from 70cm to 10 cm- at least 32 biopsies obtained in total only visibile abnormalities: pseudopolyps throughout the colon. erythematous and friable Ao. scarring significant from transverse colon to sigmoid (path: no e/o granulomas or dysplasia or active colitis). Tried to get into TI but could not.      EGD July 2023: HP gastritis.     C-scope July 2023: Endoscopically mild inflammation from  transverse to rectum, histologically normal entire colon except for chronic quiescent colitis in rectum.     C-scope Aug 2024: Severe L sided colitis.     PMH/PSH: As above        Family history:  Denies family history of gastrointestinal cancers     Social history:  Reports that he quit smoking about 21 years ago. His smoking use included cigarettes. He started smoking about 41 years ago. He has a 20 pack-year smoking history. Denies alcohol or IVDU: Heroin, quit in 2014.        Physical exam:  Constitutional: Well-developed male in no acute distress.  HEENT: NC/AT, sclera anicteric  Respiratory: non labored breathing  Cardiovascular: RRR.  Abdominal: Soft, nondistended, nontender to palpation. No hepatosplenomegaly or masses.   Neuro: AAOx3. grossly intact.   MSK: No LE edema bilaterally.  Skin: Warm, dry. No jaundice  Psych: Appropriate mood and affect.        Assessment and Plan:  This is a 64 y.o. 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     Per pt, he has had some improvement since initiating IFX and is now s/p 4 doses at weeks 0, 2, 6, 14 weeks. Last infusion inflectra 5mg/kg on Jan 13th 2025. IFX week 14 trough level was very low in subtherapeutic range (No Abs)-and we will proceed with doubling IFX dose from 5mg/kg to 10mg/kg for week 22 infusion on March 10th.      Per wife, she is unsure if he is taking AZA 100mg daily and he is taking imodium upto 2-4 tabs a day as needed for diarrhea.     He endorses 2-4 loose Bms a day which is significantly improved from 7-10 loose brown Bms a day that he was having in Jan 2025. He does endorses bloating and significant flatulence,  and him having to be in the toilet for passing gas.    Overall, he has had sub-optimal clinical response after wee 14 dose, however marked improvement in fecal calpro 1600>149, we will escalate inflectra to 10mg/kg q8 weeks and counseled pt on adherence of AZA 100mg daily for combination therapy.     I revisited AEs on anti-TNFs: including injection site reaction, infection, reactivation of latent TB ad hep B, melanoma, NHL, HTSCL (When combined with a thiopurine), drug induced lupus, psoriasis and eczema among others as well as AEs of thiopurines: inlcuding nausea/vomiting, hepatitis, leukpenia, pancreatitis, infection, NHL, NMSC, cervical dysplasia (in women) among others.      Plan:  -continue with inflectra, we will increase to 10mg/kg  q8 weeks (next infusion on 10th March)  -We will also administer IV iron on 10th March  - continue with azathioprine 100mg once daily   -we have re-ordered MRI/MRCP for annual surveillance of pancreatic cyst, please call  to schedule   -repeat colonoscopy in April/May 2025 for endoscopic disease activity assessment of Crohn's colitis (vs L sided UC)  -please see dermatology for skin cancer screening (referral made)- please call  to schedule  -please continue imodium 2mg 3-4 times a day as needed for diarrhea  -start simethicone 80mg every 6 hours as needed for bloating  -we will order liver tests today     We will see you back in 3 months     Health maintenance in IBD: reference for PCP     -Vaccinations: counseled that it is recommended to get COVID-19 (including boosters), inactivated flu, pneumococcal, zoster (shingrix) vaccine, RSV vaccine.     -Osteoporosis screening by DEXA scan in patients with IBD if any risk factors for osteoporosis (low BMI, >3 mths cumulative steroid exposure, post-menopausal,hypogonadism). Repeat in 5 yrs if initial screen is normal--->>defer DEXA scan till next appointment     -Screen all patients with IBD for depression  (PHQ9) and anxiety (GAD7) at baseline and annually---->>pt denies s/s of depression    ----------- I will follow him for management of his chronic IBD--------

## 2025-02-25 NOTE — PATIENT INSTRUCTIONS
Thank you for coming to GI clinic today, your clinical picture and labs are improving which is reassuring, we will:    Plan:  -continue with infliximab, we will increase to 10mg/kg  q8 weeks (next infusion on 10th March)  -We will also administer IV iron on 10th March  - continue with azathioprine 100mg once daily   -we have re-ordered MRI/MRCP for annual surveillance of pancreatic cyst, please call  to schedule   -repeat colonoscopy in April/May 2025 for endoscopic disease activity assessment of Crohn's colitis  -please see dermatology for skin cancer screening (referral made)- please call  to schedule  -please continue imodium 2mg 3-4 times a day as needed for diarrhea  -start simethicone 80mg every 6 hours as needed for bloating  -we will order liver tests today     We will see you back in 3 months     Health maintenance in IBD: reference for PCP     -Vaccinations: counseled that it is recommended to get COVID-19 (including boosters), inactivated flu, pneumococcal, zoster (shingrix) vaccine, RSV vaccine.     -Osteoporosis screening by DEXA scan in patients with IBD if any risk factors for osteoporosis (low BMI, >3 mths cumulative steroid exposure, post-menopausal,hypogonadism). Repeat in 5 yrs if initial screen is normal--->>defer DEXA scan till next appointment     -Screen all patients with IBD for depression (PHQ9) and anxiety (GAD7) at baseline and annually---->>pt denies s/s of depression

## 2025-02-26 LAB
ALBUMIN SERPL-MCNC: 3.8 G/DL (ref 3.6–5.1)
ALBUMIN/GLOB SERPL: 1.1 (CALC) (ref 1–2.5)
ALP SERPL-CCNC: 73 U/L (ref 35–144)
ALT SERPL-CCNC: 21 U/L (ref 9–46)
AST SERPL-CCNC: 29 U/L (ref 10–35)
BILIRUB DIRECT SERPL-MCNC: 0.1 MG/DL
BILIRUB INDIRECT SERPL-MCNC: 0.6 MG/DL (CALC) (ref 0.2–1.2)
BILIRUB SERPL-MCNC: 0.7 MG/DL (ref 0.2–1.2)
GLOBULIN SER CALC-MCNC: 3.4 G/DL (CALC) (ref 1.9–3.7)
PROT SERPL-MCNC: 7.2 G/DL (ref 6.1–8.1)

## 2025-02-27 ENCOUNTER — TELEPHONE (OUTPATIENT)
Dept: GASTROENTEROLOGY | Facility: HOSPITAL | Age: 65
End: 2025-02-27
Payer: MEDICAID
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
## 2025-02-28 DIAGNOSIS — Q45.3 PANCREATIC DUCTAL ABNORMALITY: Primary | ICD-10-CM

## 2025-03-05 ENCOUNTER — DOCUMENTATION (OUTPATIENT)
Dept: INFUSION THERAPY | Facility: CLINIC | Age: 65
End: 2025-03-05
Payer: MEDICAID

## 2025-03-05 NOTE — PROGRESS NOTES
CLINICAL CLEARANCE FOR OUTPATIENT INFUSION      Patient to be scheduled for Feraheme (Ferumoxytol) infusions.     For Diagnosis: Iron Deficiency Anemia    Urine Hcg test ordered prior to first infusion?  Not applicable (If female pt <60 years of age and with reproductive capability assure order in place)  (If not ordered and is indicated please place order)    Review of Labs:  Lab Results   Component Value Date    HGB 12.9 (L) 02/12/2025    FERRITIN 9 (L) 02/12/2025    IRON 24 (L) 02/12/2025    TIBC 419 02/12/2025    MCHC 30.1 (L) 02/12/2025    MCV 77.0 (L) 02/12/2025    MCH 23.2 (L) 02/12/2025      Lab Results   Component Value Date    IRONSAT 6 (L) 02/12/2025        Do labs confirm diagnosis of iron deficiency? Yes    (If NO please reach out to prescribing provider prior to proceeding)      Okay to schedule for infusion as ordered by prescribing provider.

## 2025-03-10 ENCOUNTER — APPOINTMENT (OUTPATIENT)
Dept: INFUSION THERAPY | Facility: CLINIC | Age: 65
End: 2025-03-10
Payer: MEDICAID

## 2025-03-10 VITALS
RESPIRATION RATE: 18 BRPM | OXYGEN SATURATION: 98 % | HEART RATE: 74 BPM | BODY MASS INDEX: 27.03 KG/M2 | DIASTOLIC BLOOD PRESSURE: 78 MMHG | SYSTOLIC BLOOD PRESSURE: 110 MMHG | TEMPERATURE: 97.4 F | WEIGHT: 193.78 LBS

## 2025-03-10 DIAGNOSIS — K50.90 CROHN'S DISEASE WITHOUT COMPLICATION, UNSPECIFIED GASTROINTESTINAL TRACT LOCATION: ICD-10-CM

## 2025-03-10 DIAGNOSIS — E61.1 IRON DEFICIENCY: ICD-10-CM

## 2025-03-10 PROCEDURE — 96413 CHEMO IV INFUSION 1 HR: CPT | Performed by: NURSE PRACTITIONER

## 2025-03-10 PROCEDURE — 96365 THER/PROPH/DIAG IV INF INIT: CPT | Performed by: NURSE PRACTITIONER

## 2025-03-10 RX ORDER — ACETAMINOPHEN 325 MG/1
650 TABLET ORAL ONCE
Status: COMPLETED | OUTPATIENT
Start: 2025-03-10 | End: 2025-03-10

## 2025-03-10 RX ORDER — DIPHENHYDRAMINE HYDROCHLORIDE 50 MG/ML
50 INJECTION INTRAMUSCULAR; INTRAVENOUS AS NEEDED
OUTPATIENT
Start: 2025-03-17

## 2025-03-10 RX ORDER — ALBUTEROL SULFATE 0.83 MG/ML
3 SOLUTION RESPIRATORY (INHALATION) AS NEEDED
OUTPATIENT
Start: 2025-05-05

## 2025-03-10 RX ORDER — ACETAMINOPHEN 325 MG/1
650 TABLET ORAL ONCE
OUTPATIENT
Start: 2025-05-05 | End: 2025-05-05

## 2025-03-10 RX ORDER — EPINEPHRINE 0.3 MG/.3ML
0.3 INJECTION SUBCUTANEOUS EVERY 5 MIN PRN
OUTPATIENT
Start: 2025-05-05

## 2025-03-10 RX ORDER — DIPHENHYDRAMINE HYDROCHLORIDE 50 MG/ML
50 INJECTION INTRAMUSCULAR; INTRAVENOUS AS NEEDED
OUTPATIENT
Start: 2025-05-05

## 2025-03-10 RX ORDER — FAMOTIDINE 10 MG/ML
20 INJECTION, SOLUTION INTRAVENOUS ONCE AS NEEDED
OUTPATIENT
Start: 2025-03-17

## 2025-03-10 RX ORDER — EPINEPHRINE 0.3 MG/.3ML
0.3 INJECTION SUBCUTANEOUS EVERY 5 MIN PRN
OUTPATIENT
Start: 2025-03-17

## 2025-03-10 RX ORDER — ALBUTEROL SULFATE 0.83 MG/ML
3 SOLUTION RESPIRATORY (INHALATION) AS NEEDED
OUTPATIENT
Start: 2025-03-17

## 2025-03-10 RX ORDER — FAMOTIDINE 10 MG/ML
20 INJECTION, SOLUTION INTRAVENOUS ONCE AS NEEDED
OUTPATIENT
Start: 2025-05-05

## 2025-03-10 RX ADMIN — ACETAMINOPHEN 650 MG: 325 TABLET ORAL at 08:08

## 2025-03-10 ASSESSMENT — ENCOUNTER SYMPTOMS
TROUBLE SWALLOWING: 0
FEVER: 0
NAUSEA: 0
DIARRHEA: 1
DYSURIA: 0
APPETITE CHANGE: 0
MYALGIAS: 0
SORE THROAT: 0
WHEEZING: 0
FATIGUE: 0
FREQUENCY: 0
HEADACHES: 0
WOUND: 0
ARTHRALGIAS: 0
COUGH: 0
NUMBNESS: 0
UNEXPECTED WEIGHT CHANGE: 0
LEG SWELLING: 0
SHORTNESS OF BREATH: 0
PALPITATIONS: 0
DEPRESSION: 0
CHILLS: 0
DIZZINESS: 0
NERVOUS/ANXIOUS: 0
EYE PROBLEMS: 0
LIGHT-HEADEDNESS: 0
BLOOD IN STOOL: 1
VOICE CHANGE: 0
VOMITING: 0
EXTREMITY WEAKNESS: 0
HEMATURIA: 0
ABDOMINAL PAIN: 0
CONSTIPATION: 0

## 2025-03-10 NOTE — PATIENT INSTRUCTIONS
Today :We administered ferumoxytol (Feraheme) 510 mg in sodium chloride 0.9% 117 mL IV, acetaminophen, and inFLIXimab-dyyb (Inflectra) 900 mg in sodium chloride 0.9% 250 mL IV.     For:   1. Crohn's disease without complication, unspecified gastrointestinal tract location    2. Iron deficiency         Your next appointment is due in:  7 DAYS        Please read the  Medication Guide that was given to you and reviewed during todays visit.     (Tell all doctors including dentists that you are taking this medication)     Go to the emergency room or call 911 if:  -You have signs of allergic reaction:   -Rash, hives, itching.   -Swollen, blistered, peeling skin.   -Swelling of face, lips, mouth, tongue or throat.   -Tightness of chest, trouble breathing, swallowing or talking     Call your doctor:  - If IV / injection site gets red, warm, swollen, itchy or leaks fluid or pus.     (Leave dressing on your IV site for at least 2 hours and keep area clean and dry  - If you get sick or have symptoms of infection or are not feeling well for any reason.    (Wash your hands often, stay away from people who are sick)  - If you have side effects from your medication that do not go away or are bothersome.     (Refer to the teaching your nurse gave you for side effects to call your doctor about)    - Common side effects may include:  stuffy nose, headache, feeling tired, muscle aches, upset stomach  - Before receiving any vaccines     - Call [facility] at   If:  - You get sick, are on antibiotics, have had a recent vaccine, have surgery or dental work and your doctor wants your visit rescheduled.  - You need to cancel and reschedule your visit for any reason. Call at least 2 days before your visit if you need to cancel.   - Your insurance changes before your next visit.    (We will need to get approval from your new insurance. This can take up to two weeks.)     [facility] is opened Monday  thru Friday. We are closed on weekends and holidays.   Voice mail will take your call if the center is closed. If you leave a message please allow 24 hours for a call back during weekdays. If you leave a message on a weekend/holiday, we will call you back the next business day.    A pharmacist is available Monday - Friday from 8:30AM to 3:30PM to help answer any questions you may have about your prescriptions(s). Please call pharmacy at:    [facility]: [phone]  [facility]: [phone]  [facility]: [phone]

## 2025-03-10 NOTE — PROGRESS NOTES
[facility]   Infusion Clinic Note   Date: March 10, 2025   Name: Arnaldo Francisco  : 1960   MRN: [id number]         Reason for Visit: OP Infusion, Follow-up, and New Patient Visit (PT HERE FOR INFLECTRA 900 MG INFUSION/NEXT APPT: 56 DAYS//PT HERE FOR FERAHEME 510 MG INFUSION/NEXT APPT: 7 DAYS)         Today: We administered ferumoxytol (Feraheme) 510 mg in sodium chloride 0.9% 117 mL IV, acetaminophen, and inFLIXimab-dyyb (Inflectra) 900 mg in sodium chloride 0.9% 250 mL IV.       Ordered By: Lexie Zelaya MD       For a Diagnosis of: Crohn's disease without complication, unspecified gastrointestinal tract location    Iron deficiency       At today's visit patient accompanied by: Self      Vitals:   Vitals:    03/10/25 0746 03/10/25 0826 03/10/25 0935   BP: 139/85 123/85 110/78   Pulse: 72 73 74   Resp: 18 18 18   Temp: 36.1 °C (97 °F) 36.1 °C (97 °F) 36.3 °C (97.4 °F)   SpO2: 99% 95% 98%   Weight: 87.9 kg (193 lb 12.6 oz)               Pre - Treatment Checklist:      - Previous reaction to current treatment: no      Assess patient for the concerns below. Document provider notification as appropriate.  - Active or recent infection with/without current antibiotic use: no  - Recent or planned invasive dental work: no  - Recent or planned surgeries: no  - Recently received or plans to receive vaccinations: no  - Has treatment related toxicities: no  - Any chance may be pregnant:  n/a      Pain: 0   - Is the pain different from normal: n/a   - Is prescribing Doctor aware:  n/a      Labs: Reviewed       Fall Risk Screening: Wood Fall Risk  History of Falling, Immediate or Within 3 Months: No  Secondary Diagnosis: No  Ambulatory Aid: Walks without aid/bedrest/nurse assist  Intravenous Therapy/Heparin Lock: Yes  Gait/Transferring: Normal/bedrest/immobile  Mental Status: Oriented to own ability  Muir Fall Risk Score: 20       Review Of Systems:  Review of Systems   Constitutional:  Negative  "for appetite change, chills, fatigue, fever and unexpected weight change.   HENT:   Negative for hearing loss, mouth sores, sore throat, tinnitus, trouble swallowing and voice change.    Eyes:  Negative for eye problems.   Respiratory:  Negative for cough, shortness of breath and wheezing.    Cardiovascular:  Negative for chest pain, leg swelling and palpitations.   Gastrointestinal:  Positive for blood in stool and diarrhea. Negative for abdominal pain, constipation, nausea and vomiting.        PT WITH A DX OF IBD REPORTS #  10-15 LOOSE  BM'S PER DAY. reports NOTING BLOOD/MUCUS TO STOOLS.  reports C/O OF ABDOMINAL PAIN AND/OR BOUTS OF NOCTURNAL STOOLING.     Genitourinary:  Negative for dysuria, frequency and hematuria.    Musculoskeletal:  Negative for arthralgias and myalgias.   Skin:  Negative for itching, rash and wound.   Neurological:  Negative for dizziness, extremity weakness, headaches, light-headedness and numbness.   Psychiatric/Behavioral:  Negative for depression. The patient is not nervous/anxious.          Infusion Readiness:  - Assessment Concerns Related to Infusion: No  - Provider notified: n/a      New Patient Education:    N/A (returning patient for continuation of therapy. Ongoing education provided as needed.)        Treatment Conditions & Drug Specific Questions:    InFLIXimab  (AVSOLA, INFLECTRA, REMICADE, RENFLEXIS)    (Unless otherwise specified on patient specific therapy plan):     TREATMENT CONDITIONS:  Unless otherwise specified on patient specific therapy plan HOLD and notify Provider prior to proceeding with today's infusion if patient with:  o Positive T-Spot  o Reactive Hep B sAg and/or Hep B Core Antibody    Lab Results   Component Value Date    TBSIN Negative 09/10/2024      Lab Results   Component Value Date    HEPBSAG Nonreactive 09/10/2024      No results found for: \"NONUHFIRE\", \"NONUHSWGH\", \"NONUHFISH\", \"EXTHEPBSAG\"  Lab Results   Component Value Date    HEPBCAB Nonreactive " 09/10/2024     Lab Results   Component Value Date    HEPBCIGM Nonreactive 09/10/2024    HEPBCAB Nonreactive 09/10/2024    HEPCAB Nonreactive 09/10/2024        Patient meets treatment conditions? Yes    DRUG SPECIFIC QUESTIONS:    Any new or worsening signs / symptoms of heart failure which may include things like worsening shortness of breath, swelling, fatigue? No   (If yes notify provider before proceeding with today's infusion. New onset or worsening HF have been reported with infliximab)    REMINDER:   WEIGHT BASED DRUG     Recommended Vitals/Observation:  Vitals:     Induction: Obtain vital signs every 30 minutes; at end of observation period and as needed.     Maintenance: Obtain vital signs at start and end of infusions  Observation:     Induction: Patient is monitored for 30 minutes post-infusion      Maintenance: No observation.      (Unless otherwise specified on patient specific therapy plan):    REMINDERS:  May cause hypotension, patient should be in a reclined or semi-reclined position during infusion.    Ferumoxytol can interfere with MR imaging and Radiology should be notified if a patient has received ferumoxytol within 3 months of the anticipated MR.    Recommended Vitals/Observation:  Vitals: Obtain vital signs before, immediately following infusion, and 30 minutes after completion of infusion.  Observation: 30 minutes after each infusion. Observation complete.      Lab Results   Component Value Date    IRON 24 (L) 02/12/2025    TIBC 419 02/12/2025    FERRITIN 9 (L) 02/12/2025      Lab Results   Component Value Date    IRONSAT 6 (L) 02/12/2025      Lab Results   Component Value Date    WBC 6.3 02/12/2025    HGB 12.9 (L) 02/12/2025    HCT 42.9 02/12/2025    MCV 77.0 (L) 02/12/2025     02/12/2025             Weight Based Drug Calculations:    WEIGHT BASED DRUGS: Infliximab (REMICADE, INFLECTRA, RENFLEXIS)   Patient's dosing weight (kg): 88KG     10% weight variance for prescribed treatment:  79.2 kg to 96.8 kg     Patient's weight today:   Vitals:    03/10/25 0746   Weight: 87.9 kg (193 lb 12.6 oz)         weight range for prescribed dose:     Patient weight today falls outside of 10% variance or  weight range: No     Home Care pharmacist informed of weight variance: Not applicable    Doses that are weight based have an acceptable variance rule within 10% of the prescribed   order and/or within  weight range. If patient weight on day of infusion falls   outside of the 10% variance, or weight range, infusion is administered and   pharmacy contacted regarding future dosing adjustments, per policy.      Post Treatment: Patient tolerated treatment without issue and was discharged in no apparent distress.      Note Authored / Patient Cared for By: Cyn Darling RN

## 2025-03-17 ENCOUNTER — APPOINTMENT (OUTPATIENT)
Dept: INFUSION THERAPY | Facility: CLINIC | Age: 65
End: 2025-03-17
Payer: MEDICAID

## 2025-03-17 VITALS
BODY MASS INDEX: 27.81 KG/M2 | RESPIRATION RATE: 18 BRPM | DIASTOLIC BLOOD PRESSURE: 75 MMHG | WEIGHT: 199.41 LBS | OXYGEN SATURATION: 95 % | TEMPERATURE: 97.2 F | HEART RATE: 61 BPM | SYSTOLIC BLOOD PRESSURE: 120 MMHG

## 2025-03-17 DIAGNOSIS — B96.81 HELICOBACTER POSITIVE GASTRITIS: ICD-10-CM

## 2025-03-17 DIAGNOSIS — E61.1 IRON DEFICIENCY: ICD-10-CM

## 2025-03-17 DIAGNOSIS — K29.70 HELICOBACTER POSITIVE GASTRITIS: ICD-10-CM

## 2025-03-17 PROCEDURE — 96365 THER/PROPH/DIAG IV INF INIT: CPT | Performed by: NURSE PRACTITIONER

## 2025-03-17 RX ORDER — EPINEPHRINE 0.3 MG/.3ML
0.3 INJECTION SUBCUTANEOUS EVERY 5 MIN PRN
Status: CANCELLED | OUTPATIENT
Start: 2025-03-17

## 2025-03-17 RX ORDER — ALBUTEROL SULFATE 0.83 MG/ML
3 SOLUTION RESPIRATORY (INHALATION) AS NEEDED
Status: CANCELLED | OUTPATIENT
Start: 2025-03-17

## 2025-03-17 RX ORDER — FAMOTIDINE 10 MG/ML
20 INJECTION, SOLUTION INTRAVENOUS ONCE AS NEEDED
Status: CANCELLED | OUTPATIENT
Start: 2025-03-17

## 2025-03-17 RX ORDER — DIPHENHYDRAMINE HYDROCHLORIDE 50 MG/ML
50 INJECTION, SOLUTION INTRAMUSCULAR; INTRAVENOUS AS NEEDED
Status: CANCELLED | OUTPATIENT
Start: 2025-03-17

## 2025-03-17 RX ORDER — TETRACYCLINE HYDROCHLORIDE 500 MG/1
CAPSULE ORAL
Qty: 112 CAPSULE | Refills: 0 | Status: SHIPPED | OUTPATIENT
Start: 2025-03-17

## 2025-03-17 ASSESSMENT — ENCOUNTER SYMPTOMS
WOUND: 0
FEVER: 0
COUGH: 0
EXTREMITY WEAKNESS: 0
MYALGIAS: 0
CONSTIPATION: 0
ARTHRALGIAS: 0
DEPRESSION: 0
ABDOMINAL PAIN: 0
HEMATURIA: 0
APPETITE CHANGE: 0
NERVOUS/ANXIOUS: 0
BLOOD IN STOOL: 0
VOMITING: 0
FREQUENCY: 0
CHILLS: 0
VOICE CHANGE: 0
SORE THROAT: 0
LIGHT-HEADEDNESS: 0
HEADACHES: 0
WHEEZING: 0
SHORTNESS OF BREATH: 0
DIZZINESS: 0
DIARRHEA: 0
FATIGUE: 0
NUMBNESS: 0
UNEXPECTED WEIGHT CHANGE: 0
LEG SWELLING: 0
EYE PROBLEMS: 0
DYSURIA: 0
PALPITATIONS: 0
TROUBLE SWALLOWING: 0
NAUSEA: 0

## 2025-03-17 NOTE — PROGRESS NOTES
[facility]   Infusion Clinic Note   Date: 2025   Name: Arnaldo Francisco  : 1960   MRN: [id number]         Reason for Visit: OP Infusion and Follow-up (PT HERE FOR FERAHEME 510 MG INFUSION/NEXT APPT: NO NEXT FERAHEME APPT)         Today: We administered ferumoxytol (Feraheme) 510 mg in sodium chloride 0.9% 117 mL IV.       Ordered By: Lexie Zelaya MD       For a Diagnosis of: Iron deficiency       At today's visit patient accompanied by: Self      Today's Vitals:   Vitals:    25 0803 25 0851 25 0922   BP: 143/74 134/86 120/75   Pulse: 66 64 61   Resp: 16 18 18   Temp: 36.7 °C (98.1 °F) 36.2 °C (97.2 °F) 36.2 °C (97.2 °F)   SpO2: 95% 94% 95%   Weight: 90.5 kg (199 lb 6.5 oz)               Pre - Treatment Checklist:      - Previous reaction to current treatment: no      (Assess patient for the concerns below. Document provider notification as appropriate).  - Active or recent infection with/without current antibiotic use: no  - Recent or planned invasive dental work: no  - Recent or planned surgeries: no  - Recently received or plans to receive vaccinations: no  - Has treatment related toxicities: no  - Any chance may be pregnant:  n/a      Pain: 0   - Is the pain different from normal: n/a   - Is prescribing Doctor aware:  n/a      Labs: N/A      Fall Risk Screening: Muir Fall Risk  History of Falling, Immediate or Within 3 Months: No  Secondary Diagnosis: No  Ambulatory Aid: Walks without aid/bedrest/nurse assist  Intravenous Therapy/Heparin Lock: Yes  Gait/Transferring: Normal/bedrest/immobile  Mental Status: Oriented to own ability  Muir Fall Risk Score: 20       Review Of Systems:  Review of Systems   Constitutional:  Negative for appetite change, chills, fatigue, fever and unexpected weight change.   HENT:   Negative for hearing loss, mouth sores, sore throat, tinnitus, trouble swallowing and voice change.    Eyes:  Negative for eye problems.    Respiratory:  Negative for cough, shortness of breath and wheezing.    Cardiovascular:  Negative for chest pain, leg swelling and palpitations.   Gastrointestinal:  Negative for abdominal pain, blood in stool, constipation, diarrhea, nausea and vomiting.   Genitourinary:  Negative for dysuria, frequency and hematuria.    Musculoskeletal:  Negative for arthralgias and myalgias.   Skin:  Negative for itching, rash and wound.   Neurological:  Negative for dizziness, extremity weakness, headaches, light-headedness and numbness.   Psychiatric/Behavioral:  Negative for depression. The patient is not nervous/anxious.          Infusion Readiness:  - Assessment Concerns Related to Infusion: No  - Provider notified: n/a      New Patient Education:    N/A (returning patient for continuation of therapy. Ongoing education provided as needed.)        Treatment Conditions & Drug Specific Questions:    Ferumoxytol  (FERAHEME)    (Unless otherwise specified on patient specific therapy plan):    REMINDERS:  May cause hypotension, patient should be in a reclined or semi-reclined position during infusion.    Ferumoxytol can interfere with MR imaging and Radiology should be notified if a patient has received ferumoxytol within 3 months of the anticipated MR.    Recommended Vitals/Observation:  Vitals: Obtain vital signs before, immediately following infusion, and 30 minutes after completion of infusion.  Observation: 30 minutes after each infusion. Observation complete.      Lab Results   Component Value Date    IRON 24 (L) 02/12/2025    TIBC 419 02/12/2025    FERRITIN 9 (L) 02/12/2025      Lab Results   Component Value Date    IRONSAT 6 (L) 02/12/2025      Lab Results   Component Value Date    WBC 6.3 02/12/2025    HGB 12.9 (L) 02/12/2025    HCT 42.9 02/12/2025    MCV 77.0 (L) 02/12/2025     02/12/2025            Weight Based Drug Calculations:    WEIGHT BASED DRUGS: NOT APPLICABLE / FLAT DOSE       Post Treatment: Patient  tolerated treatment without issue and was discharged in no apparent distress.      Note Authored / Patient Cared for By: Cyn Darling RN         yes

## 2025-03-17 NOTE — PATIENT INSTRUCTIONS
Today :Arnaldo Francisco had no medications administered during this visit.     For:   1. Iron deficiency         Your next appointment is due in:  NO NEXT IRON APPT        Please read the  Medication Guide that was given to you and reviewed during todays visit.     (Tell all doctors including dentists that you are taking this medication)     Go to the emergency room or call 911 if:  -You have signs of allergic reaction:   -Rash, hives, itching.   -Swollen, blistered, peeling skin.   -Swelling of face, lips, mouth, tongue or throat.   -Tightness of chest, trouble breathing, swallowing or talking     Call your doctor:  - If IV / injection site gets red, warm, swollen, itchy or leaks fluid or pus.     (Leave dressing on your IV site for at least 2 hours and keep area clean and dry  - If you get sick or have symptoms of infection or are not feeling well for any reason.    (Wash your hands often, stay away from people who are sick)  - If you have side effects from your medication that do not go away or are bothersome.     (Refer to the teaching your nurse gave you for side effects to call your doctor about)    - Common side effects may include:  stuffy nose, headache, feeling tired, muscle aches, upset stomach  - Before receiving any vaccines     - Call the [facility] at   If:  - You get sick, are on antibiotics, have had a recent vaccine, have surgery or dental work and your doctor wants your visit rescheduled.  - You need to cancel and reschedule your visit for any reason. Call at least 2 days before your visit if you need to cancel.   - Your insurance changes before your next visit.    (We will need to get approval from your new insurance. This can take up to two weeks.)     [facility] is opened Monday thru Friday. We are closed on weekends and holidays.   Voice mail will take your call if the center is closed. If you leave a message please allow 24 hours for a call back during  weekdays. If you leave a message on a weekend/holiday, we will call you back the next business day.    A pharmacist is available Monday - Friday from 8:30AM to 3:30PM to help answer any questions you may have about your prescriptions(s). Please call pharmacy at:    [facility]: [phone]  [facility]: [phone]  [facility]: [phone]

## 2025-04-14 ENCOUNTER — APPOINTMENT (OUTPATIENT)
Dept: CARDIOLOGY | Facility: CLINIC | Age: 65
End: 2025-04-14
Payer: MEDICAID

## 2025-04-14 VITALS
DIASTOLIC BLOOD PRESSURE: 82 MMHG | HEIGHT: 71 IN | RESPIRATION RATE: 20 BRPM | WEIGHT: 196 LBS | BODY MASS INDEX: 27.44 KG/M2 | SYSTOLIC BLOOD PRESSURE: 135 MMHG | HEART RATE: 79 BPM | OXYGEN SATURATION: 91 %

## 2025-04-14 DIAGNOSIS — I25.118 CORONARY ARTERY DISEASE INVOLVING NATIVE HEART WITH OTHER FORM OF ANGINA PECTORIS, UNSPECIFIED VESSEL OR LESION TYPE: ICD-10-CM

## 2025-04-14 DIAGNOSIS — I50.22 CHRONIC SYSTOLIC CONGESTIVE HEART FAILURE: ICD-10-CM

## 2025-04-14 PROCEDURE — 3075F SYST BP GE 130 - 139MM HG: CPT | Performed by: STUDENT IN AN ORGANIZED HEALTH CARE EDUCATION/TRAINING PROGRAM

## 2025-04-14 PROCEDURE — 3008F BODY MASS INDEX DOCD: CPT | Performed by: STUDENT IN AN ORGANIZED HEALTH CARE EDUCATION/TRAINING PROGRAM

## 2025-04-14 PROCEDURE — 1036F TOBACCO NON-USER: CPT | Performed by: STUDENT IN AN ORGANIZED HEALTH CARE EDUCATION/TRAINING PROGRAM

## 2025-04-14 PROCEDURE — 3079F DIAST BP 80-89 MM HG: CPT | Performed by: STUDENT IN AN ORGANIZED HEALTH CARE EDUCATION/TRAINING PROGRAM

## 2025-04-14 PROCEDURE — 99215 OFFICE O/P EST HI 40 MIN: CPT | Performed by: STUDENT IN AN ORGANIZED HEALTH CARE EDUCATION/TRAINING PROGRAM

## 2025-04-14 RX ORDER — FUROSEMIDE 20 MG/1
20 TABLET ORAL DAILY PRN
Qty: 90 TABLET | Refills: 2 | Status: SHIPPED | OUTPATIENT
Start: 2025-04-14 | End: 2026-04-14

## 2025-04-14 RX ORDER — METOPROLOL SUCCINATE 200 MG/1
200 TABLET, EXTENDED RELEASE ORAL DAILY
Qty: 90 TABLET | Refills: 3 | Status: SHIPPED | OUTPATIENT
Start: 2025-04-14

## 2025-04-14 RX ORDER — SPIRONOLACTONE 25 MG/1
25 TABLET ORAL DAILY
Qty: 90 TABLET | Refills: 3 | Status: SHIPPED | OUTPATIENT
Start: 2025-04-14

## 2025-04-14 RX ORDER — SACUBITRIL AND VALSARTAN 97; 103 MG/1; MG/1
1 TABLET, FILM COATED ORAL 2 TIMES DAILY
Qty: 180 TABLET | Refills: 3 | Status: SHIPPED | OUTPATIENT
Start: 2025-04-14

## 2025-04-14 RX ORDER — DAPAGLIFLOZIN 10 MG/1
10 TABLET, FILM COATED ORAL DAILY
Qty: 90 TABLET | Refills: 3 | Status: SHIPPED | OUTPATIENT
Start: 2025-04-14

## 2025-04-14 ASSESSMENT — ENCOUNTER SYMPTOMS: DEPRESSION: 0

## 2025-04-14 NOTE — PROGRESS NOTES
Past cardiologist: Dr. PATRICIA Nguyen  Accompanied by : wife     Chief Complaint  Ambulatory heart failure care      History of Present Illness  64 y.o. medically disabled /watters who presents for advanced heart failure care. 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  Mr. Francisco has a history of HFmrEF and echocardiogram 1/27/2021 shows LVEF 45-50% LVIDD 5.5 cm with preserved right ventricular systolic function. His last coronary interrogation was a left heart catheterization 6/2015 which showed normal coronaries.Prev needed LiFeVest and had some LV recovery. On nuclear pharmacological stress test 4/2021 there was no inducible ischemia.  He had been lost to Cardiology team since 6/2021, HF GDMT was reinitiated and is now optimized.  He is fully adherent with all heart failure medications.  TTE 1/2025 demonstrated LVEF 40 to 45%, LVIDD 6.05 cm  He has been fully  adherent with heart failure medication therapy, which marks a significant improvement.    Mr Francisco continues to be troubled by bloody stools, despite escalated rx.    He denies chest pain, dyspnea at rest, exertional dyspnea, PND, leg swelling, syncope, presyncope, palpitations.  He continues to have a chronic two-pillow orthopnea which is unchanged.    He has mild exertional SOB with lifting quickly.    He has not been to the emergency room or been hospitalized for heart failure indications.    Surgical Hx:  - Nil    Social Hx:  - Smoking-ex-smoker, quit 2001 prev 1 PPD for 15-20 years  - ETOH- prev heavy drinker, quit heavy drinking.  He tells me he is down to 2-3 beers a day.  No  "longer drinking   - Illicit drugs-previous heroin use, quit   - Lives with wife   - has children - no heart disease    Family Hx:  Specifically, there is no family history of CAD,PPM, LVAD, OHT, arrhythmias, CVA, or sudden cardiac death.    Mother-  with \"heart problems\" in hr \"60s. \"enlarged heart\"  Brother-  with \"heart trouble\" and had ICD  Brother- \"heart trouble\"    Medication reconciliation completed, see below.     Investigations:    The electronic medical record has been reviewed by me for salient history. All cardiovascular imaging and testing available in the electronic medical record, and [facility] has been reviewed.     Medication Documentation Review Audit       Reviewed by Jessika Moreland RN (Registered Nurse) on 25 at 1106      Medication Order Taking? Sig Documenting Provider Last Dose Status   albuterol (Ventolin HFA) 90 mcg/actuation inhaler [id number] Yes Inhale. Historical Provider, MD  Active   albuterol 0.63 mg/3 mL nebulizer solution [id number] Yes Take 3 mL (0.63 mg) by nebulization 4 times a day as needed. Historical Provider, MD  Active   aspirin 81 mg EC tablet [id number] Yes Take 1 tablet (81 mg) by mouth once daily. Ghazal Simms MD PhD  Active   cyclobenzaprine (Flexeril) 5 mg tablet [id number]  Take 1 tablet (5 mg) by mouth 3 times a day for 3 days.   Patient not taking: Reported on 2025    Machelle Morin MD   25 2359   dapagliflozin propanediol (Farxiga) 10 mg [id number] Yes Take 1 tablet (10 mg) by mouth once daily. Ghazal Simms MD PhD  Active   ferrous sulfate 325 (65 Fe) mg EC tablet [id number] Yes Take 1 tablet by mouth once daily with breakfast. Do not crush, chew, or split. Lexie Zelaya MD  Active   ferumoxytol (Feraheme) 510 mg/17 mL (30 mg/mL) injection [id number] Yes Infuse 17 mL (510 mg) into a venous catheter see administration instructions for 2 doses. Lexie Zelaya MD  Active   fluticasone propion-salmeteroL (Advair Diskus) 500-50 mcg/dose " diskus inhaler [id number]  Inhale 1 puff 2 times a day.   Patient not taking: Reported on 4/14/2025    CLAIR Colby-CNP  Flag for Review   fluticasone propion-salmeteroL (Advair Diskus) 500-50 mcg/dose diskus inhaler [id number] Yes Inhale 1 puff 2 times a day. Rinse mouth with water after use to reduce aftertaste and incidence of candidiasis. Do not swallow. CLAIR Mata-CNP  Active   furosemide (Lasix) 20 mg tablet [id number] Yes Take 1 tablet (20 mg) by mouth once daily as needed (for weight gain of more than 3 lbs a day). Ghazal Simsm MD PhD  Active   gabapentin (Neurontin) 600 mg tablet [id number] Yes Take 1 tablet (600 mg) by mouth. Take 600 mg in the morning, 600 mg midday, and 1200 mg at bedtime Historical Provider, MD  Active   inFLIXimab-dyyb (Inflectra) 100 mg injection [id number] Yes Infuse 900 mg (10 mg/kg) into a venous catheter see administration instructions.  Infuse at week 0,2,and 6 weeks then every 8 weeks. Lexie Zelaya MD  Active   ipratropium/albuterol sulfate (COMBIVENT RESPIMAT INHL) [id number] Yes Inhale 2 puffs. Historical Provider, MD  Active   levocetirizine (Xyzal) 5 mg tablet [id number] Yes Take by mouth once daily. Historical Provider, MD  Active   lidocaine 4 % kit [id number]  Apply 1 patch topically once daily.   Patient not taking: Reported on 4/14/2025    Machelle Morin MD  Flag for Review   mesalamine (Lialda) 1.2 gram EC tablet [id number] Yes Take 1 tablet (1.2 g) by mouth 2 times a day. Do not crush, chew, or split. Lexie Zelaya MD  Active   methadone HCl (METHADONE ORAL) [id number] Yes Take 60 mL by mouth once daily. Pt states last dose was saturday Historical Provider, MD  Active   metoprolol succinate XL (Toprol-XL) 200 mg 24 hr tablet [id number] Yes Take 1 tablet (200 mg) by mouth once daily. Ghazal Simms MD PhD  Active   nitroglycerin (Nitrostat) 0.4 mg SL tablet [id number] Yes Place under the tongue. Dissolve 1 tab under the tongue as needed for chest  "pain every 5 minutes up to 3 times if no relief call 911 Historical Provider, MD  Active   omeprazole (PriLOSEC) 20 mg DR capsule [id number] Yes TAKE 1 CAPSULE(20 MG) BY MOUTH TWICE DAILY BEFORE MEALS. DO NOT CRUSH, CHEW, OR SPLIT Abelino Gaspar MD MPH  Active   pravastatin (Pravachol) 40 mg tablet [id number] Yes Take 1 tablet (40 mg) by mouth once daily at bedtime. Ghazal Simms MD PhD  Active   sacubitriL-valsartan (Entresto)  mg tablet [id number] Yes Take 1 tablet by mouth 2 times a day. Ghazal Simms MD PhD  Active   simethicone (Mylicon) 80 mg chewable tablet [id number] Yes Chew 1 tablet (80 mg) every 6 hours if needed for flatulence (bloating). Lexie Zelaya MD  Active   spironolactone (Aldactone) 25 mg tablet [id number] Yes Take 1 tablet (25 mg) by mouth once daily. Ghazal Simms MD PhD  Active   tamsulosin (Flomax) 0.4 mg 24 hr capsule [id number] Yes TAKE 1 CAPSULE BY MOUTH ONCE DAILY Aldair Gallardo MD  Active   tetracycline 500 mg capsule [id number]  TAKE 1 CAPSULE(500 MG) BY MOUTH FOUR TIMES DAILY   Patient not taking: Reported on 4/14/2025    Abelino Gaspar MD MPH  Flag for Review   tiotropium (Spiriva Respimat) 2.5 mcg/actuation inhaler [id number] Yes Inhale 2 puffs once daily. Maico Dempsey, APRN-CNP  Active                   Allergies   Allergen Reactions    Shellfish Containing Products Anaphylaxis    Ibuprofen Hives      Visit Vitals  /82 (BP Location: Right arm, Patient Position: Sitting, BP Cuff Size: Adult)   Pulse 79   Resp 20   Ht 1.803 m (5' 11\")   Wt 88.9 kg (196 lb)   SpO2 91%   BMI 27.34 kg/m²   Smoking Status Former   BSA 2.11 m²         IMPRESSION:    64 y.o. medically disabled /watters who presents for advanced heart failure care. He has a past medical history significant for hypertension, COPD/emphysema which has been characterized as severe and he was previously on supplemental nocturnal home oxygen, CHARLENE - has not needed CPAP, ex-smoker - quit " 2001, ex-heroin use, Crohn's disease ( has re-established with gastroenterology), COVID infection hospitalization 12/2020 he was treated with dexamethasone and low molecular weight heparin.  Mr. Francisco has a history of HFmrEF and echocardiogram 1/27/2021 shows LVEF 45-50% LVIDD 5.5 cm with preserved right ventricular systolic function. His last coronary interrogation was a left heart catheterization 6/2015 which showed normal coronaries.Prev needed LiFeVest and had some LV recovery. On nuclear pharmacological stress test 4/2021 there was no inducible ischemia.  He had been lost to Cardiology team since 6/2021 and HF GDMT was reinitiated and is now optimized.    TTE 1/2025 demonstrated LVEF 40 to 45%, LVIDD 6.05 cm  He has been fully  adherent with heart failure medication.    NYHA Functional Class: 1 (improved)  ACC/AHA Stage C heart failure  Volume status: Euvolaemic  Perfusion status: Warm to touch      PLAN:  -HF GDMT will be continued, details below.    -Genetic testing( 2 first degree relative with CM- mother and brother) requested at last visit, appointment pending  -No current need for device therapy, LVEF> 35%  -Heart failure lifestyle modifications discussed and Qs answered.    -CPET in 8/2025 when IN bleeding hopefully resolved   -Labs before next visit    -Medication optimisation:  BB: Continue metoprolol succinate  RAASi: Continue sacubitril/valsartan 97/103 mg twice daily   MRA: Continue spironolactone 25 mg once daily  SGLTi: Continue Dapaglilozin 10 mg qd  ISDN/Hydral: Continue to hold  Furosemide as needed with weight gain    #History of colitis with ongoing bloody frequent stools  Per gastroenterology    This note was transcribed using the Dragon Dictation system. There may be grammatical, punctuation, or verbiage errors that can occur with voice recognition programs.     Ghazal Simms MD PhD

## 2025-04-14 NOTE — PATIENT INSTRUCTIONS
Thank you for coming in today. If you have any questions or concerns, you may call the Heart Failure Office at [phone] option [phone], or [phone].  You may also contact our heart failure nursing team via email on [email or web address].    For quicker results set-up your  [employer] account to receive results and other correspondence directly to your phone.    Please bring all your pills/medications to your Cardiology appointments.    **  - No new medication changes today    -We will renew your heart failure medications today    - Please have the following tests done:  Blood tests this week ( RFP, BNP, CBC, iron, TIBC, ferritin)    2. Blood tests just before your next visit (RFP, BNP, CBC, iron, TIBC, ferritin)    3.  Cardiopulmonary exercise test in AUGUST 2025, CALL [phone] to schedule this test    - Please make an appointment to be seen in September 2025

## 2025-04-16 ENCOUNTER — HOSPITAL ENCOUNTER (OUTPATIENT)
Dept: RADIOLOGY | Facility: HOSPITAL | Age: 65
Discharge: HOME | End: 2025-04-16
Payer: MEDICAID
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
## 2025-04-16 PROCEDURE — 73630 X-RAY EXAM OF FOOT: CPT | Mod: RIGHT SIDE | Performed by: RADIOLOGY

## 2025-04-16 PROCEDURE — 73630 X-RAY EXAM OF FOOT: CPT | Mod: RT

## 2025-04-17 LAB
ALBUMIN SERPL-MCNC: 3.9 G/DL (ref 3.6–5.1)
ALBUMIN SERPL-MCNC: 4 G/DL (ref 3.6–5.1)
BASOPHILS # BLD AUTO: 32 CELLS/UL (ref 0–200)
BASOPHILS NFR BLD AUTO: 0.6 %
BNP SERPL-MCNC: 40 PG/ML
BNP SERPL-MCNC: 43 PG/ML
BUN SERPL-MCNC: 13 MG/DL (ref 7–25)
BUN SERPL-MCNC: 14 MG/DL (ref 7–25)
BUN/CREAT SERPL: ABNORMAL (CALC) (ref 6–22)
BUN/CREAT SERPL: ABNORMAL (CALC) (ref 6–22)
CALCIUM SERPL-MCNC: 9 MG/DL (ref 8.6–10.3)
CALCIUM SERPL-MCNC: 9.2 MG/DL (ref 8.6–10.3)
CHLORIDE SERPL-SCNC: 103 MMOL/L (ref 98–110)
CHLORIDE SERPL-SCNC: 103 MMOL/L (ref 98–110)
CO2 SERPL-SCNC: 26 MMOL/L (ref 20–32)
CO2 SERPL-SCNC: 28 MMOL/L (ref 20–32)
CREAT SERPL-MCNC: 0.83 MG/DL (ref 0.7–1.35)
CREAT SERPL-MCNC: 0.89 MG/DL (ref 0.7–1.35)
EGFRCR SERPLBLD CKD-EPI 2021: 96 ML/MIN/1.73M2
EGFRCR SERPLBLD CKD-EPI 2021: 98 ML/MIN/1.73M2
EOSINOPHIL # BLD AUTO: 119 CELLS/UL (ref 15–500)
EOSINOPHIL NFR BLD AUTO: 2.2 %
ERYTHROCYTE [DISTWIDTH] IN BLOOD BY AUTOMATED COUNT: 19 % (ref 11–15)
ERYTHROCYTE [DISTWIDTH] IN BLOOD BY AUTOMATED COUNT: 19.2 % (ref 11–15)
FERRITIN SERPL-MCNC: 112 NG/ML (ref 24–380)
GLUCOSE SERPL-MCNC: 106 MG/DL (ref 65–99)
GLUCOSE SERPL-MCNC: 108 MG/DL (ref 65–99)
HCT VFR BLD AUTO: 49.1 % (ref 38.5–50)
HCT VFR BLD AUTO: 49.4 % (ref 38.5–50)
HGB BLD-MCNC: 15.1 G/DL (ref 13.2–17.1)
HGB BLD-MCNC: 15.3 G/DL (ref 13.2–17.1)
IRON SATN MFR SERPL: 23 % (CALC) (ref 20–48)
IRON SERPL-MCNC: 72 MCG/DL (ref 50–180)
LYMPHOCYTES # BLD AUTO: 2354 CELLS/UL (ref 850–3900)
LYMPHOCYTES NFR BLD AUTO: 43.6 %
MCH RBC QN AUTO: 26 PG (ref 27–33)
MCH RBC QN AUTO: 26.3 PG (ref 27–33)
MCHC RBC AUTO-ENTMCNC: 30.8 G/DL (ref 32–36)
MCHC RBC AUTO-ENTMCNC: 31 G/DL (ref 32–36)
MCV RBC AUTO: 84.7 FL (ref 80–100)
MCV RBC AUTO: 85 FL (ref 80–100)
MONOCYTES # BLD AUTO: 626 CELLS/UL (ref 200–950)
MONOCYTES NFR BLD AUTO: 11.6 %
NEUTROPHILS # BLD AUTO: 2268 CELLS/UL (ref 1500–7800)
NEUTROPHILS NFR BLD AUTO: 42 %
PHOSPHATE SERPL-MCNC: 3.5 MG/DL (ref 2.5–4.5)
PHOSPHATE SERPL-MCNC: 3.8 MG/DL (ref 2.5–4.5)
PLATELET # BLD AUTO: 239 THOUSAND/UL (ref 140–400)
PLATELET # BLD AUTO: 256 THOUSAND/UL (ref 140–400)
PMV BLD REES-ECKER: 10.7 FL (ref 7.5–12.5)
PMV BLD REES-ECKER: 11.2 FL (ref 7.5–12.5)
POTASSIUM SERPL-SCNC: 4.4 MMOL/L (ref 3.5–5.3)
POTASSIUM SERPL-SCNC: 4.5 MMOL/L (ref 3.5–5.3)
RBC # BLD AUTO: 5.8 MILLION/UL (ref 4.2–5.8)
RBC # BLD AUTO: 5.81 MILLION/UL (ref 4.2–5.8)
SODIUM SERPL-SCNC: 139 MMOL/L (ref 135–146)
SODIUM SERPL-SCNC: 139 MMOL/L (ref 135–146)
TIBC SERPL-MCNC: 317 MCG/DL (CALC) (ref 250–425)
WBC # BLD AUTO: 5.3 THOUSAND/UL (ref 3.8–10.8)
WBC # BLD AUTO: 5.4 THOUSAND/UL (ref 3.8–10.8)

## 2025-04-18 ENCOUNTER — TELEPHONE (OUTPATIENT)
Dept: GASTROENTEROLOGY | Facility: HOSPITAL | Age: 65
End: 2025-04-18
Payer: MEDICAID

## 2025-04-22 DIAGNOSIS — K50.10 CROHN'S DISEASE OF LARGE INTESTINE WITHOUT COMPLICATION (MULTI): Primary | ICD-10-CM

## 2025-04-22 RX ORDER — LOPERAMIDE HYDROCHLORIDE 2 MG/1
2 CAPSULE ORAL 3 TIMES DAILY PRN
Qty: 30 CAPSULE | Refills: 1 | Status: SHIPPED | OUTPATIENT
Start: 2025-04-22 | End: 2025-05-22

## 2025-04-29 DIAGNOSIS — J43.9 PULMONARY EMPHYSEMA, UNSPECIFIED EMPHYSEMA TYPE (MULTI): ICD-10-CM

## 2025-04-29 RX ORDER — TIOTROPIUM BROMIDE INHALATION SPRAY 3.12 UG/1
2 SPRAY, METERED RESPIRATORY (INHALATION) DAILY
Qty: 4 G | Refills: 3 | Status: SHIPPED | OUTPATIENT
Start: 2025-04-29

## 2025-05-05 ENCOUNTER — APPOINTMENT (OUTPATIENT)
Dept: INFUSION THERAPY | Facility: CLINIC | Age: 65
End: 2025-05-05
Payer: MEDICAID

## 2025-05-05 ENCOUNTER — DOCUMENTATION (OUTPATIENT)
Dept: INFUSION THERAPY | Facility: CLINIC | Age: 65
End: 2025-05-05

## 2025-05-12 ENCOUNTER — APPOINTMENT (OUTPATIENT)
Dept: INFUSION THERAPY | Facility: CLINIC | Age: 65
End: 2025-05-12
Payer: MEDICAID

## 2025-05-12 VITALS
RESPIRATION RATE: 18 BRPM | WEIGHT: 198.52 LBS | DIASTOLIC BLOOD PRESSURE: 77 MMHG | BODY MASS INDEX: 27.69 KG/M2 | OXYGEN SATURATION: 93 % | SYSTOLIC BLOOD PRESSURE: 121 MMHG | TEMPERATURE: 97 F | HEART RATE: 62 BPM

## 2025-05-12 DIAGNOSIS — K50.90 CROHN'S DISEASE WITHOUT COMPLICATION, UNSPECIFIED GASTROINTESTINAL TRACT LOCATION: ICD-10-CM

## 2025-05-12 PROCEDURE — 96413 CHEMO IV INFUSION 1 HR: CPT | Performed by: NURSE PRACTITIONER

## 2025-05-12 RX ORDER — ALBUTEROL SULFATE 0.83 MG/ML
3 SOLUTION RESPIRATORY (INHALATION) AS NEEDED
OUTPATIENT
Start: 2025-06-30

## 2025-05-12 RX ORDER — ACETAMINOPHEN 325 MG/1
650 TABLET ORAL ONCE
OUTPATIENT
Start: 2025-06-30 | End: 2025-06-30

## 2025-05-12 RX ORDER — EPINEPHRINE 0.3 MG/.3ML
0.3 INJECTION SUBCUTANEOUS EVERY 5 MIN PRN
OUTPATIENT
Start: 2025-06-30

## 2025-05-12 RX ORDER — FAMOTIDINE 10 MG/ML
20 INJECTION, SOLUTION INTRAVENOUS ONCE AS NEEDED
OUTPATIENT
Start: 2025-06-30

## 2025-05-12 RX ORDER — DIPHENHYDRAMINE HYDROCHLORIDE 50 MG/ML
50 INJECTION, SOLUTION INTRAMUSCULAR; INTRAVENOUS AS NEEDED
OUTPATIENT
Start: 2025-06-30

## 2025-05-12 RX ORDER — ACETAMINOPHEN 325 MG/1
650 TABLET ORAL ONCE
Status: COMPLETED | OUTPATIENT
Start: 2025-05-12 | End: 2025-05-12

## 2025-05-12 RX ADMIN — ACETAMINOPHEN 650 MG: 325 TABLET ORAL at 07:51

## 2025-05-12 ASSESSMENT — ENCOUNTER SYMPTOMS
BRUISES/BLEEDS EASILY: 0
FEVER: 0
WOUND: 0
BLOOD IN STOOL: 1
COUGH: 0
ABDOMINAL PAIN: 0
DIARRHEA: 0
EYE PROBLEMS: 0
LEG SWELLING: 0
NUMBNESS: 0
CONSTIPATION: 0
SHORTNESS OF BREATH: 0
DIZZINESS: 0
HEMATURIA: 0
CHILLS: 0
DYSURIA: 0
SORE THROAT: 0
NERVOUS/ANXIOUS: 0
MYALGIAS: 0
EXTREMITY WEAKNESS: 0
UNEXPECTED WEIGHT CHANGE: 0
NAUSEA: 0
HEADACHES: 0
FREQUENCY: 0
VOMITING: 0
APPETITE CHANGE: 0
TROUBLE SWALLOWING: 0
VOICE CHANGE: 0
LIGHT-HEADEDNESS: 0
PALPITATIONS: 0
ARTHRALGIAS: 0
FATIGUE: 0
WHEEZING: 0
DEPRESSION: 0

## 2025-05-12 NOTE — PATIENT INSTRUCTIONS
Today :We administered acetaminophen and inFLIXimab-dyyb (Inflectra) 900 mg in sodium chloride 0.9% 250 mL IV.     For:   1. Crohn's disease without complication, unspecified gastrointestinal tract location         Your next appointment is due in:  56 DAYS        Please read the  Medication Guide that was given to you and reviewed during todays visit.     (Tell all doctors including dentists that you are taking this medication)     Go to the emergency room or call 911 if:  -You have signs of allergic reaction:   -Rash, hives, itching.   -Swollen, blistered, peeling skin.   -Swelling of face, lips, mouth, tongue or throat.   -Tightness of chest, trouble breathing, swallowing or talking     Call your doctor:  - If IV / injection site gets red, warm, swollen, itchy or leaks fluid or pus.     (Leave dressing on your IV site for at least 2 hours and keep area clean and dry  - If you get sick or have symptoms of infection or are not feeling well for any reason.    (Wash your hands often, stay away from people who are sick)  - If you have side effects from your medication that do not go away or are bothersome.     (Refer to the teaching your nurse gave you for side effects to call your doctor about)    - Common side effects may include:  stuffy nose, headache, feeling tired, muscle aches, upset stomach  - Before receiving any vaccines     - Call the [facility] at   If:  - You get sick, are on antibiotics, have had a recent vaccine, have surgery or dental work and your doctor wants your visit rescheduled.  - You need to cancel and reschedule your visit for any reason. Call at least 2 days before your visit if you need to cancel.   - Your insurance changes before your next visit.    (We will need to get approval from your new insurance. This can take up to two weeks.)     The [facility] is opened Monday thru Friday. We are closed on weekends and holidays.   Voice mail will take your call if  the center is closed. If you leave a message please allow 24 hours for a call back during weekdays. If you leave a message on a weekend/holiday, we will call you back the next business day.    A pharmacist is available Monday - Friday from 8:30AM to 3:30PM to help answer any questions you may have about your prescriptions(s). Please call pharmacy at:    [facility]: [phone]  [facility]: [phone]  [facility]: [phone]

## 2025-05-12 NOTE — PROGRESS NOTES
[facility]   Infusion Clinic Note   Date: May 12, 2025   Name: Arnaldo Francisco  : 1960   MRN: [id number]         Reason for Visit: OP Infusion and Follow-up (PT HERE FOR INFLECTRA 900 MG INFUSION/NEXT APPT: 56 DAYS)         Today: We administered acetaminophen and inFLIXimab-dyyb (Inflectra) 900 mg in sodium chloride 0.9% 250 mL IV.       Ordered By: Lexie Zelaya MD       For a Diagnosis of: Crohn's disease without complication, unspecified gastrointestinal tract location       At today's visit patient accompanied by: Self      Today's Vitals:   Vitals:    25 0742 25 0911   BP: 134/82 121/77   Pulse: 75 62   Resp: 16 18   Temp: 36.2 °C (97.1 °F) 36.1 °C (97 °F)   SpO2: 93% 93%   Weight: 90.1 kg (198 lb 8.4 oz)              Pre - Treatment Checklist:      - Previous reaction to current treatment: no      (Assess patient for the concerns below. Document provider notification as appropriate).  - Active or recent infection with/without current antibiotic use: no  - Recent or planned invasive dental work: no  - Recent or planned surgeries: no  - Recently received or plans to receive vaccinations: no  - Has treatment related toxicities: no  - Any chance may be pregnant:  n/a      Pain: 0   - Is the pain different from normal: n/a   - Is prescribing Doctor aware:  n/a      Labs: N/A      Fall Risk Screening: Muir Fall Risk  History of Falling, Immediate or Within 3 Months: No  Secondary Diagnosis: No  Ambulatory Aid: Walks without aid/bedrest/nurse assist  Intravenous Therapy/Heparin Lock: Yes  Gait/Transferring: Normal/bedrest/immobile  Mental Status: Oriented to own ability  Muir Fall Risk Score: 20       Review Of Systems:  Review of Systems   Constitutional:  Negative for appetite change, chills, fatigue, fever and unexpected weight change.   HENT:   Negative for hearing loss, mouth sores, sore throat, tinnitus, trouble swallowing and voice change.    Eyes:  Negative for  "eye problems.   Respiratory:  Negative for cough, shortness of breath and wheezing.    Cardiovascular:  Negative for chest pain, leg swelling and palpitations.   Gastrointestinal:  Positive for blood in stool. Negative for abdominal pain, constipation, diarrhea, nausea and vomiting.        PT WITH A DX OF IBD REPORTS #  10 LOOSE  BM'S PER DAY. reports NOTING BLOOD/MUCUS TO STOOLS.  reports C/O OF ABDOMINAL PAIN AND/OR BOUTS OF NOCTURNAL STOOLING.     Genitourinary:  Negative for dysuria, frequency and hematuria.    Musculoskeletal:  Negative for arthralgias and myalgias.   Skin:  Negative for itching, rash and wound.   Neurological:  Negative for dizziness, extremity weakness, headaches, light-headedness and numbness.   Hematological:  Does not bruise/bleed easily.   Psychiatric/Behavioral:  Negative for depression. The patient is not nervous/anxious.          Infusion Readiness:  - Assessment Concerns Related to Infusion: No  - Provider notified: n/a      New Patient Education:    N/A (returning patient for continuation of therapy. Ongoing education provided as needed.)        Treatment Conditions & Drug Specific Questions:    InFLIXimab  (AVSOLA, INFLECTRA, REMICADE, RENFLEXIS)    (Unless otherwise specified on patient specific therapy plan):     TREATMENT CONDITIONS:  Unless otherwise specified on patient specific therapy plan HOLD and notify Provider prior to proceeding with today's infusion if patient with:  o Positive T-Spot  o Reactive Hep B sAg and/or Hep B Core Antibody    Lab Results   Component Value Date    TBSIN Negative 09/10/2024      Lab Results   Component Value Date    HEPBSAG Nonreactive 09/10/2024      No results found for: \"NONUHFIRE\", \"NONUHSWGH\", \"NONUHFISH\", \"EXTHEPBSAG\"  Lab Results   Component Value Date    HEPBCAB Nonreactive 09/10/2024     Lab Results   Component Value Date    HEPBCIGM Nonreactive 09/10/2024    HEPBCAB Nonreactive 09/10/2024    HEPCAB Nonreactive 09/10/2024        Patient " meets treatment conditions? Yes    DRUG SPECIFIC QUESTIONS:    Any new or worsening signs / symptoms of heart failure which may include things like worsening shortness of breath, swelling, fatigue? No   (If yes notify provider before proceeding with today's infusion. New onset or worsening HF have been reported with infliximab)    REMINDER:   WEIGHT BASED DRUG     Recommended Vitals/Observation:  Vitals:     Induction: Obtain vital signs every 30 minutes; at end of observation period and as needed.     Maintenance: Obtain vital signs at start and end of infusions  Observation:     Induction: Patient is monitored for 30 minutes post-infusion      Maintenance: No observation.        Weight Based Drug Calculations:    WEIGHT BASED DRUGS: Infliximab (REMICADE, INFLECTRA, RENFLEXIS)   Patient's dosing weight (kg): 88KG     10% weight variance for prescribed treatment: 79.2 kg to 96.8 kg     Patient's weight today:   Vitals:    05/12/25 0742   Weight: 90.1 kg (198 lb 8.4 oz)         weight range for prescribed dose:     Patient weight today falls outside of 10% variance or  weight range: No    [facility] pharmacist informed of weight variance: Not applicable    Doses that are weight based have an acceptable variance rule within 10% of the prescribed   order and/or within  weight range. If patient weight on day of infusion falls   outside of the 10% variance, or weight range, infusion is administered and   pharmacy contacted regarding future dosing adjustments, per policy.      Post Treatment: Patient tolerated treatment without issue and was discharged in no apparent distress.      Note Authored / Patient Cared for By: Cyn Darling RN

## 2025-05-20 ENCOUNTER — SPECIALTY PHARMACY (OUTPATIENT)
Dept: PHARMACY | Facility: CLINIC | Age: 65
End: 2025-05-20

## 2025-05-20 ENCOUNTER — ANESTHESIA EVENT (OUTPATIENT)
Dept: GASTROENTEROLOGY | Facility: HOSPITAL | Age: 65
End: 2025-05-20
Payer: MEDICAID

## 2025-05-20 ENCOUNTER — HOSPITAL ENCOUNTER (OUTPATIENT)
Dept: GASTROENTEROLOGY | Facility: HOSPITAL | Age: 65
Discharge: HOME | End: 2025-05-20
Payer: MEDICAID

## 2025-05-20 ENCOUNTER — TELEPHONE (OUTPATIENT)
Dept: GASTROENTEROLOGY | Facility: HOSPITAL | Age: 65
End: 2025-05-20

## 2025-05-20 ENCOUNTER — ANESTHESIA (OUTPATIENT)
Dept: GASTROENTEROLOGY | Facility: HOSPITAL | Age: 65
End: 2025-05-20
Payer: MEDICAID

## 2025-05-20 VITALS
HEART RATE: 91 BPM | RESPIRATION RATE: 16 BRPM | BODY MASS INDEX: 26.88 KG/M2 | OXYGEN SATURATION: 94 % | WEIGHT: 192 LBS | SYSTOLIC BLOOD PRESSURE: 140 MMHG | DIASTOLIC BLOOD PRESSURE: 94 MMHG | TEMPERATURE: 97.7 F | HEIGHT: 71 IN

## 2025-05-20 DIAGNOSIS — K52.9 COLITIS: Primary | ICD-10-CM

## 2025-05-20 DIAGNOSIS — K51.211 ULCERATIVE PROCTITIS WITH RECTAL BLEEDING (MULTI): Primary | ICD-10-CM

## 2025-05-20 DIAGNOSIS — K50.819 CROHN'S DISEASE OF SMALL AND LARGE INTESTINES WITH COMPLICATION (MULTI): Primary | ICD-10-CM

## 2025-05-20 DIAGNOSIS — K51.911 ULCERATIVE COLITIS WITH RECTAL BLEEDING, UNSPECIFIED LOCATION (MULTI): ICD-10-CM

## 2025-05-20 PROCEDURE — A45380 PR COLONOSCOPY,BIOPSY

## 2025-05-20 PROCEDURE — 7100000009 HC PHASE TWO TIME - INITIAL BASE CHARGE

## 2025-05-20 PROCEDURE — 3700000002 HC GENERAL ANESTHESIA TIME - EACH INCREMENTAL 1 MINUTE

## 2025-05-20 PROCEDURE — 7100000010 HC PHASE TWO TIME - EACH INCREMENTAL 1 MINUTE

## 2025-05-20 PROCEDURE — 2500000004 HC RX 250 GENERAL PHARMACY W/ HCPCS (ALT 636 FOR OP/ED): Mod: JZ,SE

## 2025-05-20 PROCEDURE — 45380 COLONOSCOPY AND BIOPSY: CPT | Performed by: STUDENT IN AN ORGANIZED HEALTH CARE EDUCATION/TRAINING PROGRAM

## 2025-05-20 PROCEDURE — A45380 PR COLONOSCOPY,BIOPSY: Performed by: ANESTHESIOLOGY

## 2025-05-20 PROCEDURE — 3700000001 HC GENERAL ANESTHESIA TIME - INITIAL BASE CHARGE

## 2025-05-20 RX ORDER — EPINEPHRINE 0.3 MG/.3ML
0.3 INJECTION SUBCUTANEOUS EVERY 5 MIN PRN
OUTPATIENT
Start: 2025-05-20

## 2025-05-20 RX ORDER — LIDOCAINE HCL/PF 100 MG/5ML
SYRINGE (ML) INTRAVENOUS AS NEEDED
Status: DISCONTINUED | OUTPATIENT
Start: 2025-05-20 | End: 2025-05-20

## 2025-05-20 RX ORDER — MIDAZOLAM HYDROCHLORIDE 1 MG/ML
INJECTION INTRAMUSCULAR; INTRAVENOUS AS NEEDED
Status: DISCONTINUED | OUTPATIENT
Start: 2025-05-20 | End: 2025-05-20

## 2025-05-20 RX ORDER — LIDOCAINE IN NACL,ISO-OSMOT/PF 30 MG/3 ML
0.1 SYRINGE (ML) INJECTION ONCE
OUTPATIENT
Start: 2025-05-20 | End: 2025-05-20

## 2025-05-20 RX ORDER — SODIUM CHLORIDE, SODIUM LACTATE, POTASSIUM CHLORIDE, CALCIUM CHLORIDE 600; 310; 30; 20 MG/100ML; MG/100ML; MG/100ML; MG/100ML
100 INJECTION, SOLUTION INTRAVENOUS CONTINUOUS
OUTPATIENT
Start: 2025-05-20 | End: 2025-05-21

## 2025-05-20 RX ORDER — OXYCODONE HYDROCHLORIDE 5 MG/1
5 TABLET ORAL EVERY 4 HOURS PRN
Refills: 0 | OUTPATIENT
Start: 2025-05-20

## 2025-05-20 RX ORDER — FAMOTIDINE 10 MG/ML
20 INJECTION, SOLUTION INTRAVENOUS ONCE AS NEEDED
OUTPATIENT
Start: 2025-05-20

## 2025-05-20 RX ORDER — ALBUTEROL SULFATE 0.83 MG/ML
3 SOLUTION RESPIRATORY (INHALATION) AS NEEDED
OUTPATIENT
Start: 2025-05-20

## 2025-05-20 RX ORDER — MIRIKIZUMAB-MRKZ 20 MG/ML
300 INJECTION, SOLUTION INTRAVENOUS
Qty: 15 ML | Refills: 2 | Status: SHIPPED
Start: 2025-05-20 | End: 2025-05-22 | Stop reason: CLARIF

## 2025-05-20 RX ORDER — FENTANYL CITRATE 50 UG/ML
25 INJECTION, SOLUTION INTRAMUSCULAR; INTRAVENOUS EVERY 5 MIN PRN
Refills: 0 | OUTPATIENT
Start: 2025-05-20

## 2025-05-20 RX ORDER — METOCLOPRAMIDE HYDROCHLORIDE 5 MG/ML
10 INJECTION INTRAMUSCULAR; INTRAVENOUS ONCE AS NEEDED
OUTPATIENT
Start: 2025-05-20

## 2025-05-20 RX ORDER — PROPOFOL 10 MG/ML
INJECTION, EMULSION INTRAVENOUS AS NEEDED
Status: DISCONTINUED | OUTPATIENT
Start: 2025-05-20 | End: 2025-05-20

## 2025-05-20 RX ORDER — ALBUTEROL SULFATE 0.83 MG/ML
2.5 SOLUTION RESPIRATORY (INHALATION) ONCE AS NEEDED
OUTPATIENT
Start: 2025-05-20

## 2025-05-20 RX ORDER — HYDROMORPHONE HYDROCHLORIDE 1 MG/ML
0.5 INJECTION, SOLUTION INTRAMUSCULAR; INTRAVENOUS; SUBCUTANEOUS EVERY 5 MIN PRN
OUTPATIENT
Start: 2025-05-20

## 2025-05-20 RX ORDER — DIPHENHYDRAMINE HYDROCHLORIDE 50 MG/ML
50 INJECTION, SOLUTION INTRAMUSCULAR; INTRAVENOUS AS NEEDED
OUTPATIENT
Start: 2025-05-20

## 2025-05-20 RX ORDER — ONDANSETRON HYDROCHLORIDE 2 MG/ML
4 INJECTION, SOLUTION INTRAVENOUS ONCE AS NEEDED
OUTPATIENT
Start: 2025-05-20

## 2025-05-20 RX ORDER — MIRIKIZUMAB-MRKZ 100 MG/ML
200 INJECTION, SOLUTION SUBCUTANEOUS
Qty: 2 ML | Refills: 6 | Status: SHIPPED
Start: 2025-05-20 | End: 2025-05-22 | Stop reason: CLARIF

## 2025-05-20 RX ADMIN — MIDAZOLAM HYDROCHLORIDE 2 MG: 2 INJECTION, SOLUTION INTRAMUSCULAR; INTRAVENOUS at 09:14

## 2025-05-20 RX ADMIN — PROPOFOL 10 MG: 10 INJECTION, EMULSION INTRAVENOUS at 09:16

## 2025-05-20 RX ADMIN — PROPOFOL 10 MG: 10 INJECTION, EMULSION INTRAVENOUS at 09:17

## 2025-05-20 RX ADMIN — PROPOFOL 10 MG: 10 INJECTION, EMULSION INTRAVENOUS at 09:25

## 2025-05-20 RX ADMIN — PROPOFOL 75 MCG/KG/MIN: 10 INJECTION, EMULSION INTRAVENOUS at 09:15

## 2025-05-20 RX ADMIN — SODIUM CHLORIDE, SODIUM LACTATE, POTASSIUM CHLORIDE, AND CALCIUM CHLORIDE: 600; 310; 30; 20 INJECTION, SOLUTION INTRAVENOUS at 09:10

## 2025-05-20 RX ADMIN — PROPOFOL 10 MG: 10 INJECTION, EMULSION INTRAVENOUS at 09:42

## 2025-05-20 RX ADMIN — LIDOCAINE HYDROCHLORIDE 40 MG: 20 INJECTION INTRAVENOUS at 09:14

## 2025-05-20 RX ADMIN — PROPOFOL 30 MG: 10 INJECTION, EMULSION INTRAVENOUS at 09:14

## 2025-05-20 SDOH — HEALTH STABILITY: MENTAL HEALTH: CURRENT SMOKER: 0

## 2025-05-20 ASSESSMENT — PAIN SCALES - GENERAL
PAINLEVEL_OUTOF10: 0 - NO PAIN
PAIN_LEVEL: 0
PAINLEVEL_OUTOF10: 0 - NO PAIN

## 2025-05-20 ASSESSMENT — PAIN - FUNCTIONAL ASSESSMENT
PAIN_FUNCTIONAL_ASSESSMENT: 0-10

## 2025-05-20 ASSESSMENT — ENCOUNTER SYMPTOMS
BLOOD IN STOOL: 1
ABDOMINAL DISTENTION: 1
DIARRHEA: 1
ABDOMINAL PAIN: 1

## 2025-05-20 NOTE — H&P
"History Of Present Illness  Arnaldo Francisco is a 64 y.o. male presenting for a diagnostic colonoscopy to evaluate disease severity/remission on new treatment.    The patient has UC and is being treated with infliximab and mesalamine.    Last colonoscopy 8/2024 with chronic active colitis in the cecum, descending, and sigmoid colon.     Past Medical History  Medical History[1]  Surgical History  Surgical History[2]  Social History  He reports that he quit smoking about 22 years ago. His smoking use included cigarettes. He started smoking about 42 years ago. He has a 20 pack-year smoking history. He has never been exposed to tobacco smoke. He has never used smokeless tobacco. He reports that he does not currently use alcohol after a past usage of about 1.0 standard drink of alcohol per week. He reports that he does not currently use drugs after having used the following drugs: Heroin.    Family History  Family History[3]     Allergies  Allergies[4]  Review of Systems   Gastrointestinal:  Positive for abdominal distention, abdominal pain, blood in stool and diarrhea.   All other systems reviewed and are negative.       Physical Exam  Vitals reviewed.   Constitutional:       Appearance: Normal appearance.   Eyes:      Pupils: Pupils are equal, round, and reactive to light.   Cardiovascular:      Heart sounds: Normal heart sounds.   Pulmonary:      Breath sounds: Normal breath sounds.   Abdominal:      General: There is distension.      Tenderness: There is abdominal tenderness. There is no guarding or rebound.   Neurological:      General: No focal deficit present.      Mental Status: He is alert and oriented to person, place, and time.   Psychiatric:         Mood and Affect: Mood normal.         Behavior: Behavior normal.       Last Recorded Vitals  Blood pressure (!) 157/92, pulse 105, temperature 36.8 °C (98.2 °F), temperature source Temporal, resp. rate 18, height 1.803 m (5' 11\"), weight 87.1 kg (192 lb), SpO2 " 93%.    Assessment/Plan   Arnaldo Francisco is a 64 y.o. male presenting for a diagnostic colonoscopy to evaluate disease severity/remission on new treatment.     Mia Bee MD         [1]   Past Medical History:  Diagnosis Date    CHF (congestive heart failure)     COPD (chronic obstructive pulmonary disease) (Multi)     Crohn's disease (Multi)     Gastro-esophageal reflux disease without esophagitis     HTN (hypertension)     On home oxygen therapy     Opioid use, unspecified, uncomplicated     Heroin use, on methadone therapy    CHARLENE (obstructive sleep apnea)     Pancreatic cyst ([facility])     Personal history of transient ischemic attack (TIA), and cerebral infarction without residual deficits     History of stroke   [2]   Past Surgical History:  Procedure Laterality Date    COLONOSCOPY      ESOPHAGOGASTRODUODENOSCOPY      KNEE ARTHROSCOPY W/ MENISCAL REPAIR     [3]   Family History  Problem Relation Name Age of Onset    Hypertension Mother      Heart disease Mother      Heart disease Brother      Hypertension Sibling     [4]   Allergies  Allergen Reactions    Shellfish Containing Products Anaphylaxis    Ibuprofen Hives

## 2025-05-20 NOTE — TELEPHONE ENCOUNTER
Pt was called and we discussed further steps for his severe proctosigmoiditis.    Pt continues to have daily BRBPR and intermittent diarrhea 4-7x a day. 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    Of note, in Jan 2025, he was increased from 5mg/kg to 10mg/kg IFX and AZA incrased to 100mg daily- he has been on high dose combo therapy for 5 mths and on combo therapy since Aug 2024.    We discussed increasing freq of IFX to 10g/kg q6 weeks with continued AZ, vs IL-12/23 vs Darya.    Given his current sx on high dose combo therapy, I doubt increasing IFX freq would have much impact and pt does not wish to wait further for trough level given current clinical sx.    We discussed AE profile and efficacy or Darya vs IL-12/23 inhibitors and given his underlyign co-morbidities including HFrEF, he would like to proceed with IL-12/23 inhibitors, which is very reasonable. We will start PA process for omvoh.

## 2025-05-20 NOTE — ANESTHESIA PREPROCEDURE EVALUATION
Patient: Arnaldo Francisco    Procedure Information       Date/Time: 05/20/25 0815    Scheduled providers: Lexie Zelaya MD    Procedure: COLONOSCOPY    Location: [facility]            Relevant Problems   Anesthesia   (+) Broken teeth      Cardiac   (+) Chest pain   (+) Chronic systolic congestive heart failure   (+) Congestive heart failure   (+) Essential hypertension   (+) Hyperlipidemia      Pulmonary   (+) Asthma   (+) Chronic obstructive pulmonary disease (Multi)   (+) Pneumonia      Neuro   (+) Methadone dependence (Multi)      GI   (+) Crohn's disease (Multi)      ID   (+) COVID-19 virus infection   (+) Pneumonia       Clinical information reviewed:    Allergies                NPO Detail:  NPO/Void Status  Date of Last Liquid: 05/20/25  Time of Last Liquid: 0600  Date of Last Solid: 05/18/25  Last Intake Type: GI prep         Physical Exam    Airway  Mallampati: II  TM distance: >3 FB  Neck ROM: full  Mouth opening: 3 or more finger widths     Cardiovascular - normal exam  Rhythm: regular  Rate: normal     Dental    Pulmonary Breath sounds clear to auscultation  (+) decreased breath sounds     Abdominal Abdomen: soft             Anesthesia Plan    History of general anesthesia?: yes  History of complications of general anesthesia?: no    ASA 4     MAC   (    Risks discussed to Patient's satisfaction  )  The patient is not a current smoker.    intravenous induction   Postoperative pain plan includes opioids.  Anesthetic plan and risks discussed with patient.    Plan discussed with CAA and CRNA.

## 2025-05-20 NOTE — ANESTHESIA POSTPROCEDURE EVALUATION
Patient: Arnaldo Francisco    Procedure Summary       Date: 05/20/25 Room / Location: [facility]    Anesthesia Start: 0910 Anesthesia Stop: 0951    Procedure: COLONOSCOPY Diagnosis: Colitis    Scheduled Providers: Lexie Zelaya MD Responsible Provider: Dudley Chambers MD    Anesthesia Type: MAC ASA Status: 4            Anesthesia Type: MAC    Vitals Value Taken Time   /80 05/20/25 09:51   Temp 36.5 05/20/25 09:51   Pulse 96 05/20/25 09:51   Resp 20 05/20/25 09:51   SpO2 96 05/20/25 09:51       Anesthesia Post Evaluation    Patient location during evaluation: PACU  Patient participation: complete - patient cannot participate  Level of consciousness: sleepy but conscious  Pain score: 0  Pain management: adequate  Airway patency: patent  Cardiovascular status: acceptable  Respiratory status: acceptable  Hydration status: acceptable  Postoperative Nausea and Vomiting: none  Comments: Pt. Seen and examined.  No apparent anesthetic complications.          No notable events documented.

## 2025-05-20 NOTE — DISCHARGE INSTRUCTIONS
Patient Instructions after a Colonoscopy      The anesthetics, sedatives or narcotics which were given to you today will be acting in your body for the next 24 hours, so you might feel a little sleepy or groggy.  This feeling should slowly wear off. Carefully read and follow the instructions.     You received sedation today:  - Do not drive or operate any machinery or power tools of any kind.   - No alcoholic beverages today, not even beer or wine.  - Do not make any important decisions or sign any legal documents.  - No over the counter medications that contain alcohol or that may cause drowsiness.  - Do not make any important decisions or sign any legal documents.  - Make sure you have someone with you for first 24 hours.    While it is common to experience mild to moderate abdominal distention, gas, or belching after your procedure, if any of these symptoms occur following discharge from the GI Lab or within one week of having your procedure, call the [facility] to be advised whether a visit to your nearest Urgent Care or Emergency Department is indicated.  Take this paper with you if you go.     - If you develop an allergic reaction to the medications that were given during your procedure such as difficulty breathing, rash, hives, severe nausea, vomiting or lightheadedness.  - If you experience chest pain, shortness of breath, severe abdominal pain, fevers and chills.  -If you develop signs and symptoms of bleeding such as blood in your spit, if your stools turn black, tarry, or bloody  - If you have not urinated within 8 hours following your procedure.  - If your IV site becomes painful, red, inflamed, or looks infected.    If you received a biopsy/polypectomy/sphincterotomy the following instructions apply below:    __ Do not use Aspirin containing products, non-steroidal medications or anti-coagulants for one week following your procedure. (Examples of these types of medications are: Advil,  Arthrotec, Aleve, Coumadin, Ecotrin, Heparin, Ibuprofen, Indocin, Motrin, Naprosyn, Nuprin, Plavix, Vioxx, and Voltarin, or their generic forms.  This list is not all-inclusive.  Check with your physician or pharmacist before resuming medications.)   __ Eat a soft diet today.  Avoid foods that are poorly digested for the next 24 hours.  These foods would include: nuts, beans, lettuce, red meats, and fried foods. Start with liquids and advance your diet as tolerated, gradually work up to eating solids.   __ Do not have a Barium Study or Enema for one week.    Your physician recommends the additional following instructions:    -You have a contact number available for emergencies. The signs and symptoms of potential delayed complications were discussed with you. You may return to normal activities tomorrow.  -Resume your previous diet.  -Continue your present medications.   -We are waiting for your pathology results.  -Your physician has recommended a repeat colonoscopy (date to be determined after pending pathology results are reviewed) for surveillance based on pathology results.  -The findings and recommendations have been discussed with you.  -The findings and recommendations were discussed with your family.  - Please see Medication Reconciliation Form for new medication/medications prescribed.       If you experience any problems or have any questions following discharge from the GI Lab, please call:        Nurse Signature                                                                        Date___________________                                                                            Patient/Responsible Party Signature                                        Date___________________

## 2025-05-22 DIAGNOSIS — K50.90 CROHN'S DISEASE WITHOUT COMPLICATION, UNSPECIFIED GASTROINTESTINAL TRACT LOCATION: Primary | ICD-10-CM

## 2025-05-22 RX ORDER — GUSELKUMAB 200 MG/2ML
400 INJECTION SUBCUTANEOUS
Qty: 12 ML | Refills: 0 | Status: SHIPPED | OUTPATIENT
Start: 2025-05-22

## 2025-05-22 RX ORDER — GUSELKUMAB 200 MG/2ML
200 INJECTION SUBCUTANEOUS
Qty: 2 ML | Refills: 11 | Status: SHIPPED | OUTPATIENT
Start: 2025-05-22

## 2025-05-23 ENCOUNTER — HOSPITAL ENCOUNTER (EMERGENCY)
Facility: HOSPITAL | Age: 65
Discharge: HOME | End: 2025-05-23
Attending: EMERGENCY MEDICINE
Payer: MEDICAID

## 2025-05-23 ENCOUNTER — APPOINTMENT (OUTPATIENT)
Dept: CARDIOLOGY | Facility: HOSPITAL | Age: 65
End: 2025-05-23
Payer: MEDICAID

## 2025-05-23 ENCOUNTER — APPOINTMENT (OUTPATIENT)
Dept: RADIOLOGY | Facility: HOSPITAL | Age: 65
End: 2025-05-23
Payer: MEDICAID

## 2025-05-23 ENCOUNTER — SPECIALTY PHARMACY (OUTPATIENT)
Dept: PHARMACY | Facility: CLINIC | Age: 65
End: 2025-05-23

## 2025-05-23 VITALS
SYSTOLIC BLOOD PRESSURE: 137 MMHG | TEMPERATURE: 98.6 F | OXYGEN SATURATION: 99 % | WEIGHT: 213.85 LBS | BODY MASS INDEX: 29.94 KG/M2 | DIASTOLIC BLOOD PRESSURE: 79 MMHG | RESPIRATION RATE: 18 BRPM | HEIGHT: 71 IN | HEART RATE: 93 BPM

## 2025-05-23 DIAGNOSIS — R06.00 DYSPNEA, UNSPECIFIED TYPE: ICD-10-CM

## 2025-05-23 DIAGNOSIS — R07.9 CHEST PAIN, UNSPECIFIED TYPE: Primary | ICD-10-CM

## 2025-05-23 DIAGNOSIS — I10 DIASTOLIC HYPERTENSION: ICD-10-CM

## 2025-05-23 DIAGNOSIS — J18.9 PNEUMONIA DUE TO INFECTIOUS ORGANISM, UNSPECIFIED LATERALITY, UNSPECIFIED PART OF LUNG: ICD-10-CM

## 2025-05-23 DIAGNOSIS — R10.11 RIGHT UPPER QUADRANT ABDOMINAL PAIN: ICD-10-CM

## 2025-05-23 LAB
ALBUMIN SERPL BCP-MCNC: 3.5 G/DL (ref 3.4–5)
ALP SERPL-CCNC: 56 U/L (ref 33–136)
ALT SERPL W P-5'-P-CCNC: 11 U/L (ref 10–52)
ANION GAP SERPL CALCULATED.3IONS-SCNC: 9 MMOL/L (ref 10–20)
AST SERPL W P-5'-P-CCNC: 14 U/L (ref 9–39)
BASOPHILS # BLD AUTO: 0.04 X10*3/UL (ref 0–0.1)
BASOPHILS NFR BLD AUTO: 0.3 %
BILIRUB SERPL-MCNC: 1.3 MG/DL (ref 0–1.2)
BNP SERPL-MCNC: 38 PG/ML (ref 0–99)
BUN SERPL-MCNC: 11 MG/DL (ref 6–23)
CALCIUM SERPL-MCNC: 9 MG/DL (ref 8.6–10.3)
CARDIAC TROPONIN I PNL SERPL HS: 14 NG/L (ref 0–20)
CARDIAC TROPONIN I PNL SERPL HS: 18 NG/L (ref 0–20)
CHLORIDE SERPL-SCNC: 101 MMOL/L (ref 98–107)
CO2 SERPL-SCNC: 28 MMOL/L (ref 21–32)
CREAT SERPL-MCNC: 0.97 MG/DL (ref 0.5–1.3)
EGFRCR SERPLBLD CKD-EPI 2021: 88 ML/MIN/1.73M*2
EOSINOPHIL # BLD AUTO: 0.04 X10*3/UL (ref 0–0.7)
EOSINOPHIL NFR BLD AUTO: 0.3 %
ERYTHROCYTE [DISTWIDTH] IN BLOOD BY AUTOMATED COUNT: 16.4 % (ref 11.5–14.5)
GLUCOSE SERPL-MCNC: 116 MG/DL (ref 74–99)
HCT VFR BLD AUTO: 45.5 % (ref 41–52)
HGB BLD-MCNC: 14 G/DL (ref 13.5–17.5)
IMM GRANULOCYTES # BLD AUTO: 0.04 X10*3/UL (ref 0–0.7)
IMM GRANULOCYTES NFR BLD AUTO: 0.3 % (ref 0–0.9)
LACTATE SERPL-SCNC: 0.6 MMOL/L (ref 0.4–2)
LYMPHOCYTES # BLD AUTO: 2.52 X10*3/UL (ref 1.2–4.8)
LYMPHOCYTES NFR BLD AUTO: 20.6 %
MCH RBC QN AUTO: 27.2 PG (ref 26–34)
MCHC RBC AUTO-ENTMCNC: 30.8 G/DL (ref 32–36)
MCV RBC AUTO: 88 FL (ref 80–100)
MONOCYTES # BLD AUTO: 1.52 X10*3/UL (ref 0.1–1)
MONOCYTES NFR BLD AUTO: 12.4 %
NEUTROPHILS # BLD AUTO: 8.07 X10*3/UL (ref 1.2–7.7)
NEUTROPHILS NFR BLD AUTO: 66.1 %
NRBC BLD-RTO: 0 /100 WBCS (ref 0–0)
PLATELET # BLD AUTO: 202 X10*3/UL (ref 150–450)
POTASSIUM SERPL-SCNC: 4.1 MMOL/L (ref 3.5–5.3)
PROT SERPL-MCNC: 7.1 G/DL (ref 6.4–8.2)
RBC # BLD AUTO: 5.15 X10*6/UL (ref 4.5–5.9)
SODIUM SERPL-SCNC: 134 MMOL/L (ref 136–145)
WBC # BLD AUTO: 12.2 X10*3/UL (ref 4.4–11.3)

## 2025-05-23 PROCEDURE — 84484 ASSAY OF TROPONIN QUANT: CPT | Performed by: EMERGENCY MEDICINE

## 2025-05-23 PROCEDURE — 36415 COLL VENOUS BLD VENIPUNCTURE: CPT | Performed by: EMERGENCY MEDICINE

## 2025-05-23 PROCEDURE — 76705 ECHO EXAM OF ABDOMEN: CPT | Performed by: INTERNAL MEDICINE

## 2025-05-23 PROCEDURE — 83880 ASSAY OF NATRIURETIC PEPTIDE: CPT | Performed by: EMERGENCY MEDICINE

## 2025-05-23 PROCEDURE — 85025 COMPLETE CBC W/AUTO DIFF WBC: CPT | Performed by: EMERGENCY MEDICINE

## 2025-05-23 PROCEDURE — 2500000004 HC RX 250 GENERAL PHARMACY W/ HCPCS (ALT 636 FOR OP/ED): Mod: JZ | Performed by: PHYSICIAN ASSISTANT

## 2025-05-23 PROCEDURE — 93005 ELECTROCARDIOGRAM TRACING: CPT

## 2025-05-23 PROCEDURE — 96374 THER/PROPH/DIAG INJ IV PUSH: CPT | Mod: 59

## 2025-05-23 PROCEDURE — 2550000001 HC RX 255 CONTRASTS: Performed by: EMERGENCY MEDICINE

## 2025-05-23 PROCEDURE — 76705 ECHO EXAM OF ABDOMEN: CPT

## 2025-05-23 PROCEDURE — 80053 COMPREHEN METABOLIC PANEL: CPT | Performed by: EMERGENCY MEDICINE

## 2025-05-23 PROCEDURE — 71275 CT ANGIOGRAPHY CHEST: CPT

## 2025-05-23 PROCEDURE — 83605 ASSAY OF LACTIC ACID: CPT | Performed by: EMERGENCY MEDICINE

## 2025-05-23 PROCEDURE — 96375 TX/PRO/DX INJ NEW DRUG ADDON: CPT | Mod: 59

## 2025-05-23 PROCEDURE — 99285 EMERGENCY DEPT VISIT HI MDM: CPT | Mod: 25 | Performed by: EMERGENCY MEDICINE

## 2025-05-23 PROCEDURE — 96361 HYDRATE IV INFUSION ADD-ON: CPT

## 2025-05-23 PROCEDURE — 74177 CT ABD & PELVIS W/CONTRAST: CPT

## 2025-05-23 RX ORDER — ALBUTEROL SULFATE 90 UG/1
2 INHALANT RESPIRATORY (INHALATION) EVERY 4 HOURS PRN
Qty: 18 G | Refills: 0 | Status: SHIPPED | OUTPATIENT
Start: 2025-05-23 | End: 2025-06-22

## 2025-05-23 RX ORDER — AMOXICILLIN AND CLAVULANATE POTASSIUM 875; 125 MG/1; MG/1
1 TABLET, FILM COATED ORAL EVERY 12 HOURS
Qty: 14 TABLET | Refills: 0 | Status: SHIPPED | OUTPATIENT
Start: 2025-05-23 | End: 2025-05-30

## 2025-05-23 RX ORDER — AZITHROMYCIN 500 MG/1
500 TABLET, FILM COATED ORAL ONCE
Status: DISCONTINUED | OUTPATIENT
Start: 2025-05-23 | End: 2025-05-23 | Stop reason: HOSPADM

## 2025-05-23 RX ORDER — MORPHINE SULFATE 4 MG/ML
4 INJECTION, SOLUTION INTRAMUSCULAR; INTRAVENOUS ONCE
Status: COMPLETED | OUTPATIENT
Start: 2025-05-23 | End: 2025-05-23

## 2025-05-23 RX ORDER — CEFTRIAXONE 2 G/50ML
2 INJECTION, SOLUTION INTRAVENOUS ONCE
Status: DISCONTINUED | OUTPATIENT
Start: 2025-05-23 | End: 2025-05-23 | Stop reason: HOSPADM

## 2025-05-23 RX ORDER — ONDANSETRON HYDROCHLORIDE 2 MG/ML
4 INJECTION, SOLUTION INTRAVENOUS ONCE
Status: COMPLETED | OUTPATIENT
Start: 2025-05-23 | End: 2025-05-23

## 2025-05-23 RX ORDER — FAMOTIDINE 10 MG/ML
20 INJECTION, SOLUTION INTRAVENOUS ONCE
Status: COMPLETED | OUTPATIENT
Start: 2025-05-23 | End: 2025-05-23

## 2025-05-23 RX ORDER — AZITHROMYCIN 250 MG/1
250 TABLET, FILM COATED ORAL DAILY
Qty: 4 TABLET | Refills: 0 | Status: SHIPPED | OUTPATIENT
Start: 2025-05-23 | End: 2025-05-27

## 2025-05-23 RX ORDER — NAPROXEN 500 MG/1
500 TABLET ORAL
Qty: 14 TABLET | Refills: 0 | Status: SHIPPED | OUTPATIENT
Start: 2025-05-23 | End: 2025-05-30

## 2025-05-23 RX ADMIN — MORPHINE SULFATE 4 MG: 4 INJECTION, SOLUTION INTRAMUSCULAR; INTRAVENOUS at 08:56

## 2025-05-23 RX ADMIN — FAMOTIDINE 20 MG: 10 INJECTION, SOLUTION INTRAVENOUS at 08:56

## 2025-05-23 RX ADMIN — IOHEXOL 75 ML: 350 INJECTION, SOLUTION INTRAVENOUS at 09:51

## 2025-05-23 RX ADMIN — SODIUM CHLORIDE 500 ML: 900 INJECTION, SOLUTION INTRAVENOUS at 08:53

## 2025-05-23 RX ADMIN — ONDANSETRON 4 MG: 2 INJECTION, SOLUTION INTRAMUSCULAR; INTRAVENOUS at 08:55

## 2025-05-23 ASSESSMENT — PAIN SCALES - GENERAL: PAINLEVEL_OUTOF10: 3

## 2025-05-23 ASSESSMENT — PAIN - FUNCTIONAL ASSESSMENT: PAIN_FUNCTIONAL_ASSESSMENT: 0-10

## 2025-05-23 ASSESSMENT — PAIN DESCRIPTION - PROGRESSION: CLINICAL_PROGRESSION: GRADUALLY IMPROVING

## 2025-05-23 NOTE — ED PROVIDER NOTES
HPI   Chief Complaint   Patient presents with    Chest Pain     Chest pain with sob that started this morning. States the pain is worse with breathing, had a colonoscopy on Tuesday.        64-year-old male presented emergency department with a chief complaint of 1 day pain in his right lower chest.  Pain is versus an abrasion.  Describes as sharp.  Had colonoscopy several days ago.  Oxygen dependent with history of COPD on baseline oxygen.  Denies history of PE DVT.  Well-appearing nontoxic afebrile.  Denies specific injury or trauma.  No other complaint.              Patient History   Medical History[1]  Surgical History[2]  Family History[3]  Social History[4]    Physical Exam   ED Triage Vitals [05/23/25 0840]   Temperature Heart Rate Respirations BP   37 °C (98.6 °F) (!) 112 18 (!) 107/93      Pulse Ox Temp Source Heart Rate Source Patient Position   (!) 93 % Oral Monitor Lying      BP Location FiO2 (%)     Right arm --       Physical Exam  Vitals and nursing note reviewed.   Constitutional:       Appearance: He is well-developed.   HENT:      Head: Normocephalic.   Cardiovascular:      Rate and Rhythm: Normal rate and regular rhythm.      Heart sounds: Normal heart sounds.   Pulmonary:      Breath sounds: Normal breath sounds.   Abdominal:      General: Bowel sounds are normal.      Palpations: Abdomen is soft.   Musculoskeletal:      Cervical back: Normal range of motion.      Right lower leg: No edema.      Left lower leg: No edema.   Skin:     General: Skin is warm.   Neurological:      General: No focal deficit present.      Mental Status: He is alert.   Psychiatric:         Mood and Affect: Mood normal.           ED Course & MDM   Diagnoses as of 05/23/25 1134   Chest pain, unspecified type   Right upper quadrant abdominal pain   Dyspnea, unspecified type   Diastolic hypertension   Pneumonia due to infectious organism, unspecified laterality, unspecified part of lung                 No data recorded      Kiersten Coma Scale Score: 15 (05/23/25 0841 : Dia Breen RN)                           Medical Decision Making  I have seen and evaluated this patient.  The attending physician has also seen and evaluated this patient.  Vital signs, laboratory testing and diagnostic images if applicable have been reviewed.  All laboratory and imaging is interpreted by myself unless otherwise stated.  Radiology studies are also formally interpreted by radiologist.     CBC with 12,000 white count, no significant anemia metabolic panel without ricki renal impairment electrolyte abnormality, lactic acid within normal limits.  Blood culture sent.  Patient with evidence of right middle lobe pneumonia.  Low curb 65.  Patient on baseline oxygen without increased requirement.  Given dose of antibiotic.  Discharged on antibiotic with return precaution.  Released in stable condition.          Labs Reviewed   CBC WITH AUTO DIFFERENTIAL - Abnormal       Result Value    WBC 12.2 (*)     nRBC 0.0      RBC 5.15      Hemoglobin 14.0      Hematocrit 45.5      MCV 88      MCH 27.2      MCHC 30.8 (*)     RDW 16.4 (*)     Platelets 202      Neutrophils % 66.1      Immature Granulocytes %, Automated 0.3      Lymphocytes % 20.6      Monocytes % 12.4      Eosinophils % 0.3      Basophils % 0.3      Neutrophils Absolute 8.07 (*)     Immature Granulocytes Absolute, Automated 0.04      Lymphocytes Absolute 2.52      Monocytes Absolute 1.52 (*)     Eosinophils Absolute 0.04      Basophils Absolute 0.04     COMPREHENSIVE METABOLIC PANEL - Abnormal    Glucose 116 (*)     Sodium 134 (*)     Potassium 4.1      Chloride 101      Bicarbonate 28      Anion Gap 9 (*)     Urea Nitrogen 11      Creatinine 0.97      eGFR 88      Calcium 9.0      Albumin 3.5      Alkaline Phosphatase 56      Total Protein 7.1      AST 14      Bilirubin, Total 1.3 (*)     ALT 11     B-TYPE NATRIURETIC PEPTIDE - Normal    BNP 38      Narrative:        <100 pg/mL - Heart failure  unlikely  100-299 pg/mL - Intermediate probability of acute heart                  failure exacerbation. Correlate with clinical                  context and patient history.    >=300 pg/mL - Heart Failure likely. Correlate with clinical                  context and patient history.    BNP testing is performed using different testing methodology at [facility] than at other [facility]. Direct result comparisons should only be made within the same method.      SERIAL TROPONIN-INITIAL - Normal    Troponin I, High Sensitivity 18      Narrative:     Less than 99th percentile of normal range cutoff-  Female and children under 18 years old <14 ng/L; Male <21 ng/L: Negative  Repeat testing should be performed if clinically indicated.     Female and children under 18 years old 14-50 ng/L; Male 21-50 ng/L:  Consistent with possible cardiac damage and possible increased clinical   risk. Serial measurements may help to assess extent of myocardial damage.     >50 ng/L: Consistent with cardiac damage, increased clinical risk and  myocardial infarction. Serial measurements may help assess extent of   myocardial damage.      NOTE: Children less than 1 year old may have higher baseline troponin   levels and results should be interpreted in conjunction with the overall   clinical context.     NOTE: Troponin I testing is performed using a different   testing methodology at [facility] than at other   [facility]. Direct result comparisons should only   be made within the same method.   SERIAL TROPONIN, 1 HOUR - Normal    Troponin I, High Sensitivity 14      Narrative:     Less than 99th percentile of normal range cutoff-  Female and children under 18 years old <14 ng/L; Male <21 ng/L: Negative  Repeat testing should be performed if clinically indicated.     Female and children under 18 years old 14-50 ng/L; Male 21-50 ng/L:  Consistent with possible cardiac damage and possible increased clinical    risk. Serial measurements may help to assess extent of myocardial damage.     >50 ng/L: Consistent with cardiac damage, increased clinical risk and  myocardial infarction. Serial measurements may help assess extent of   myocardial damage.      NOTE: Children less than 1 year old may have higher baseline troponin   levels and results should be interpreted in conjunction with the overall   clinical context.     NOTE: Troponin I testing is performed using a different   testing methodology at [facility] than at other   [facility]. Direct result comparisons should only   be made within the same method.   LACTATE - Normal    Lactate 0.6      Narrative:     Venipuncture immediately after or during the administration of Metamizole may lead to falsely low results. Testing should be performed immediately prior to Metamizole dosing.   BLOOD CULTURE   BLOOD CULTURE   TROPONIN SERIES- (INITIAL, 1 HR)    Narrative:     The following orders were created for panel order Troponin I Series, High Sensitivity (0, 1 HR).  Procedure                               Abnormality         Status                     ---------                               -----------         ------                     Troponin I, High Sensiti...[[id number]]  Normal              Final result               Troponin, High Sensitivi...[[id number]]  Normal              Final result                 Please view results for these tests on the individual orders.   URINALYSIS WITH REFLEX CULTURE AND MICROSCOPIC    Narrative:     The following orders were created for panel order Urinalysis with Reflex Culture and Microscopic.  Procedure                               Abnormality         Status                     ---------                               -----------         ------                     Urinalysis with Reflex C...[[id number]]                                                 Extra Urine Gray Tube[[id number]]                                                          Please view results for these tests on the individual orders.   URINALYSIS WITH REFLEX CULTURE AND MICROSCOPIC   EXTRA URINE GRAY TUBE     US right upper quadrant   Final Result   1. No sonographic evidence of acute cholecystitis. A 3 mm echogenic   focus along the gallbladder wall could be a small polyp or adherent   calculus/biliary sludge.   2. Unchanged mild prominence of the common bile duct.   3. Hepatomegaly with mild parenchymal heterogeneity, correlation with   liver function tests is recommended.   4. The previously noted mild pancreatic duct dilatation and uncinate   process cystic lesion is not well visualized due to overlying bowel   gas and is better evaluated on prior CT and prior MRCP.        MACRO:   None        Signed by: Selin Evans 5/23/2025 11:14 AM   Dictation workstation:   [id number]      CT angio chest for pulmonary embolism   Final Result        Right middle lobe pneumonia with additional new air-fluid level   within one of the bullae likely related to infection. Consider   follow-up to resolution in 8-12 weeks or sooner as warranted.        No definite acute pulmonary embolism within limits of incomplete   assessment of the left lower lobe segmental and subsegmental   pulmonary arteries due to motion.        Mild diffuse rectal wall thickening which may be seen with   underdistention or proctitis.        Chronic abdominopelvic process described.        MACRO   None        Signed by: Manju Villeda 5/23/2025 10:51 AM   Dictation workstation:   [id number]      CT abdomen pelvis w IV contrast   Final Result        Right middle lobe pneumonia with additional new air-fluid level   within one of the bullae likely related to infection. Consider   follow-up to resolution in 8-12 weeks or sooner as warranted.        No definite acute pulmonary embolism within limits of incomplete   assessment of the left lower lobe segmental and subsegmental   pulmonary arteries due to  motion.        Mild diffuse rectal wall thickening which may be seen with   underdistention or proctitis.        Chronic abdominopelvic process described.        MACRO   None        Signed by: Manju Villeda 5/23/2025 10:51 AM   Dictation workstation:   [id number]        Medications   cefTRIAXone (Rocephin) 2 g in dextrose (iso) IV 50 mL (has no administration in time range)   azithromycin (Zithromax) tablet 500 mg (has no administration in time range)   morphine injection 4 mg (4 mg intravenous Given 5/23/25 0856)   famotidine PF (Pepcid) injection 20 mg (20 mg intravenous Given 5/23/25 0856)   sodium chloride 0.9 % bolus 500 mL (0 mL intravenous Stopped 5/23/25 1012)   ondansetron (Zofran) injection 4 mg (4 mg intravenous Given 5/23/25 0855)   iohexol (OMNIPaque) 350 mg iodine/mL solution 75 mL (75 mL intravenous Given 5/23/25 0951)     New Prescriptions    ALBUTEROL 90 MCG/ACTUATION INHALER    Inhale 2 puffs every 4 hours if needed for wheezing.    AMOXICILLIN-CLAVULANATE (AUGMENTIN) 875-125 MG TABLET    Take 1 tablet by mouth every 12 hours for 7 days.    AZITHROMYCIN (ZITHROMAX Z-DANIEL) 250 MG TABLET    Take 1 tablet (250 mg) by mouth once daily for 4 days.    NAPROXEN (NAPROSYN) 500 MG TABLET    Take 1 tablet (500 mg) by mouth 2 times daily (morning and late afternoon) for 7 days.         Procedure  Procedures       [1]   Past Medical History:  Diagnosis Date    CHF (congestive heart failure)     COPD (chronic obstructive pulmonary disease) (Multi)     Crohn's disease (Multi)     Gastro-esophageal reflux disease without esophagitis     HTN (hypertension)     On home oxygen therapy     Opioid use, unspecified, uncomplicated     Heroin use, on methadone therapy    CHARLENE (obstructive sleep apnea)     Pancreatic cyst ([facility]-HCC)     Personal history of transient ischemic attack (TIA), and cerebral infarction without residual deficits     History of stroke   [2]   Past Surgical History:  Procedure Laterality Date     COLONOSCOPY      ESOPHAGOGASTRODUODENOSCOPY      KNEE ARTHROSCOPY W/ MENISCAL REPAIR     [3]   Family History  Problem Relation Name Age of Onset    Hypertension Mother      Heart disease Mother      Heart disease Brother      Hypertension Sibling     [4]   Social History  Tobacco Use    Smoking status: Former     Current packs/day: 0.00     Average packs/day: 1 pack/day for 20.0 years (20.0 ttl pk-yrs)     Types: Cigarettes     Start date:      Quit date:      Years since quittin.4     Passive exposure: Never    Smokeless tobacco: Never   Vaping Use    Vaping status: Never Used   Substance Use Topics    Alcohol use: Yes     Alcohol/week: 1.0 standard drink of alcohol     Types: 1 Cans of beer per week     Comment: last drink (beer) 3 days ago    Drug use: Not Currently     Types: Heroin     Comment: went to methadone clinic yesterday        Yfn Boateng PA-C  [phone]

## 2025-05-23 NOTE — Clinical Note
Arnaldo Francisco was seen and treated in our emergency department on 5/23/2025.  He may return to work on 05/27/2025.       If you have any questions or concerns, please don't hesitate to call.      Yfn Boateng PA-C

## 2025-05-23 NOTE — PROGRESS NOTES
Attestation/Supervisory note for SURINDER Boateng      The patient is a 64-year-old male presenting to the emergency department for evaluation of chest pain and abdominal pain.  He states that the pain is on the right side.  No specific better or worse.  It is a sharp pain.  No recent injury or trauma.  No fever or chills.  No cough or congestion.  He does supplemental oxygen, 2 to 3 L, by nasal cannula, at night for his COPD.  He denies any history of CAD or ACS.  No history of PE or DVT.  He does have a history of hypertension and hyperlipidemia.  He did have a colonoscopy last week.  He denies any recent travel or immobility.  No sick contacts.  No headache or visual changes.  No palpitations.  No diaphoresis.  No nausea, vomiting or diarrhea.  No urinary complaints.  All pertinent positives and negatives are recorded above.  All other systems reviewed and otherwise negative.  Vital signs with diastolic hypertension, tachycardia and borderline hypoxia but otherwise within normal limits.  Physical exam with a well-nourished well-developed male who appears to be in mild distress.  HEENT exam within normal limits.  He has no evidence of airway compromise or respiratory distress other than some conversational dyspnea.  Abdominal exam with tenderness to palpation of the right upper quadrant with a positive Euceda's.  No palpable masses.  No flank pain with percussion or palpation.  He does not have any gross motor, neurologic or vascular deficits on exam.  Pulses are equal bilaterally.      EKG with sinus tachycardia 109 bpm, occasional PVCs, leftward axis, LVH criteria, normal ST segment, and inverted T wave in lead V6      IV Pepcid, IV morphine, IV Zofran and IV fluids ordered.      Diagnostic labs with mild leukocytosis, mild electrolyte imbalance, but otherwise unremarkable.  The patient did not provide a urine specimen for urinalysis.      Initial troponin 18.  Repeat troponin 14      Heart score 3      US right upper  quadrant   Final Result   1. No sonographic evidence of acute cholecystitis. A 3 mm echogenic   focus along the gallbladder wall could be a small polyp or adherent   calculus/biliary sludge.   2. Unchanged mild prominence of the common bile duct.   3. Hepatomegaly with mild parenchymal heterogeneity, correlation with   liver function tests is recommended.   4. The previously noted mild pancreatic duct dilatation and uncinate   process cystic lesion is not well visualized due to overlying bowel   gas and is better evaluated on prior CT and prior MRCP.        MACRO:   None        Signed by: Selin Evans 5/23/2025 11:14 AM   Dictation workstation:   [id number]      CT angio chest for pulmonary embolism   Final Result        Right middle lobe pneumonia with additional new air-fluid level   within one of the bullae likely related to infection. Consider   follow-up to resolution in 8-12 weeks or sooner as warranted.        No definite acute pulmonary embolism within limits of incomplete   assessment of the left lower lobe segmental and subsegmental   pulmonary arteries due to motion.        Mild diffuse rectal wall thickening which may be seen with   underdistention or proctitis.        Chronic abdominopelvic process described.        MACRO   None        Signed by: Manju Villeda 5/23/2025 10:51 AM   Dictation workstation:   [id number]      CT abdomen pelvis w IV contrast   Final Result        Right middle lobe pneumonia with additional new air-fluid level   within one of the bullae likely related to infection. Consider   follow-up to resolution in 8-12 weeks or sooner as warranted.        No definite acute pulmonary embolism within limits of incomplete   assessment of the left lower lobe segmental and subsegmental   pulmonary arteries due to motion.        Mild diffuse rectal wall thickening which may be seen with   underdistention or proctitis.        Chronic abdominopelvic process described.        MACRO   None         Signed by: Manju Villeda 5/23/2025 10:51 AM   Dictation workstation:   [id number]           The patient does not have any evidence of hemodynamic instability.  He is well-perfused on exam.  No evidence of sepsis on reperfusion exam performed at 1130.  No evidence of a STEMI on EKG or cardiac enzymes.  Diagnostic labs with leukocytosis, mild electrolyte imbalance but otherwise unremarkable.  The right upper quadrant ultrasound shows no evidence of acute cholecystitis.  There is a 3 mm echogenic focus along the gallbladder wall that could be a small polyp or possible adherent calculus or biliary sludge.  No evidence of pancreatitis.  CT chest abdomen pelvis shows no evidence of a dissection or PE.  There is evidence of a right middle lobe pneumonia.  No evidence of CHF.  No widening of the mediastinum.  No acute process within the abdomen or pelvis.  The radiologist does report that there is some mild diffuse rectal wall thickening which may be seen with underdistention or proctitis but the patient does not have any signs or symptoms that correlate to that finding.  The patient was feeling much better in the emergency room after medications were given.      He was released in good condition.  He was instructed to follow-up with his primary care physician within 1 to 2 days for further management of his current symptoms.  He will return to the emergency department sooner with worsening of symptoms or onset of new symptoms.  He was given a prescription for an albuterol inhaler, Augmentin, azithromycin and naproxen.  He will return to the emergency department sooner with worsening of symptoms or onset of new symptoms      Impression/diagnosis:  Chest pain, right-sided  Right upper quadrant abdominal pain  Diastolic hypertension  Dyspnea  Right middle lobe pneumonia      Critical care time billing is not warranted at this time      I personally saw the patient and made/approve the management plan and take  responsibility for the patient management.      I independently interpreted the following study (S) EKG and diagnostic labs      I personally discussed the patient's management with the patient      I reviewed the results of the diagnostic labs and diagnostic imaging.  Formal radiology read was completed by the radiologist.      Rox Martinez MD

## 2025-05-25 LAB
ATRIAL RATE: 109 BPM
P AXIS: 78 DEGREES
P OFFSET: 193 MS
P ONSET: 132 MS
PR INTERVAL: 166 MS
Q ONSET: 215 MS
QRS COUNT: 18 BEATS
QRS DURATION: 120 MS
QT INTERVAL: 346 MS
QTC CALCULATION(BAZETT): 465 MS
QTC FREDERICIA: 422 MS
R AXIS: -86 DEGREES
T AXIS: 70 DEGREES
T OFFSET: 388 MS
VENTRICULAR RATE: 109 BPM

## 2025-05-27 DIAGNOSIS — J44.89 ASTHMA-COPD OVERLAP SYNDROME (MULTI): ICD-10-CM

## 2025-05-28 RX ORDER — FLUTICASONE PROPIONATE AND SALMETEROL 50; 500 UG/1; UG/1
1 POWDER RESPIRATORY (INHALATION) 2 TIMES DAILY
Qty: 60 EACH | Refills: 0 | Status: SHIPPED | OUTPATIENT
Start: 2025-05-28

## 2025-05-30 LAB
LABORATORY COMMENT REPORT: NORMAL
PATH REPORT.FINAL DX SPEC: NORMAL
PATH REPORT.GROSS SPEC: NORMAL
PATH REPORT.MICROSCOPIC SPEC OTHER STN: NORMAL
PATH REPORT.RELEVANT HX SPEC: NORMAL
PATH REPORT.TOTAL CANCER: NORMAL

## 2025-05-30 PROCEDURE — RXMED WILLOW AMBULATORY MEDICATION CHARGE

## 2025-06-02 ENCOUNTER — PHARMACY VISIT (OUTPATIENT)
Dept: PHARMACY | Facility: CLINIC | Age: 65
End: 2025-06-02
Payer: MEDICAID

## 2025-06-03 ENCOUNTER — SPECIALTY PHARMACY (OUTPATIENT)
Dept: PHARMACY | Facility: CLINIC | Age: 65
End: 2025-06-03

## 2025-06-03 NOTE — PROGRESS NOTES
[facility] Clinical Note  Initial Patient Education     Introduction  Arnaldo Francisco is a 64 y.o. male who is on the specialty pharmacy service for management of: Gastroenterology Core.    Arnaldo Francisco is initiating the following therapy: Tremfya 400 mg (as 2 x 200 mg injections) under the skin every 28 days on weeks 0, 4, and 8         Medication receipt date: 6/3/2025    Duration of therapy: 12 weeks    The most recent encounter visit with the referring prescriber Dr. Zelaya on 5/20/2025 was reviewed.  Pharmacy will continue to collaborate in the care of this patient with the referring prescriber.    Clinical Background  An initial assessment was conducted prior to first fill of the medication to determine the appropriateness of therapy given the patient's diagnosis, medication list, comorbidities, allergies, medical history, patient's ability to self administer medication, and therapeutic goals based on possible outcomes of therapy. Refer to initial assessment task completed on 5/30/2025.    Labs for clinical appropriateness that were reviewed include:   Gastroenterology - CBC-diff:   Lab Results   Component Value Date    WBC 12.2 (H) 05/23/2025    RBC 5.15 05/23/2025    HGB 14.0 05/23/2025    HCT 45.5 05/23/2025    MCV 88 05/23/2025    MCHC 30.8 (L) 05/23/2025     05/23/2025    RDW 16.4 (H) 05/23/2025    NEUTOPHILPCT 66.1 05/23/2025    IGPCT 0.3 05/23/2025    LYMPHOPCT 20.6 05/23/2025    MONOPCT 12.4 05/23/2025    EOSPCT 0.3 05/23/2025    BASOPCT 0.3 05/23/2025    NEUTROABS 8.07 (H) 05/23/2025    LYMPHSABS 2.52 05/23/2025    MONOSABS 1.52 (H) 05/23/2025    EOSABS 0.04 05/23/2025    BASOSABS 0.04 05/23/2025   , CMP:   Lab Results   Component Value Date    GLUCOSE 116 (H) 05/23/2025     (L) 05/23/2025    K 4.1 05/23/2025     05/23/2025    CO2 28 05/23/2025    ANIONGAP 9 (L) 05/23/2025    BUN 11 05/23/2025    CREATININE 0.97 05/23/2025    CALCIUM 9.0  05/23/2025    ALBUMIN 3.5 05/23/2025    ALKPHOS 56 05/23/2025    PROT 7.1 05/23/2025    AST 14 05/23/2025    BILITOT 1.3 (H) 05/23/2025    ALT 11 05/23/2025   , TB:   Lab Results   Component Value Date    TBSIN Negative 09/10/2024   , and Hepatitis B panel:   Lab Results   Component Value Date    HEPBCAB Nonreactive 09/10/2024    HEPBCIGM Nonreactive 09/10/2024    HEPBSAG Nonreactive 09/10/2024    HEPBSAB <3.1 09/10/2024       Education/Discussion  Arnaldo was contacted on 6/3/2025 at 4:20 PM for a pharmacy visit with encounter number [id number] from:   [facility]  [address] [place] [id number]  Dept: [phone]  Dept Fax: [phone]  Arnaldo consented to a/an Telephone visit, which was performed.    Medication Start Date (planned or actual): Today         Education was conducted prior to start of therapy? Yes    Education discussed includes the following:  Patient Education  Counseled the Patient on the Following : Possible side effects and management, Pharmacy contact information, Theraputic rationale and expected outcomes, Expected duration of therapy, Doses and administration, Lab monitoring and follow-up, Safe handling, storage, and disposal  Learner: Patient  Education Method: Explanation  Education Response: Verbalizes understanding  Additional details of the medication specific counseling are found within the linked patient education flowsheet.     The follow up timeline was discussed. Every person responds to and reacts to therapy differently. Patient should be assessed for efficacy and tolerability in approximately: 3 months    Provided education on goals and possible outcomes of therapy:  Adherence with therapy  Timely completion of appropriate labs  Timely and appropriate follow up with provider  Identify and address medication interactions with presciption medications, OTC medications and supplements  Optimize or maintain quality of life  Gastroenterology:  Improve gastrointestinal clinical symptoms such as abdominal pain, inflammation, diarrhea, constipation, and bleeding, Reduce frequency and urgency of bowel movements, and Allow for GI mucosal healing (observed through endoscopy and colonoscopy)           The importance of adherence was discussed and they were advised to take the medication as prescribed by their provider.         Impression/Plan  Review and Assessment   Reviewed During This Encounter: Medications  Medications Assessed for Appropriate Use, Dose, Route, Frequency, and Duration: Yes  Medication Reconciliation Completed: No (Comment)  Drug Interactions Evaluated: Yes  Clinically Relevant Drug Interactions Identified: No    This patient has not been identified as high risk due to Lack of high risk qualifiers.  The following action was taken: N/A.         The  Specialty Pharmacy Welcome packet may be viewed here:   Specialty Pharmacy Welcome Packet     Or by scanning QR code:      Provided contact information ([phone]) for [facility] and reviewed dispensing process, refill timeline and patient management follow up. Advised to contact the pharmacy if there are any adverse effects and/or changes to medication list, including prescriptions, OTC medications, herbal products, or supplements. Confirmed understanding of education conducted during assessment. All questions and concerns were addressed and patient was encouraged to reach out for additional questions or concerns.      Minerva Ellsworth, PharmD

## 2025-06-26 ENCOUNTER — SPECIALTY PHARMACY (OUTPATIENT)
Dept: PHARMACY | Facility: CLINIC | Age: 65
End: 2025-06-26

## 2025-06-26 PROCEDURE — RXMED WILLOW AMBULATORY MEDICATION CHARGE

## 2025-06-27 ENCOUNTER — TELEPHONE (OUTPATIENT)
Dept: GASTROENTEROLOGY | Facility: HOSPITAL | Age: 65
End: 2025-06-27
Payer: MEDICAID

## 2025-06-27 DIAGNOSIS — D50.0 IRON DEFICIENCY ANEMIA DUE TO CHRONIC BLOOD LOSS: Primary | ICD-10-CM

## 2025-06-27 RX ORDER — FERROUS SULFATE 325(65) MG
325 TABLET, DELAYED RELEASE (ENTERIC COATED) ORAL
Qty: 90 TABLET | Refills: 1 | Status: SHIPPED | OUTPATIENT
Start: 2025-06-27 | End: 2025-12-24

## 2025-06-30 ENCOUNTER — PHARMACY VISIT (OUTPATIENT)
Dept: PHARMACY | Facility: CLINIC | Age: 65
End: 2025-06-30
Payer: MEDICAID

## 2025-07-02 DIAGNOSIS — J44.89 ASTHMA-COPD OVERLAP SYNDROME (MULTI): ICD-10-CM

## 2025-07-02 RX ORDER — FLUTICASONE PROPIONATE AND SALMETEROL 500; 50 UG/1; UG/1
1 POWDER RESPIRATORY (INHALATION) 2 TIMES DAILY
Qty: 60 EACH | Refills: 0 | Status: SHIPPED | OUTPATIENT
Start: 2025-07-02

## 2025-07-07 ENCOUNTER — APPOINTMENT (OUTPATIENT)
Dept: INFUSION THERAPY | Facility: CLINIC | Age: 65
End: 2025-07-07
Payer: MEDICAID

## 2025-07-24 ENCOUNTER — APPOINTMENT (OUTPATIENT)
Dept: PRIMARY CARE | Facility: CLINIC | Age: 65
End: 2025-07-24
Payer: MEDICAID

## 2025-08-03 DIAGNOSIS — I50.22 CHRONIC SYSTOLIC CONGESTIVE HEART FAILURE: ICD-10-CM

## 2025-08-03 DIAGNOSIS — I25.118 CORONARY ARTERY DISEASE INVOLVING NATIVE HEART WITH OTHER FORM OF ANGINA PECTORIS, UNSPECIFIED VESSEL OR LESION TYPE: ICD-10-CM

## 2025-08-04 ENCOUNTER — TELEPHONE (OUTPATIENT)
Dept: GASTROENTEROLOGY | Facility: HOSPITAL | Age: 65
End: 2025-08-04
Payer: MEDICAID

## 2025-08-04 DIAGNOSIS — K52.9 COLITIS: Primary | ICD-10-CM

## 2025-08-04 PROCEDURE — RXMED WILLOW AMBULATORY MEDICATION CHARGE

## 2025-08-04 RX ORDER — LOPERAMIDE HYDROCHLORIDE 2 MG/1
2 CAPSULE ORAL 3 TIMES DAILY PRN
Qty: 30 CAPSULE | Refills: 1 | Status: SHIPPED | OUTPATIENT
Start: 2025-08-04 | End: 2025-09-03

## 2025-08-04 RX ORDER — ASPIRIN 81 MG/1
81 TABLET ORAL DAILY
Qty: 30 TABLET | Refills: 1 | Status: SHIPPED | OUTPATIENT
Start: 2025-08-04

## 2025-08-06 ENCOUNTER — APPOINTMENT (OUTPATIENT)
Facility: CLINIC | Age: 65
End: 2025-08-06
Payer: MEDICAID

## 2025-08-06 ENCOUNTER — SPECIALTY PHARMACY (OUTPATIENT)
Dept: PHARMACY | Facility: CLINIC | Age: 65
End: 2025-08-06

## 2025-08-07 ENCOUNTER — PHARMACY VISIT (OUTPATIENT)
Dept: PHARMACY | Facility: CLINIC | Age: 65
End: 2025-08-07
Payer: MEDICAID

## 2025-08-16 ENCOUNTER — SPECIALTY PHARMACY (OUTPATIENT)
Dept: PHARMACY | Facility: CLINIC | Age: 65
End: 2025-08-16

## 2025-08-18 ENCOUNTER — TELEPHONE (OUTPATIENT)
Dept: PRIMARY CARE | Facility: CLINIC | Age: 65
End: 2025-08-18
Payer: MEDICAID

## 2025-08-20 ENCOUNTER — APPOINTMENT (OUTPATIENT)
Dept: DERMATOLOGY | Facility: CLINIC | Age: 65
End: 2025-08-20
Payer: MEDICAID

## 2025-08-21 ENCOUNTER — OFFICE VISIT (OUTPATIENT)
Dept: PRIMARY CARE | Facility: CLINIC | Age: 65
End: 2025-08-21
Payer: MEDICAID

## 2025-08-21 VITALS
WEIGHT: 197.5 LBS | OXYGEN SATURATION: 92 % | HEART RATE: 64 BPM | DIASTOLIC BLOOD PRESSURE: 96 MMHG | SYSTOLIC BLOOD PRESSURE: 146 MMHG | BODY MASS INDEX: 27.55 KG/M2 | TEMPERATURE: 97.1 F

## 2025-08-21 DIAGNOSIS — J96.11 CHRONIC HYPOXIC RESPIRATORY FAILURE: ICD-10-CM

## 2025-08-21 DIAGNOSIS — I50.22 CHRONIC SYSTOLIC CONGESTIVE HEART FAILURE: ICD-10-CM

## 2025-08-21 DIAGNOSIS — Z12.5 SCREENING PSA (PROSTATE SPECIFIC ANTIGEN): Primary | ICD-10-CM

## 2025-08-21 DIAGNOSIS — R31.21 ASYMPTOMATIC MICROSCOPIC HEMATURIA: ICD-10-CM

## 2025-08-21 DIAGNOSIS — R74.8 ABNORMAL LIVER ENZYMES: ICD-10-CM

## 2025-08-21 DIAGNOSIS — K52.9 COLITIS: ICD-10-CM

## 2025-08-21 DIAGNOSIS — J43.9 PULMONARY EMPHYSEMA, UNSPECIFIED EMPHYSEMA TYPE (MULTI): ICD-10-CM

## 2025-08-21 DIAGNOSIS — R53.83 FATIGUE, UNSPECIFIED TYPE: ICD-10-CM

## 2025-08-21 DIAGNOSIS — E11.9 TYPE 2 DIABETES MELLITUS WITHOUT COMPLICATION, WITHOUT LONG-TERM CURRENT USE OF INSULIN: ICD-10-CM

## 2025-08-21 DIAGNOSIS — K50.90 CROHN'S DISEASE WITHOUT COMPLICATION, UNSPECIFIED GASTROINTESTINAL TRACT LOCATION: ICD-10-CM

## 2025-08-21 LAB
ALBUMIN SERPL BCP-MCNC: 3.3 G/DL (ref 3.4–5)
ALP SERPL-CCNC: 75 U/L (ref 45–117)
ALT SERPL W P-5'-P-CCNC: 56 U/L (ref 14–59)
APPEARANCE UR: CLEAR
AST SERPL W P-5'-P-CCNC: 34 U/L (ref 15–37)
BILIRUB DIRECT SERPL-MCNC: 0.5 MG/DL (ref 0–0.2)
BILIRUB SERPL-MCNC: 1.9 MG/DL (ref 0.2–1)
BILIRUB UR QL STRIP: NEGATIVE
COLOR UR: YELLOW
GLUCOSE UR STRIP-MCNC: NEGATIVE MG/DL
HGB UR QL STRIP: NEGATIVE
KETONES UR STRIP-MCNC: NEGATIVE MG/DL
LEUKOCYTE ESTERASE UR QL STRIP: NEGATIVE
NITRITE UR QL STRIP: NEGATIVE
PH UR STRIP: 5.5 [PH]
PROT SERPL-MCNC: 6.9 G/DL (ref 6.4–8.2)
PROT UR STRIP-MCNC: NEGATIVE MG/DL
PSA SERPL-MCNC: 1.32 NG/ML
SP GR UR STRIP.AUTO: 1.02
TSH SERPL-ACNC: 2.12 MIU/L (ref 0.44–3.98)
UROBILINOGEN UR STRIP-ACNC: 0.2 E.U./DL

## 2025-08-21 PROCEDURE — 80076 HEPATIC FUNCTION PANEL: CPT | Performed by: INTERNAL MEDICINE

## 2025-08-21 PROCEDURE — 3080F DIAST BP >= 90 MM HG: CPT | Performed by: INTERNAL MEDICINE

## 2025-08-21 PROCEDURE — 84443 ASSAY THYROID STIM HORMONE: CPT | Performed by: INTERNAL MEDICINE

## 2025-08-21 PROCEDURE — 99214 OFFICE O/P EST MOD 30 MIN: CPT | Performed by: INTERNAL MEDICINE

## 2025-08-21 PROCEDURE — 3077F SYST BP >= 140 MM HG: CPT | Performed by: INTERNAL MEDICINE

## 2025-08-21 PROCEDURE — 81003 URINALYSIS AUTO W/O SCOPE: CPT | Performed by: INTERNAL MEDICINE

## 2025-08-21 PROCEDURE — 84153 ASSAY OF PSA TOTAL: CPT | Performed by: INTERNAL MEDICINE

## 2025-08-21 RX ORDER — DAPAGLIFLOZIN 10 MG/1
10 TABLET, FILM COATED ORAL DAILY
Qty: 90 TABLET | Refills: 3 | Status: CANCELLED | OUTPATIENT
Start: 2025-08-21

## 2025-08-21 ASSESSMENT — ENCOUNTER SYMPTOMS
DEPRESSION: 0
LOSS OF SENSATION IN FEET: 0
OCCASIONAL FEELINGS OF UNSTEADINESS: 0

## 2025-08-22 ENCOUNTER — DOCUMENTATION (OUTPATIENT)
Dept: CARDIOLOGY | Facility: HOSPITAL | Age: 65
End: 2025-08-22
Payer: MEDICAID

## 2025-08-28 DIAGNOSIS — J43.9 PULMONARY EMPHYSEMA, UNSPECIFIED EMPHYSEMA TYPE (MULTI): ICD-10-CM

## 2025-08-29 ENCOUNTER — HOSPITAL ENCOUNTER (OUTPATIENT)
Dept: RADIOLOGY | Facility: CLINIC | Age: 65
Discharge: HOME | End: 2025-08-29
Payer: MEDICAID

## 2025-08-29 ENCOUNTER — TELEPHONE (OUTPATIENT)
Dept: PULMONOLOGY | Facility: HOSPITAL | Age: 65
End: 2025-08-29

## 2025-08-29 ENCOUNTER — APPOINTMENT (OUTPATIENT)
Dept: ORTHOPEDIC SURGERY | Facility: CLINIC | Age: 65
End: 2025-08-29
Payer: MEDICAID

## 2025-08-29 DIAGNOSIS — G89.29 CHRONIC PAIN OF LEFT KNEE: ICD-10-CM

## 2025-08-29 DIAGNOSIS — J43.9 PULMONARY EMPHYSEMA, UNSPECIFIED EMPHYSEMA TYPE (MULTI): ICD-10-CM

## 2025-08-29 DIAGNOSIS — R21 GROIN RASH: Primary | ICD-10-CM

## 2025-08-29 DIAGNOSIS — M25.562 CHRONIC PAIN OF LEFT KNEE: ICD-10-CM

## 2025-08-29 DIAGNOSIS — M17.12 PRIMARY OSTEOARTHRITIS OF LEFT KNEE: Primary | ICD-10-CM

## 2025-08-29 PROCEDURE — 99204 OFFICE O/P NEW MOD 45 MIN: CPT | Performed by: STUDENT IN AN ORGANIZED HEALTH CARE EDUCATION/TRAINING PROGRAM

## 2025-08-29 PROCEDURE — 3048F LDL-C <100 MG/DL: CPT | Performed by: STUDENT IN AN ORGANIZED HEALTH CARE EDUCATION/TRAINING PROGRAM

## 2025-08-29 PROCEDURE — 73564 X-RAY EXAM KNEE 4 OR MORE: CPT | Mod: LT

## 2025-08-29 PROCEDURE — 20610 DRAIN/INJ JOINT/BURSA W/O US: CPT | Performed by: STUDENT IN AN ORGANIZED HEALTH CARE EDUCATION/TRAINING PROGRAM

## 2025-08-29 RX ORDER — TIOTROPIUM BROMIDE INHALATION SPRAY 3.12 UG/1
2 SPRAY, METERED RESPIRATORY (INHALATION) DAILY
Qty: 4 G | Refills: 3 | Status: SHIPPED | OUTPATIENT
Start: 2025-08-29

## 2025-08-29 RX ORDER — TRIAMCINOLONE ACETONIDE 40 MG/ML
40 INJECTION, SUSPENSION INTRA-ARTICULAR; INTRAMUSCULAR
Status: COMPLETED | OUTPATIENT
Start: 2025-08-29 | End: 2025-08-29

## 2025-08-29 RX ORDER — LIDOCAINE HYDROCHLORIDE 10 MG/ML
4 INJECTION, SOLUTION INFILTRATION; PERINEURAL
Status: COMPLETED | OUTPATIENT
Start: 2025-08-29 | End: 2025-08-29

## 2025-08-29 RX ADMIN — TRIAMCINOLONE ACETONIDE 40 MG: 40 INJECTION, SUSPENSION INTRA-ARTICULAR; INTRAMUSCULAR at 09:48

## 2025-08-29 RX ADMIN — LIDOCAINE HYDROCHLORIDE 4 ML: 10 INJECTION, SOLUTION INFILTRATION; PERINEURAL at 09:48

## 2025-09-03 RX ORDER — TIOTROPIUM BROMIDE INHALATION SPRAY 3.12 UG/1
2 SPRAY, METERED RESPIRATORY (INHALATION) DAILY
Qty: 4 G | Refills: 1 | Status: SHIPPED | OUTPATIENT
Start: 2025-09-03

## 2025-09-04 PROCEDURE — RXMED WILLOW AMBULATORY MEDICATION CHARGE

## 2025-09-05 ENCOUNTER — PHARMACY VISIT (OUTPATIENT)
Dept: PHARMACY | Facility: CLINIC | Age: 65
End: 2025-09-05
Payer: MEDICAID

## 2025-09-05 ENCOUNTER — SPECIALTY PHARMACY (OUTPATIENT)
Dept: PHARMACY | Facility: CLINIC | Age: 65
End: 2025-09-05

## 2025-09-23 ENCOUNTER — APPOINTMENT (OUTPATIENT)
Dept: PRIMARY CARE | Facility: CLINIC | Age: 65
End: 2025-09-23
Payer: MEDICAID